# Patient Record
Sex: MALE | Race: WHITE | NOT HISPANIC OR LATINO
[De-identification: names, ages, dates, MRNs, and addresses within clinical notes are randomized per-mention and may not be internally consistent; named-entity substitution may affect disease eponyms.]

---

## 2018-08-26 ENCOUNTER — TRANSCRIPTION ENCOUNTER (OUTPATIENT)
Age: 63
End: 2018-08-26

## 2019-02-15 ENCOUNTER — NON-APPOINTMENT (OUTPATIENT)
Age: 64
End: 2019-02-15

## 2019-02-15 ENCOUNTER — APPOINTMENT (OUTPATIENT)
Dept: INTERNAL MEDICINE | Facility: CLINIC | Age: 64
End: 2019-02-15
Payer: MEDICAID

## 2019-02-15 VITALS
HEIGHT: 68 IN | OXYGEN SATURATION: 98 % | DIASTOLIC BLOOD PRESSURE: 110 MMHG | WEIGHT: 174 LBS | SYSTOLIC BLOOD PRESSURE: 170 MMHG | TEMPERATURE: 97.8 F | HEART RATE: 80 BPM | RESPIRATION RATE: 16 BRPM | BODY MASS INDEX: 26.37 KG/M2

## 2019-02-15 DIAGNOSIS — Z78.9 OTHER SPECIFIED HEALTH STATUS: ICD-10-CM

## 2019-02-15 DIAGNOSIS — Z83.438 FAMILY HISTORY OF OTHER DISORDER OF LIPOPROTEIN METABOLISM AND OTHER LIPIDEMIA: ICD-10-CM

## 2019-02-15 DIAGNOSIS — Z63.4 DISAPPEARANCE AND DEATH OF FAMILY MEMBER: ICD-10-CM

## 2019-02-15 DIAGNOSIS — Z82.49 FAMILY HISTORY OF ISCHEMIC HEART DISEASE AND OTHER DISEASES OF THE CIRCULATORY SYSTEM: ICD-10-CM

## 2019-02-15 DIAGNOSIS — Z85.828 PERSONAL HISTORY OF OTHER MALIGNANT NEOPLASM OF SKIN: ICD-10-CM

## 2019-02-15 DIAGNOSIS — Z86.19 PERSONAL HISTORY OF OTHER INFECTIOUS AND PARASITIC DISEASES: ICD-10-CM

## 2019-02-15 PROCEDURE — 93000 ELECTROCARDIOGRAM COMPLETE: CPT

## 2019-02-15 PROCEDURE — 99204 OFFICE O/P NEW MOD 45 MIN: CPT | Mod: 25

## 2019-02-15 RX ORDER — OMEPRAZOLE MAGNESIUM 20 MG/1
20 TABLET, DELAYED RELEASE ORAL
Refills: 0 | Status: ACTIVE | COMMUNITY

## 2019-02-15 RX ORDER — ALUMINUM HYDROXIDE AND MAGNESIUM CARBONATE 95; 358 MG/15ML; MG/15ML
LIQUID ORAL
Refills: 0 | Status: ACTIVE | COMMUNITY

## 2019-02-15 SDOH — SOCIAL STABILITY - SOCIAL INSECURITY: DISSAPEARANCE AND DEATH OF FAMILY MEMBER: Z63.4

## 2019-02-17 ENCOUNTER — NON-APPOINTMENT (OUTPATIENT)
Age: 64
End: 2019-02-17

## 2019-02-25 LAB
25(OH)D3 SERPL-MCNC: 23.3 NG/ML
ALBUMIN SERPL ELPH-MCNC: 4.8 G/DL
ALP BLD-CCNC: 77 U/L
ALT SERPL-CCNC: 14 U/L
ANION GAP SERPL CALC-SCNC: 11 MMOL/L
APPEARANCE: CLEAR
AST SERPL-CCNC: 18 U/L
BASOPHILS # BLD AUTO: 0.02 K/UL
BASOPHILS NFR BLD AUTO: 0.3 %
BILIRUB SERPL-MCNC: 0.3 MG/DL
BILIRUBIN URINE: NEGATIVE
BLOOD URINE: NEGATIVE
BUN SERPL-MCNC: 14 MG/DL
CALCIUM SERPL-MCNC: 9.8 MG/DL
CHLORIDE SERPL-SCNC: 105 MMOL/L
CHOLEST SERPL-MCNC: 227 MG/DL
CHOLEST/HDLC SERPL: 4.8 RATIO
CO2 SERPL-SCNC: 26 MMOL/L
COLOR: YELLOW
CREAT SERPL-MCNC: 0.98 MG/DL
CREAT SPEC-SCNC: 14 MG/DL
EOSINOPHIL # BLD AUTO: 0.08 K/UL
EOSINOPHIL NFR BLD AUTO: 1.4 %
GLUCOSE QUALITATIVE U: NEGATIVE MG/DL
GLUCOSE SERPL-MCNC: 99 MG/DL
HCT VFR BLD CALC: 46.3 %
HDLC SERPL-MCNC: 47 MG/DL
HGB BLD-MCNC: 14.5 G/DL
IMM GRANULOCYTES NFR BLD AUTO: 0.3 %
KETONES URINE: NEGATIVE
LDLC SERPL CALC-MCNC: 141 MG/DL
LEUKOCYTE ESTERASE URINE: NEGATIVE
LYMPHOCYTES # BLD AUTO: 1.49 K/UL
LYMPHOCYTES NFR BLD AUTO: 26 %
MAN DIFF?: NORMAL
MCHC RBC-ENTMCNC: 28.4 PG
MCHC RBC-ENTMCNC: 31.3 GM/DL
MCV RBC AUTO: 90.6 FL
MICROALBUMIN 24H UR DL<=1MG/L-MCNC: <1.2 MG/DL
MICROALBUMIN/CREAT 24H UR-RTO: NORMAL
MONOCYTES # BLD AUTO: 0.55 K/UL
MONOCYTES NFR BLD AUTO: 9.6 %
NEUTROPHILS # BLD AUTO: 3.56 K/UL
NEUTROPHILS NFR BLD AUTO: 62.4 %
NITRITE URINE: NEGATIVE
PH URINE: 6.5
PLATELET # BLD AUTO: 279 K/UL
POTASSIUM SERPL-SCNC: 4.3 MMOL/L
PROT SERPL-MCNC: 7.8 G/DL
PROTEIN URINE: NEGATIVE MG/DL
PSA SERPL-MCNC: 3.23 NG/ML
RBC # BLD: 5.11 M/UL
RBC # FLD: 14.4 %
SODIUM SERPL-SCNC: 142 MMOL/L
SPECIFIC GRAVITY URINE: 1.01
TRIGL SERPL-MCNC: 194 MG/DL
TSH SERPL-ACNC: 3.61 UIU/ML
UROBILINOGEN URINE: NEGATIVE MG/DL
WBC # FLD AUTO: 5.72 K/UL

## 2019-03-20 ENCOUNTER — APPOINTMENT (OUTPATIENT)
Dept: CARDIOLOGY | Facility: CLINIC | Age: 64
End: 2019-03-20
Payer: MEDICAID

## 2019-03-20 PROCEDURE — 93306 TTE W/DOPPLER COMPLETE: CPT

## 2019-03-21 ENCOUNTER — APPOINTMENT (OUTPATIENT)
Dept: CARDIOLOGY | Facility: CLINIC | Age: 64
End: 2019-03-21
Payer: MEDICAID

## 2019-03-21 ENCOUNTER — APPOINTMENT (OUTPATIENT)
Dept: INTERNAL MEDICINE | Facility: CLINIC | Age: 64
End: 2019-03-21
Payer: MEDICAID

## 2019-03-21 VITALS
TEMPERATURE: 98.1 F | SYSTOLIC BLOOD PRESSURE: 171 MMHG | RESPIRATION RATE: 17 BRPM | WEIGHT: 177 LBS | HEART RATE: 77 BPM | HEIGHT: 68 IN | BODY MASS INDEX: 26.83 KG/M2 | DIASTOLIC BLOOD PRESSURE: 103 MMHG | OXYGEN SATURATION: 99 %

## 2019-03-21 VITALS — SYSTOLIC BLOOD PRESSURE: 150 MMHG | DIASTOLIC BLOOD PRESSURE: 90 MMHG

## 2019-03-21 DIAGNOSIS — Z76.89 PERSONS ENCOUNTERING HEALTH SERVICES IN OTHER SPECIFIED CIRCUMSTANCES: ICD-10-CM

## 2019-03-21 DIAGNOSIS — Z28.21 IMMUNIZATION NOT CARRIED OUT BECAUSE OF PATIENT REFUSAL: ICD-10-CM

## 2019-03-21 DIAGNOSIS — Z12.5 ENCOUNTER FOR SCREENING FOR MALIGNANT NEOPLASM OF PROSTATE: ICD-10-CM

## 2019-03-21 LAB
DATE COLLECTED: NORMAL
HEMOCCULT SP1 STL QL: NEGATIVE

## 2019-03-21 PROCEDURE — 82270 OCCULT BLOOD FECES: CPT

## 2019-03-21 PROCEDURE — 99205 OFFICE O/P NEW HI 60 MIN: CPT

## 2019-03-21 PROCEDURE — 99396 PREV VISIT EST AGE 40-64: CPT

## 2019-03-21 PROCEDURE — 93000 ELECTROCARDIOGRAM COMPLETE: CPT

## 2019-03-21 RX ORDER — CHLORHEXIDINE GLUCONATE 4 %
600 LIQUID (ML) TOPICAL
Refills: 0 | Status: ACTIVE | COMMUNITY

## 2019-03-28 ENCOUNTER — FORM ENCOUNTER (OUTPATIENT)
Age: 64
End: 2019-03-28

## 2019-03-29 ENCOUNTER — APPOINTMENT (OUTPATIENT)
Dept: CT IMAGING | Facility: CLINIC | Age: 64
End: 2019-03-29
Payer: MEDICAID

## 2019-03-29 ENCOUNTER — OUTPATIENT (OUTPATIENT)
Dept: OUTPATIENT SERVICES | Facility: HOSPITAL | Age: 64
LOS: 1 days | End: 2019-03-29
Payer: MEDICAID

## 2019-03-29 DIAGNOSIS — I77.810 THORACIC AORTIC ECTASIA: ICD-10-CM

## 2019-03-29 PROCEDURE — 82565 ASSAY OF CREATININE: CPT

## 2019-03-29 PROCEDURE — 71275 CT ANGIOGRAPHY CHEST: CPT | Mod: 26

## 2019-03-29 PROCEDURE — 71275 CT ANGIOGRAPHY CHEST: CPT

## 2019-06-12 ENCOUNTER — TRANSCRIPTION ENCOUNTER (OUTPATIENT)
Age: 64
End: 2019-06-12

## 2019-08-13 ENCOUNTER — FORM ENCOUNTER (OUTPATIENT)
Age: 64
End: 2019-08-13

## 2019-08-14 ENCOUNTER — OUTPATIENT (OUTPATIENT)
Dept: OUTPATIENT SERVICES | Facility: HOSPITAL | Age: 64
LOS: 1 days | End: 2019-08-14
Payer: MEDICAID

## 2019-08-14 ENCOUNTER — APPOINTMENT (OUTPATIENT)
Dept: MRI IMAGING | Facility: CLINIC | Age: 64
End: 2019-08-14

## 2019-08-14 DIAGNOSIS — Z00.8 ENCOUNTER FOR OTHER GENERAL EXAMINATION: ICD-10-CM

## 2019-08-14 DIAGNOSIS — D86.9 SARCOIDOSIS, UNSPECIFIED: ICD-10-CM

## 2019-08-14 PROCEDURE — 74183 MRI ABD W/O CNTR FLWD CNTR: CPT

## 2019-08-14 PROCEDURE — 74183 MRI ABD W/O CNTR FLWD CNTR: CPT | Mod: 26

## 2019-08-14 PROCEDURE — A9585: CPT

## 2019-11-27 PROBLEM — Z28.21 REFUSED INFLUENZA VACCINE: Status: ACTIVE | Noted: 2019-02-15

## 2020-03-16 ENCOUNTER — NON-APPOINTMENT (OUTPATIENT)
Age: 65
End: 2020-03-16

## 2020-03-16 ENCOUNTER — APPOINTMENT (OUTPATIENT)
Dept: INTERNAL MEDICINE | Facility: CLINIC | Age: 65
End: 2020-03-16
Payer: MEDICARE

## 2020-03-16 VITALS
SYSTOLIC BLOOD PRESSURE: 178 MMHG | OXYGEN SATURATION: 97 % | HEIGHT: 67 IN | TEMPERATURE: 98.3 F | DIASTOLIC BLOOD PRESSURE: 104 MMHG | BODY MASS INDEX: 27.94 KG/M2 | WEIGHT: 178 LBS | RESPIRATION RATE: 14 BRPM | HEART RATE: 83 BPM

## 2020-03-16 VITALS — SYSTOLIC BLOOD PRESSURE: 150 MMHG | DIASTOLIC BLOOD PRESSURE: 90 MMHG

## 2020-03-16 DIAGNOSIS — I10 ESSENTIAL (PRIMARY) HYPERTENSION: ICD-10-CM

## 2020-03-16 DIAGNOSIS — G89.29 PAIN IN RIGHT ANKLE AND JOINTS OF RIGHT FOOT: ICD-10-CM

## 2020-03-16 DIAGNOSIS — M25.571 PAIN IN RIGHT ANKLE AND JOINTS OF RIGHT FOOT: ICD-10-CM

## 2020-03-16 DIAGNOSIS — F43.21 ADJUSTMENT DISORDER WITH DEPRESSED MOOD: ICD-10-CM

## 2020-03-16 PROCEDURE — G0402 INITIAL PREVENTIVE EXAM: CPT

## 2020-03-16 PROCEDURE — 36415 COLL VENOUS BLD VENIPUNCTURE: CPT

## 2020-03-16 PROCEDURE — 82270 OCCULT BLOOD FECES: CPT

## 2020-03-16 PROCEDURE — G0403: CPT

## 2020-03-16 RX ORDER — MELATONIN 3 MG
TABLET ORAL
Refills: 0 | Status: ACTIVE | COMMUNITY

## 2020-03-16 NOTE — PHYSICAL EXAM
[No Acute Distress] : no acute distress [Well Nourished] : well nourished [Well Developed] : well developed [Well-Appearing] : well-appearing [Normal Sclera/Conjunctiva] : normal sclera/conjunctiva [PERRL] : pupils equal round and reactive to light [EOMI] : extraocular movements intact [Normal Outer Ear/Nose] : the outer ears and nose were normal in appearance [Normal Oropharynx] : the oropharynx was normal [Normal TMs] : both tympanic membranes were normal [Normal Nasal Mucosa] : the nasal mucosa was normal [No JVD] : no jugular venous distention [Supple] : supple [No Lymphadenopathy] : no lymphadenopathy [No Respiratory Distress] : no respiratory distress  [Clear to Auscultation] : lungs were clear to auscultation bilaterally [No Accessory Muscle Use] : no accessory muscle use [Normal Rate] : normal rate  [Regular Rhythm] : with a regular rhythm [Normal S1, S2] : normal S1 and S2 [No Murmur] : no murmur heard [No Carotid Bruits] : no carotid bruits [Pedal Pulses Present] : the pedal pulses are present [No Edema] : there was no peripheral edema [Normal Appearance] : normal in appearance [No Masses] : no palpable masses [No Nipple Discharge] : no nipple discharge [No Axillary Lymphadenopathy] : no axillary lymphadenopathy [Soft] : abdomen soft [Non Tender] : non-tender [Non-distended] : non-distended [Normal Bowel Sounds] : normal bowel sounds [No Stool to Guaiac] : no stool to guaiac [Normal Sphincter Tone] : normal sphincter tone [No Mass] : no mass [Stool Occult Blood] : stool negative for occult blood [Normal Axillary Nodes] : no axillary lymphadenopathy [Normal Posterior Cervical Nodes] : no posterior cervical lymphadenopathy [Normal Anterior Cervical Nodes] : no anterior cervical lymphadenopathy [Normal Inguinal Nodes] : no inguinal lymphadenopathy [No CVA Tenderness] : no CVA  tenderness [No Spinal Tenderness] : no spinal tenderness [No Joint Swelling] : no joint swelling [Grossly Normal Strength/Tone] : grossly normal strength/tone [No Rash] : no rash [Normal Gait] : normal gait [Coordination Grossly Intact] : coordination grossly intact [No Focal Deficits] : no focal deficits [Deep Tendon Reflexes (DTR)] : deep tendon reflexes were 2+ and symmetric [Speech Grossly Normal] : speech grossly normal [Normal Affect] : the affect was normal [Normal Mood] : the mood was normal [Normal Insight/Judgement] : insight and judgment were intact [FreeTextEntry1] : hemorrhoid

## 2020-03-16 NOTE — HISTORY OF PRESENT ILLNESS
[de-identified] : This is a 64 year old male with a PMHx of white coat HTN (never been on meds), hyperlipidemia, aortic root dilation, abnormal EKG and GERD here for CPE. \par R ankle and R knee pain is chronic from hx of playing soccer. He stopped playing soccer 10 years ago and states pain has gotten worse since then. Sometimes he limps, but states pain come and goes. He is not interested in surgery, PT, or shots at this time.\par MRI of abd showed a benign pancreatic cyst. He denies any associated pain or symptoms. \par BP is elevated. He has white coat and has never been on BP meds. He checks BP and home and states it is WNL.  He has cut down with caffeine intake, but admits he needs to limit salty foods and drink more water. \par He has hx of Abnormal EKG and aortic root dilation. Due to FU with cardio, Dr. Patton, and get another echo. Not having any chest pain/palps/lightheadedness or SOB. He feels better when he is active and exercises.\par GERD controlled on meds 2x per week and apple cider vinegar daily. Due for colonoscopy and endoscopy with Dr Jordan/Jhoan.\par He does have hx of high cholesterol and was taking atorvastatin 20mg but stopped due to muscleaches. Taking red rice yeast without complaint. \par He is under a lot of stress. His wife  5 years ago and he is raising their 2 sons who are now 13 and 9. He retired and wants to be healthy for them.\par He is eating and sleeping well. The patient denies any current fevers, chills, cold symptoms, fatigue, headaches, dizziness, blurry vision, chest pain, palpitations, shortness of breath, dyspnea on exertion, cough, abdominal pain, urinary symptoms or any nausea/vomiting/diarrhea/constipation. Due to see GI for repeat colonoscopy/endoscopy. Due to see cardio and optho. Due for colonoscopy. Not sure he wants to get Pneumovax or prevnar 13. His HCP is his sister and he is full code. \par

## 2020-03-16 NOTE — COUNSELING
[Fall prevention counseling provided] : Fall prevention counseling provided [None] : None [Good understanding] : Patient has a good understanding of lifestyle changes and the steps needed to achieve self management goals

## 2020-03-16 NOTE — ASSESSMENT
[FreeTextEntry1] : This is a 64 year old male with a PMHx of white coat HTN (never been on meds), hyperlipidemia, aortic root dilation, abnormal EKG and GERD here for CPE. \par #1 R ankle and knee pain: Not interested in injections or surgery at this time. Suggested supplements like glucosamine and chondroitin, tumeric or fish oil. Also recommended rubbing CBD oil over areas of pain. \par #2 abnormal EKG/aortic root dilation: Told pt to FU with Dr. Patton for echo.\par #3 Hyperlipidemia: Off statin. Continue low cholesterol diet, exercise and maintaining a good weight. Checked lipids and LFTs. Taking red rice yeast. \par #4 Stress/grief: The patient has a high stress level. Discussed different techniques to alleviate stress without medication at length. Techniques that were recommended were deep breathing, yoga, aerobic exercise, prayer and meditation. The patient was advised to maintain a healthy, well balanced diet and sleep atleast 7-8 hours per night.\par #5 GERD: GERD diet and precautions. Takes apple cider vinegar daily and omeprazole 2x per week. Due for colonoscopy and endoscopy. Referral given for GI. \par #6 Vitamin D def: Continue vitamin D supplements. Rechecking today.\par #7 White coat HTN without HTN: Refused meds today. Risks of uncontrolled hypertension explained including but not limited to MI/TIA/CVA/PVD/PAD/CHF/retinopathy/nephropathy/vascular problems and ultimately death.Recommendations include: Low sodium diet (2 grams/24 hours), limit caffeine to 1 cup per day, Maintain a healthy weight, exercise atleast 30 minutes 3x per week, Medication compliance and Annual EKG/2D Echo/cardiac evaluation.\par #8 HCM/CPE: He had CPE today including EKG, BW, WENDIE and stool occult. Due for optho- referral given. Refused pneumonia vaccines. Age appropriate health care maintenance modalities were discussed at length including proper nutrition, routine exercise, maintain healthy weight, safe sexual practices, the use of seatbelts, the use of sunblock/proper clothing, the avoidance of binge drinking, the avoidance of drug use, fall precautions, medication compliance and side effects, the need for preventative screenings and the need for routine medical followup .All the patient's questions and concerns were answered at the time of the visit. The patient is agreeable to above treatment plan. Full code and sister is HCP (Bushra). \par \par \par

## 2020-03-16 NOTE — REVIEW OF SYSTEMS
[Heartburn] : heartburn [Joint Pain] : joint pain [Anxiety] : anxiety [Negative] : Heme/Lymph [FreeTextEntry3] : reading glasses [FreeTextEntry5] : abnormal EKG, aortic root dilation, elevated BP [FreeTextEntry9] : R ankle and knee pain [de-identified] : hx skin cancer [de-identified] : stress

## 2020-03-16 NOTE — ADDENDUM
[FreeTextEntry1] : Documented by Jalyn Caballero acting as a scribe for Dr. Kadi Noble. 3/16/2020\par \par All medical record entries made by the Scribe were at my, Dr. Kadi Noble, direction and personally dictated by me on 3/16/2020. I have reviewed the chart and agree that the record accurately reflects my personal performance of the history, physical exam, assessment and plan. I have also personally directed, reviewed, and agreed with the chart.

## 2020-03-16 NOTE — HEALTH RISK ASSESSMENT
[No falls in past year] : Patient reported no falls in the past year [0] : 2) Feeling down, depressed, or hopeless: Not at all (0) [[id number]] : 0 [Patient reported colonoscopy was normal] : Patient reported colonoscopy was normal [HIV Test offered] : HIV Test offered [Hepatitis C test offered] : Hepatitis C test offered [Fully functional (bathing, dressing, toileting, transferring, walking, feeding)] : Fully functional (bathing, dressing, toileting, transferring, walking, feeding) [Fully functional (using the telephone, shopping, preparing meals, housekeeping, doing laundry, using] : Fully functional and needs no help or supervision to perform IADLs (using the telephone, shopping, preparing meals, housekeeping, doing laundry, using transportation, managing medications and managing finances) [Designated Healthcare Proxy] : Designated healthcare proxy [Relationship: ___] : Relationship: [unfilled] [Aggressive treatment] : aggressive treatment [AdvancecareDate] : 3/16/2020

## 2020-03-20 LAB
25(OH)D3 SERPL-MCNC: 52.3 NG/ML
ALBUMIN SERPL ELPH-MCNC: 5 G/DL
ALP BLD-CCNC: 81 U/L
ALT SERPL-CCNC: 10 U/L
ANION GAP SERPL CALC-SCNC: 14 MMOL/L
APPEARANCE: CLEAR
AST SERPL-CCNC: 17 U/L
BASOPHILS # BLD AUTO: 0.03 K/UL
BASOPHILS NFR BLD AUTO: 0.6 %
BILIRUB SERPL-MCNC: 0.5 MG/DL
BILIRUBIN URINE: NEGATIVE
BLOOD URINE: NEGATIVE
BUN SERPL-MCNC: 15 MG/DL
CALCIUM SERPL-MCNC: 10 MG/DL
CHLORIDE SERPL-SCNC: 104 MMOL/L
CHOLEST SERPL-MCNC: 220 MG/DL
CHOLEST/HDLC SERPL: 4.1 RATIO
CO2 SERPL-SCNC: 25 MMOL/L
COLOR: COLORLESS
CREAT SERPL-MCNC: 1.11 MG/DL
CREAT SPEC-SCNC: 37 MG/DL
EOSINOPHIL # BLD AUTO: 0.04 K/UL
EOSINOPHIL NFR BLD AUTO: 0.8 %
GLUCOSE QUALITATIVE U: NEGATIVE
GLUCOSE SERPL-MCNC: 94 MG/DL
HCT VFR BLD CALC: 47.8 %
HDLC SERPL-MCNC: 53 MG/DL
HGB BLD-MCNC: 14.3 G/DL
IMM GRANULOCYTES NFR BLD AUTO: 0.2 %
KETONES URINE: NEGATIVE
LDLC SERPL CALC-MCNC: 144 MG/DL
LEUKOCYTE ESTERASE URINE: NEGATIVE
LYMPHOCYTES # BLD AUTO: 1.24 K/UL
LYMPHOCYTES NFR BLD AUTO: 25.1 %
MAN DIFF?: NORMAL
MCHC RBC-ENTMCNC: 28.3 PG
MCHC RBC-ENTMCNC: 29.9 GM/DL
MCV RBC AUTO: 94.7 FL
MICROALBUMIN 24H UR DL<=1MG/L-MCNC: <1.2 MG/DL
MICROALBUMIN/CREAT 24H UR-RTO: NORMAL MG/G
MONOCYTES # BLD AUTO: 0.36 K/UL
MONOCYTES NFR BLD AUTO: 7.3 %
NEUTROPHILS # BLD AUTO: 3.27 K/UL
NEUTROPHILS NFR BLD AUTO: 66 %
NITRITE URINE: NEGATIVE
PH URINE: 7
PLATELET # BLD AUTO: 227 K/UL
POTASSIUM SERPL-SCNC: 5.6 MMOL/L
PROT SERPL-MCNC: 7.6 G/DL
PROTEIN URINE: NEGATIVE
PSA SERPL-MCNC: 3.88 NG/ML
RBC # BLD: 5.05 M/UL
RBC # FLD: 13.9 %
SODIUM SERPL-SCNC: 143 MMOL/L
SPECIFIC GRAVITY URINE: 1.01
TRIGL SERPL-MCNC: 109 MG/DL
TSH SERPL-ACNC: 2.76 UIU/ML
UROBILINOGEN URINE: NORMAL
WBC # FLD AUTO: 4.95 K/UL

## 2021-01-01 ENCOUNTER — NON-APPOINTMENT (OUTPATIENT)
Age: 66
End: 2021-01-01

## 2021-01-01 ENCOUNTER — APPOINTMENT (OUTPATIENT)
Dept: INTERNAL MEDICINE | Facility: CLINIC | Age: 66
End: 2021-01-01
Payer: MEDICARE

## 2021-01-01 ENCOUNTER — APPOINTMENT (OUTPATIENT)
Dept: DISASTER EMERGENCY | Facility: HOSPITAL | Age: 66
End: 2021-01-01

## 2021-01-01 ENCOUNTER — TRANSCRIPTION ENCOUNTER (OUTPATIENT)
Age: 66
End: 2021-01-01

## 2021-01-01 ENCOUNTER — LABORATORY RESULT (OUTPATIENT)
Age: 66
End: 2021-01-01

## 2021-01-01 ENCOUNTER — INPATIENT (INPATIENT)
Facility: HOSPITAL | Age: 66
LOS: 16 days | DRG: 208 | End: 2021-05-03
Attending: INTERNAL MEDICINE | Admitting: HOSPITALIST
Payer: MEDICARE

## 2021-01-01 VITALS
HEART RATE: 83 BPM | BODY MASS INDEX: 27.78 KG/M2 | WEIGHT: 177 LBS | OXYGEN SATURATION: 98 % | TEMPERATURE: 98 F | SYSTOLIC BLOOD PRESSURE: 163 MMHG | DIASTOLIC BLOOD PRESSURE: 90 MMHG | HEIGHT: 67 IN

## 2021-01-01 VITALS
TEMPERATURE: 101 F | OXYGEN SATURATION: 93 % | HEART RATE: 133 BPM | SYSTOLIC BLOOD PRESSURE: 203 MMHG | DIASTOLIC BLOOD PRESSURE: 90 MMHG | RESPIRATION RATE: 36 BRPM

## 2021-01-01 VITALS — DIASTOLIC BLOOD PRESSURE: 80 MMHG | SYSTOLIC BLOOD PRESSURE: 140 MMHG

## 2021-01-01 DIAGNOSIS — E78.5 HYPERLIPIDEMIA, UNSPECIFIED: ICD-10-CM

## 2021-01-01 DIAGNOSIS — Z00.00 ENCOUNTER FOR GENERAL ADULT MEDICAL EXAMINATION W/OUT ABNORMAL FINDINGS: ICD-10-CM

## 2021-01-01 DIAGNOSIS — K86.9 DISEASE OF PANCREAS, UNSPECIFIED: ICD-10-CM

## 2021-01-01 DIAGNOSIS — Z12.11 ENCOUNTER FOR SCREENING FOR MALIGNANT NEOPLASM OF COLON: ICD-10-CM

## 2021-01-01 DIAGNOSIS — R51.9 HEADACHE, UNSPECIFIED: ICD-10-CM

## 2021-01-01 DIAGNOSIS — Z20.822 CONTACT WITH AND (SUSPECTED) EXPOSURE TO COVID-19: ICD-10-CM

## 2021-01-01 DIAGNOSIS — U07.1 COVID-19: ICD-10-CM

## 2021-01-01 DIAGNOSIS — J96.00 ACUTE RESPIRATORY FAILURE, UNSPECIFIED WHETHER WITH HYPOXIA OR HYPERCAPNIA: ICD-10-CM

## 2021-01-01 DIAGNOSIS — Z12.5 ENCOUNTER FOR SCREENING FOR MALIGNANT NEOPLASM OF PROSTATE: ICD-10-CM

## 2021-01-01 DIAGNOSIS — E55.9 VITAMIN D DEFICIENCY, UNSPECIFIED: ICD-10-CM

## 2021-01-01 DIAGNOSIS — K21.9 GASTRO-ESOPHAGEAL REFLUX DISEASE W/OUT ESOPHAGITIS: ICD-10-CM

## 2021-01-01 DIAGNOSIS — R94.31 ABNORMAL ELECTROCARDIOGRAM [ECG] [EKG]: ICD-10-CM

## 2021-01-01 DIAGNOSIS — Z98.890 OTHER SPECIFIED POSTPROCEDURAL STATES: Chronic | ICD-10-CM

## 2021-01-01 DIAGNOSIS — K86.2 CYST OF PANCREAS: ICD-10-CM

## 2021-01-01 DIAGNOSIS — I46.9 CARDIAC ARREST, CAUSE UNSPECIFIED: ICD-10-CM

## 2021-01-01 DIAGNOSIS — R50.9 FEVER, UNSPECIFIED: ICD-10-CM

## 2021-01-01 DIAGNOSIS — R73.01 IMPAIRED FASTING GLUCOSE: ICD-10-CM

## 2021-01-01 DIAGNOSIS — N17.0 ACUTE KIDNEY FAILURE WITH TUBULAR NECROSIS: ICD-10-CM

## 2021-01-01 DIAGNOSIS — R53.83 OTHER MALAISE: ICD-10-CM

## 2021-01-01 DIAGNOSIS — I77.810 THORACIC AORTIC ECTASIA: ICD-10-CM

## 2021-01-01 DIAGNOSIS — R05 COUGH: ICD-10-CM

## 2021-01-01 DIAGNOSIS — F43.9 REACTION TO SEVERE STRESS, UNSPECIFIED: ICD-10-CM

## 2021-01-01 DIAGNOSIS — Z28.21 IMMUNIZATION NOT CARRIED OUT BECAUSE OF PATIENT REFUSAL: ICD-10-CM

## 2021-01-01 DIAGNOSIS — J93.9 PNEUMOTHORAX, UNSPECIFIED: ICD-10-CM

## 2021-01-01 DIAGNOSIS — R53.81 OTHER MALAISE: ICD-10-CM

## 2021-01-01 DIAGNOSIS — R97.20 ELEVATED PROSTATE, SPECIFIC ANTIGEN [PSA]: ICD-10-CM

## 2021-01-01 DIAGNOSIS — R03.0 ELEVATED BLOOD-PRESSURE READING, W/OUT DIAGNOSIS OF HYPERTENSION: ICD-10-CM

## 2021-01-01 LAB
-  AMPICILLIN/SULBACTAM: SIGNIFICANT CHANGE UP
-  CEFAZOLIN: SIGNIFICANT CHANGE UP
-  CLINDAMYCIN: SIGNIFICANT CHANGE UP
-  ERYTHROMYCIN: SIGNIFICANT CHANGE UP
-  GENTAMICIN: SIGNIFICANT CHANGE UP
-  OXACILLIN: SIGNIFICANT CHANGE UP
-  PENICILLIN: SIGNIFICANT CHANGE UP
-  RIFAMPIN: SIGNIFICANT CHANGE UP
-  TETRACYCLINE: SIGNIFICANT CHANGE UP
-  TRIMETHOPRIM/SULFAMETHOXAZOLE: SIGNIFICANT CHANGE UP
-  VANCOMYCIN: SIGNIFICANT CHANGE UP
25(OH)D3 SERPL-MCNC: 46.1 NG/ML
A1C WITH ESTIMATED AVERAGE GLUCOSE RESULT: 6.1 % — HIGH (ref 4–5.6)
ALBUMIN SERPL ELPH-MCNC: 1.3 G/DL — LOW (ref 3.3–5.2)
ALBUMIN SERPL ELPH-MCNC: 1.9 G/DL — LOW (ref 3.3–5.2)
ALBUMIN SERPL ELPH-MCNC: 2.3 G/DL — LOW (ref 3.3–5.2)
ALBUMIN SERPL ELPH-MCNC: 2.5 G/DL — LOW (ref 3.3–5.2)
ALBUMIN SERPL ELPH-MCNC: 2.7 G/DL — LOW (ref 3.3–5.2)
ALBUMIN SERPL ELPH-MCNC: 2.9 G/DL — LOW (ref 3.3–5.2)
ALBUMIN SERPL ELPH-MCNC: 3 G/DL — LOW (ref 3.3–5.2)
ALBUMIN SERPL ELPH-MCNC: 3.1 G/DL — LOW (ref 3.3–5.2)
ALBUMIN SERPL ELPH-MCNC: 3.4 G/DL — SIGNIFICANT CHANGE UP (ref 3.3–5.2)
ALBUMIN SERPL ELPH-MCNC: 3.7 G/DL — SIGNIFICANT CHANGE UP (ref 3.3–5.2)
ALBUMIN SERPL ELPH-MCNC: 4.4 G/DL
ALP BLD-CCNC: 82 U/L
ALP SERPL-CCNC: 101 U/L — SIGNIFICANT CHANGE UP (ref 40–120)
ALP SERPL-CCNC: 103 U/L — SIGNIFICANT CHANGE UP (ref 40–120)
ALP SERPL-CCNC: 104 U/L — SIGNIFICANT CHANGE UP (ref 40–120)
ALP SERPL-CCNC: 106 U/L — SIGNIFICANT CHANGE UP (ref 40–120)
ALP SERPL-CCNC: 385 U/L — HIGH (ref 40–120)
ALP SERPL-CCNC: 64 U/L — SIGNIFICANT CHANGE UP (ref 40–120)
ALP SERPL-CCNC: 68 U/L — SIGNIFICANT CHANGE UP (ref 40–120)
ALP SERPL-CCNC: 69 U/L — SIGNIFICANT CHANGE UP (ref 40–120)
ALP SERPL-CCNC: 72 U/L — SIGNIFICANT CHANGE UP (ref 40–120)
ALP SERPL-CCNC: 78 U/L — SIGNIFICANT CHANGE UP (ref 40–120)
ALP SERPL-CCNC: 80 U/L — SIGNIFICANT CHANGE UP (ref 40–120)
ALP SERPL-CCNC: 82 U/L — SIGNIFICANT CHANGE UP (ref 40–120)
ALP SERPL-CCNC: 84 U/L — SIGNIFICANT CHANGE UP (ref 40–120)
ALP SERPL-CCNC: 85 U/L — SIGNIFICANT CHANGE UP (ref 40–120)
ALP SERPL-CCNC: 86 U/L — SIGNIFICANT CHANGE UP (ref 40–120)
ALP SERPL-CCNC: 91 U/L — SIGNIFICANT CHANGE UP (ref 40–120)
ALP SERPL-CCNC: 98 U/L — SIGNIFICANT CHANGE UP (ref 40–120)
ALT FLD-CCNC: 108 U/L — HIGH
ALT FLD-CCNC: 125 U/L — HIGH
ALT FLD-CCNC: 1254 U/L — HIGH
ALT FLD-CCNC: 1556 U/L — HIGH
ALT FLD-CCNC: 1624 U/L — HIGH
ALT FLD-CCNC: 31 U/L — SIGNIFICANT CHANGE UP
ALT FLD-CCNC: 41 U/L — HIGH
ALT FLD-CCNC: 43 U/L — HIGH
ALT FLD-CCNC: 47 U/L — HIGH
ALT FLD-CCNC: 53 U/L — HIGH
ALT FLD-CCNC: 55 U/L — HIGH
ALT FLD-CCNC: 55 U/L — HIGH
ALT FLD-CCNC: 63 U/L — HIGH
ALT FLD-CCNC: 69 U/L — HIGH
ALT FLD-CCNC: 90 U/L — HIGH
ALT FLD-CCNC: 92 U/L — HIGH
ALT FLD-CCNC: >6450 U/L — HIGH
ALT SERPL-CCNC: 13 U/L
ANION GAP SERPL CALC-SCNC: 11 MMOL/L — SIGNIFICANT CHANGE UP (ref 5–17)
ANION GAP SERPL CALC-SCNC: 12 MMOL/L — SIGNIFICANT CHANGE UP (ref 5–17)
ANION GAP SERPL CALC-SCNC: 13 MMOL/L — SIGNIFICANT CHANGE UP (ref 5–17)
ANION GAP SERPL CALC-SCNC: 14 MMOL/L — SIGNIFICANT CHANGE UP (ref 5–17)
ANION GAP SERPL CALC-SCNC: 14 MMOL/L — SIGNIFICANT CHANGE UP (ref 5–17)
ANION GAP SERPL CALC-SCNC: 15 MMOL/L — SIGNIFICANT CHANGE UP (ref 5–17)
ANION GAP SERPL CALC-SCNC: 15 MMOL/L — SIGNIFICANT CHANGE UP (ref 5–17)
ANION GAP SERPL CALC-SCNC: 16 MMOL/L — SIGNIFICANT CHANGE UP (ref 5–17)
ANION GAP SERPL CALC-SCNC: 17 MMOL/L — SIGNIFICANT CHANGE UP (ref 5–17)
ANION GAP SERPL CALC-SCNC: 17 MMOL/L — SIGNIFICANT CHANGE UP (ref 5–17)
ANION GAP SERPL CALC-SCNC: 19 MMOL/L — HIGH (ref 5–17)
ANION GAP SERPL CALC-SCNC: 20 MMOL/L — HIGH (ref 5–17)
ANION GAP SERPL CALC-SCNC: 21 MMOL/L — HIGH (ref 5–17)
ANION GAP SERPL CALC-SCNC: 22 MMOL/L — HIGH (ref 5–17)
ANION GAP SERPL CALC-SCNC: 9 MMOL/L
ANISOCYTOSIS BLD QL: SLIGHT — SIGNIFICANT CHANGE UP
APPEARANCE UR: CLEAR — SIGNIFICANT CHANGE UP
APPEARANCE UR: CLEAR — SIGNIFICANT CHANGE UP
APPEARANCE: CLEAR
APTT BLD: 26.9 SEC — LOW (ref 27.5–35.5)
APTT BLD: 31.5 SEC — SIGNIFICANT CHANGE UP (ref 27.5–35.5)
AST SERPL-CCNC: 102 U/L — HIGH
AST SERPL-CCNC: 1360 U/L — HIGH
AST SERPL-CCNC: 15 U/L
AST SERPL-CCNC: 1700 U/L — HIGH
AST SERPL-CCNC: 21 U/L — SIGNIFICANT CHANGE UP
AST SERPL-CCNC: 21 U/L — SIGNIFICANT CHANGE UP
AST SERPL-CCNC: 28 U/L — SIGNIFICANT CHANGE UP
AST SERPL-CCNC: 37 U/L — SIGNIFICANT CHANGE UP
AST SERPL-CCNC: 40 U/L — HIGH
AST SERPL-CCNC: 405 U/L — HIGH
AST SERPL-CCNC: 50 U/L — HIGH
AST SERPL-CCNC: 52 U/L — HIGH
AST SERPL-CCNC: 53 U/L — HIGH
AST SERPL-CCNC: 64 U/L — HIGH
AST SERPL-CCNC: 68 U/L — HIGH
AST SERPL-CCNC: 80 U/L — HIGH
AST SERPL-CCNC: 97 U/L — HIGH
AST SERPL-CCNC: >7000 U/L — HIGH
BACTERIA # UR AUTO: ABNORMAL
BACTERIA # UR AUTO: ABNORMAL
BASE EXCESS BLDA CALC-SCNC: -8.3 MMOL/L — LOW (ref -2–2)
BASE EXCESS BLDA CALC-SCNC: 0.4 MMOL/L — SIGNIFICANT CHANGE UP (ref -2–2)
BASOPHILS # BLD AUTO: 0 K/UL — SIGNIFICANT CHANGE UP (ref 0–0.2)
BASOPHILS # BLD AUTO: 0 K/UL — SIGNIFICANT CHANGE UP (ref 0–0.2)
BASOPHILS # BLD AUTO: 0.02 K/UL
BASOPHILS # BLD AUTO: 0.02 K/UL — SIGNIFICANT CHANGE UP (ref 0–0.2)
BASOPHILS # BLD AUTO: 0.04 K/UL
BASOPHILS # BLD AUTO: 0.09 K/UL — SIGNIFICANT CHANGE UP (ref 0–0.2)
BASOPHILS NFR BLD AUTO: 0 % — SIGNIFICANT CHANGE UP (ref 0–2)
BASOPHILS NFR BLD AUTO: 0 % — SIGNIFICANT CHANGE UP (ref 0–2)
BASOPHILS NFR BLD AUTO: 0.2 %
BASOPHILS NFR BLD AUTO: 0.2 % — SIGNIFICANT CHANGE UP (ref 0–2)
BASOPHILS NFR BLD AUTO: 0.3 % — SIGNIFICANT CHANGE UP (ref 0–2)
BASOPHILS NFR BLD AUTO: 0.8 %
BILIRUB SERPL-MCNC: 0.4 MG/DL
BILIRUB SERPL-MCNC: 0.5 MG/DL — SIGNIFICANT CHANGE UP (ref 0.4–2)
BILIRUB SERPL-MCNC: 0.6 MG/DL — SIGNIFICANT CHANGE UP (ref 0.4–2)
BILIRUB SERPL-MCNC: 0.7 MG/DL — SIGNIFICANT CHANGE UP (ref 0.4–2)
BILIRUB SERPL-MCNC: 0.8 MG/DL — SIGNIFICANT CHANGE UP (ref 0.4–2)
BILIRUB SERPL-MCNC: 1.3 MG/DL — SIGNIFICANT CHANGE UP (ref 0.4–2)
BILIRUB SERPL-MCNC: 3.2 MG/DL — HIGH (ref 0.4–2)
BILIRUB SERPL-MCNC: 3.6 MG/DL — HIGH (ref 0.4–2)
BILIRUB SERPL-MCNC: 3.7 MG/DL — HIGH (ref 0.4–2)
BILIRUB SERPL-MCNC: 6.2 MG/DL — HIGH (ref 0.4–2)
BILIRUB UR-MCNC: ABNORMAL
BILIRUB UR-MCNC: NEGATIVE — SIGNIFICANT CHANGE UP
BILIRUBIN URINE: NEGATIVE
BLOOD GAS COMMENTS ARTERIAL: SIGNIFICANT CHANGE UP
BLOOD URINE: NEGATIVE
BUN SERPL-MCNC: 16 MG/DL
BUN SERPL-MCNC: 20 MG/DL — SIGNIFICANT CHANGE UP (ref 8–20)
BUN SERPL-MCNC: 25 MG/DL — HIGH (ref 8–20)
BUN SERPL-MCNC: 25 MG/DL — HIGH (ref 8–20)
BUN SERPL-MCNC: 27 MG/DL — HIGH (ref 8–20)
BUN SERPL-MCNC: 29 MG/DL — HIGH (ref 8–20)
BUN SERPL-MCNC: 31 MG/DL — HIGH (ref 8–20)
BUN SERPL-MCNC: 31 MG/DL — HIGH (ref 8–20)
BUN SERPL-MCNC: 32 MG/DL — HIGH (ref 8–20)
BUN SERPL-MCNC: 34 MG/DL — HIGH (ref 8–20)
BUN SERPL-MCNC: 35 MG/DL — HIGH (ref 8–20)
BUN SERPL-MCNC: 37 MG/DL — HIGH (ref 8–20)
BUN SERPL-MCNC: 38 MG/DL — HIGH (ref 8–20)
BUN SERPL-MCNC: 46 MG/DL — HIGH (ref 8–20)
BUN SERPL-MCNC: 49 MG/DL — HIGH (ref 8–20)
BUN SERPL-MCNC: 50 MG/DL — HIGH (ref 8–20)
BUN SERPL-MCNC: 51 MG/DL — HIGH (ref 8–20)
BUN SERPL-MCNC: 54 MG/DL — HIGH (ref 8–20)
BUN SERPL-MCNC: 55 MG/DL — HIGH (ref 8–20)
BUN SERPL-MCNC: 56 MG/DL — HIGH (ref 8–20)
BURR CELLS BLD QL SMEAR: PRESENT — SIGNIFICANT CHANGE UP
CALCIUM SERPL-MCNC: 6.9 MG/DL — LOW (ref 8.6–10.2)
CALCIUM SERPL-MCNC: 7 MG/DL — LOW (ref 8.6–10.2)
CALCIUM SERPL-MCNC: 7.3 MG/DL — LOW (ref 8.6–10.2)
CALCIUM SERPL-MCNC: 7.4 MG/DL — LOW (ref 8.6–10.2)
CALCIUM SERPL-MCNC: 7.6 MG/DL — LOW (ref 8.6–10.2)
CALCIUM SERPL-MCNC: 7.6 MG/DL — LOW (ref 8.6–10.2)
CALCIUM SERPL-MCNC: 7.8 MG/DL — LOW (ref 8.6–10.2)
CALCIUM SERPL-MCNC: 8 MG/DL — LOW (ref 8.6–10.2)
CALCIUM SERPL-MCNC: 8.4 MG/DL — LOW (ref 8.6–10.2)
CALCIUM SERPL-MCNC: 8.5 MG/DL — LOW (ref 8.6–10.2)
CALCIUM SERPL-MCNC: 8.6 MG/DL — SIGNIFICANT CHANGE UP (ref 8.6–10.2)
CALCIUM SERPL-MCNC: 8.7 MG/DL — SIGNIFICANT CHANGE UP (ref 8.6–10.2)
CALCIUM SERPL-MCNC: 8.8 MG/DL — SIGNIFICANT CHANGE UP (ref 8.6–10.2)
CALCIUM SERPL-MCNC: 8.8 MG/DL — SIGNIFICANT CHANGE UP (ref 8.6–10.2)
CALCIUM SERPL-MCNC: 9 MG/DL — SIGNIFICANT CHANGE UP (ref 8.6–10.2)
CALCIUM SERPL-MCNC: 9 MG/DL — SIGNIFICANT CHANGE UP (ref 8.6–10.2)
CALCIUM SERPL-MCNC: 9.3 MG/DL — SIGNIFICANT CHANGE UP (ref 8.6–10.2)
CALCIUM SERPL-MCNC: 9.6 MG/DL
CHLORIDE SERPL-SCNC: 100 MMOL/L — SIGNIFICANT CHANGE UP (ref 98–107)
CHLORIDE SERPL-SCNC: 101 MMOL/L — SIGNIFICANT CHANGE UP (ref 98–107)
CHLORIDE SERPL-SCNC: 101 MMOL/L — SIGNIFICANT CHANGE UP (ref 98–107)
CHLORIDE SERPL-SCNC: 105 MMOL/L
CHLORIDE SERPL-SCNC: 93 MMOL/L — LOW (ref 98–107)
CHLORIDE SERPL-SCNC: 94 MMOL/L — LOW (ref 98–107)
CHLORIDE SERPL-SCNC: 95 MMOL/L — LOW (ref 98–107)
CHLORIDE SERPL-SCNC: 96 MMOL/L — LOW (ref 98–107)
CHLORIDE SERPL-SCNC: 96 MMOL/L — LOW (ref 98–107)
CHLORIDE SERPL-SCNC: 97 MMOL/L — LOW (ref 98–107)
CHLORIDE SERPL-SCNC: 97 MMOL/L — LOW (ref 98–107)
CHLORIDE SERPL-SCNC: 98 MMOL/L — SIGNIFICANT CHANGE UP (ref 98–107)
CHLORIDE SERPL-SCNC: 99 MMOL/L — SIGNIFICANT CHANGE UP (ref 98–107)
CHOLEST SERPL-MCNC: 195 MG/DL
CO2 SERPL-SCNC: 15 MMOL/L — LOW (ref 22–29)
CO2 SERPL-SCNC: 19 MMOL/L — LOW (ref 22–29)
CO2 SERPL-SCNC: 22 MMOL/L — SIGNIFICANT CHANGE UP (ref 22–29)
CO2 SERPL-SCNC: 22 MMOL/L — SIGNIFICANT CHANGE UP (ref 22–29)
CO2 SERPL-SCNC: 24 MMOL/L — SIGNIFICANT CHANGE UP (ref 22–29)
CO2 SERPL-SCNC: 25 MMOL/L — SIGNIFICANT CHANGE UP (ref 22–29)
CO2 SERPL-SCNC: 26 MMOL/L
CO2 SERPL-SCNC: 26 MMOL/L — SIGNIFICANT CHANGE UP (ref 22–29)
CO2 SERPL-SCNC: 27 MMOL/L — SIGNIFICANT CHANGE UP (ref 22–29)
COLOR SPEC: ABNORMAL
COLOR SPEC: YELLOW — SIGNIFICANT CHANGE UP
COLOR: NORMAL
COMMENT - URINE: SIGNIFICANT CHANGE UP
COVID-19 SPIKE DOMAIN AB INTERP: POSITIVE
COVID-19 SPIKE DOMAIN ANTIBODY RESULT: 28.1 U/ML — HIGH
CREAT ?TM UR-MCNC: 46 MG/DL — SIGNIFICANT CHANGE UP
CREAT SERPL-MCNC: 0.52 MG/DL — SIGNIFICANT CHANGE UP (ref 0.5–1.3)
CREAT SERPL-MCNC: 0.52 MG/DL — SIGNIFICANT CHANGE UP (ref 0.5–1.3)
CREAT SERPL-MCNC: 0.53 MG/DL — SIGNIFICANT CHANGE UP (ref 0.5–1.3)
CREAT SERPL-MCNC: 0.54 MG/DL — SIGNIFICANT CHANGE UP (ref 0.5–1.3)
CREAT SERPL-MCNC: 0.55 MG/DL — SIGNIFICANT CHANGE UP (ref 0.5–1.3)
CREAT SERPL-MCNC: 0.63 MG/DL — SIGNIFICANT CHANGE UP (ref 0.5–1.3)
CREAT SERPL-MCNC: 0.76 MG/DL — SIGNIFICANT CHANGE UP (ref 0.5–1.3)
CREAT SERPL-MCNC: 0.77 MG/DL — SIGNIFICANT CHANGE UP (ref 0.5–1.3)
CREAT SERPL-MCNC: 0.78 MG/DL — SIGNIFICANT CHANGE UP (ref 0.5–1.3)
CREAT SERPL-MCNC: 0.8 MG/DL — SIGNIFICANT CHANGE UP (ref 0.5–1.3)
CREAT SERPL-MCNC: 0.85 MG/DL — SIGNIFICANT CHANGE UP (ref 0.5–1.3)
CREAT SERPL-MCNC: 0.86 MG/DL — SIGNIFICANT CHANGE UP (ref 0.5–1.3)
CREAT SERPL-MCNC: 0.92 MG/DL — SIGNIFICANT CHANGE UP (ref 0.5–1.3)
CREAT SERPL-MCNC: 1.02 MG/DL — SIGNIFICANT CHANGE UP (ref 0.5–1.3)
CREAT SERPL-MCNC: 1.04 MG/DL
CREAT SERPL-MCNC: 1.18 MG/DL — SIGNIFICANT CHANGE UP (ref 0.5–1.3)
CREAT SERPL-MCNC: 1.55 MG/DL — HIGH (ref 0.5–1.3)
CREAT SERPL-MCNC: 1.95 MG/DL — HIGH (ref 0.5–1.3)
CREAT SERPL-MCNC: 2.3 MG/DL — HIGH (ref 0.5–1.3)
CREAT SERPL-MCNC: 2.47 MG/DL — HIGH (ref 0.5–1.3)
CREAT SERPL-MCNC: 2.7 MG/DL — HIGH (ref 0.5–1.3)
CREAT SERPL-MCNC: 2.81 MG/DL — HIGH (ref 0.5–1.3)
CREAT SPEC-SCNC: 65 MG/DL
CRP SERPL-MCNC: 123 MG/L — HIGH
CRP SERPL-MCNC: 128 MG/L — HIGH
CRP SERPL-MCNC: 134 MG/L — HIGH
CRP SERPL-MCNC: 15 MG/L — HIGH
CRP SERPL-MCNC: 154 MG/L — HIGH
CRP SERPL-MCNC: 16 MG/L — HIGH
CRP SERPL-MCNC: 174 MG/L — HIGH
CRP SERPL-MCNC: 211 MG/L — HIGH
CRP SERPL-MCNC: 27 MG/L — HIGH
CRP SERPL-MCNC: 294 MG/L — HIGH
CRP SERPL-MCNC: 368 MG/L — HIGH
CRP SERPL-MCNC: 40 MG/L — HIGH
CRP SERPL-MCNC: 66 MG/L — HIGH
CRP SERPL-MCNC: >422 MG/L — HIGH
CRP SERPL-MCNC: >422 MG/L — HIGH
CULTURE RESULTS: SIGNIFICANT CHANGE UP
D DIMER BLD IA.RAPID-MCNC: 1063 NG/ML DDU — HIGH
D DIMER BLD IA.RAPID-MCNC: 1103 NG/ML DDU — HIGH
D DIMER BLD IA.RAPID-MCNC: 2444 NG/ML DDU — HIGH
D DIMER BLD IA.RAPID-MCNC: 2612 NG/ML DDU — HIGH
D DIMER BLD IA.RAPID-MCNC: 3269 NG/ML DDU — HIGH
D DIMER BLD IA.RAPID-MCNC: 394 NG/ML DDU — HIGH
D DIMER BLD IA.RAPID-MCNC: 497 NG/ML DDU — HIGH
D DIMER BLD IA.RAPID-MCNC: HIGH NG/ML DDU
DATE COLLECTED: NORMAL
DEPRECATED D DIMER PPP IA-ACNC: 344 NG/ML DDU
DIFF PNL FLD: ABNORMAL
DIFF PNL FLD: ABNORMAL
ELLIPTOCYTES BLD QL SMEAR: SLIGHT — SIGNIFICANT CHANGE UP
EOSINOPHIL # BLD AUTO: 0 K/UL
EOSINOPHIL # BLD AUTO: 0 K/UL — SIGNIFICANT CHANGE UP (ref 0–0.5)
EOSINOPHIL # BLD AUTO: 0.07 K/UL
EOSINOPHIL NFR BLD AUTO: 0 %
EOSINOPHIL NFR BLD AUTO: 0 % — SIGNIFICANT CHANGE UP (ref 0–6)
EOSINOPHIL NFR BLD AUTO: 1.5 %
EPI CELLS # UR: SIGNIFICANT CHANGE UP
ESTIMATED AVERAGE GLUCOSE: 128 MG/DL — HIGH (ref 68–114)
FERRITIN SERPL-MCNC: 1075 NG/ML — HIGH (ref 30–400)
FERRITIN SERPL-MCNC: 1177 NG/ML — HIGH (ref 30–400)
FERRITIN SERPL-MCNC: 1203 NG/ML — HIGH (ref 30–400)
FERRITIN SERPL-MCNC: 1254 NG/ML — HIGH (ref 30–400)
FERRITIN SERPL-MCNC: 1254 NG/ML — HIGH (ref 30–400)
FERRITIN SERPL-MCNC: 1279 NG/ML — HIGH (ref 30–400)
FERRITIN SERPL-MCNC: 1326 NG/ML — HIGH (ref 30–400)
FERRITIN SERPL-MCNC: 1328 NG/ML — HIGH (ref 30–400)
FERRITIN SERPL-MCNC: 1506 NG/ML — HIGH (ref 30–400)
FERRITIN SERPL-MCNC: 2343 NG/ML — HIGH (ref 30–400)
FERRITIN SERPL-MCNC: 4325 NG/ML — HIGH (ref 30–400)
FERRITIN SERPL-MCNC: 816 NG/ML
FERRITIN SERPL-MCNC: 829 NG/ML — HIGH (ref 30–400)
FERRITIN SERPL-MCNC: HIGH NG/ML (ref 30–400)
FIBRINOGEN PPP-MCNC: 493 MG/DL — SIGNIFICANT CHANGE UP (ref 290–520)
FIBRINOGEN PPP-MCNC: 547 MG/DL — HIGH (ref 290–520)
FIBRINOGEN PPP-MCNC: 579 MG/DL — HIGH (ref 290–520)
FIBRINOGEN PPP-MCNC: 926 MG/DL — CRITICAL HIGH (ref 290–520)
FLU A RESULT: SIGNIFICANT CHANGE UP
FLU A RESULT: SIGNIFICANT CHANGE UP
FLUAV AG NPH QL: SIGNIFICANT CHANGE UP
FLUBV AG NPH QL: SIGNIFICANT CHANGE UP
GAS PNL BLDA: SIGNIFICANT CHANGE UP
GIANT PLATELETS BLD QL SMEAR: PRESENT — SIGNIFICANT CHANGE UP
GLUCOSE BLDC GLUCOMTR-MCNC: 118 MG/DL — HIGH (ref 70–99)
GLUCOSE BLDC GLUCOMTR-MCNC: 149 MG/DL — HIGH (ref 70–99)
GLUCOSE BLDC GLUCOMTR-MCNC: 150 MG/DL — HIGH (ref 70–99)
GLUCOSE BLDC GLUCOMTR-MCNC: 170 MG/DL — HIGH (ref 70–99)
GLUCOSE BLDC GLUCOMTR-MCNC: 177 MG/DL — HIGH (ref 70–99)
GLUCOSE BLDC GLUCOMTR-MCNC: 178 MG/DL — HIGH (ref 70–99)
GLUCOSE BLDC GLUCOMTR-MCNC: 187 MG/DL — HIGH (ref 70–99)
GLUCOSE BLDC GLUCOMTR-MCNC: 199 MG/DL — HIGH (ref 70–99)
GLUCOSE BLDC GLUCOMTR-MCNC: 203 MG/DL — HIGH (ref 70–99)
GLUCOSE BLDC GLUCOMTR-MCNC: 255 MG/DL — HIGH (ref 70–99)
GLUCOSE BLDC GLUCOMTR-MCNC: 31 MG/DL — CRITICAL LOW (ref 70–99)
GLUCOSE BLDC GLUCOMTR-MCNC: 41 MG/DL — CRITICAL LOW (ref 70–99)
GLUCOSE BLDC GLUCOMTR-MCNC: 43 MG/DL — CRITICAL LOW (ref 70–99)
GLUCOSE BLDC GLUCOMTR-MCNC: 47 MG/DL — CRITICAL LOW (ref 70–99)
GLUCOSE BLDC GLUCOMTR-MCNC: 50 MG/DL — CRITICAL LOW (ref 70–99)
GLUCOSE BLDC GLUCOMTR-MCNC: 53 MG/DL — CRITICAL LOW (ref 70–99)
GLUCOSE BLDC GLUCOMTR-MCNC: 64 MG/DL — LOW (ref 70–99)
GLUCOSE BLDC GLUCOMTR-MCNC: 87 MG/DL — SIGNIFICANT CHANGE UP (ref 70–99)
GLUCOSE BLDC GLUCOMTR-MCNC: 97 MG/DL — SIGNIFICANT CHANGE UP (ref 70–99)
GLUCOSE BLDC GLUCOMTR-MCNC: <30 MG/DL — CRITICAL LOW (ref 70–99)
GLUCOSE QUALITATIVE U: NEGATIVE
GLUCOSE SERPL-MCNC: 116 MG/DL — HIGH (ref 70–99)
GLUCOSE SERPL-MCNC: 116 MG/DL — HIGH (ref 70–99)
GLUCOSE SERPL-MCNC: 118 MG/DL
GLUCOSE SERPL-MCNC: 127 MG/DL — HIGH (ref 70–99)
GLUCOSE SERPL-MCNC: 130 MG/DL — HIGH (ref 70–99)
GLUCOSE SERPL-MCNC: 131 MG/DL — HIGH (ref 70–99)
GLUCOSE SERPL-MCNC: 134 MG/DL — HIGH (ref 70–99)
GLUCOSE SERPL-MCNC: 134 MG/DL — HIGH (ref 70–99)
GLUCOSE SERPL-MCNC: 136 MG/DL — HIGH (ref 70–99)
GLUCOSE SERPL-MCNC: 147 MG/DL — HIGH (ref 70–99)
GLUCOSE SERPL-MCNC: 149 MG/DL — HIGH (ref 70–99)
GLUCOSE SERPL-MCNC: 149 MG/DL — HIGH (ref 70–99)
GLUCOSE SERPL-MCNC: 155 MG/DL — HIGH (ref 70–99)
GLUCOSE SERPL-MCNC: 200 MG/DL — HIGH (ref 70–99)
GLUCOSE SERPL-MCNC: 207 MG/DL — HIGH (ref 70–99)
GLUCOSE SERPL-MCNC: 220 MG/DL — HIGH (ref 70–99)
GLUCOSE SERPL-MCNC: 225 MG/DL — HIGH (ref 70–99)
GLUCOSE SERPL-MCNC: 250 MG/DL — HIGH (ref 70–99)
GLUCOSE SERPL-MCNC: 261 MG/DL — HIGH (ref 70–99)
GLUCOSE SERPL-MCNC: 277 MG/DL — HIGH (ref 70–99)
GLUCOSE SERPL-MCNC: 92 MG/DL — SIGNIFICANT CHANGE UP (ref 70–99)
GLUCOSE SERPL-MCNC: 98 MG/DL — SIGNIFICANT CHANGE UP (ref 70–99)
GLUCOSE UR QL: 250
GLUCOSE UR QL: NEGATIVE MG/DL — SIGNIFICANT CHANGE UP
GRAM STN FLD: SIGNIFICANT CHANGE UP
GRAN CASTS # UR COMP ASSIST: ABNORMAL /LPF
GRAN CASTS # UR COMP ASSIST: ABNORMAL /LPF
HCO3 BLDA-SCNC: 17 MMOL/L — LOW (ref 21–29)
HCO3 BLDA-SCNC: 24 MMOL/L — SIGNIFICANT CHANGE UP (ref 21–29)
HCO3 BLDA-SCNC: 24 MMOL/L — SIGNIFICANT CHANGE UP (ref 21–29)
HCT VFR BLD CALC: 39.8 % — SIGNIFICANT CHANGE UP (ref 39–50)
HCT VFR BLD CALC: 40.6 % — SIGNIFICANT CHANGE UP (ref 39–50)
HCT VFR BLD CALC: 40.6 % — SIGNIFICANT CHANGE UP (ref 39–50)
HCT VFR BLD CALC: 40.8 % — SIGNIFICANT CHANGE UP (ref 39–50)
HCT VFR BLD CALC: 41.3 % — SIGNIFICANT CHANGE UP (ref 39–50)
HCT VFR BLD CALC: 42 % — SIGNIFICANT CHANGE UP (ref 39–50)
HCT VFR BLD CALC: 42.2 % — SIGNIFICANT CHANGE UP (ref 39–50)
HCT VFR BLD CALC: 42.4 % — SIGNIFICANT CHANGE UP (ref 39–50)
HCT VFR BLD CALC: 43.7 % — SIGNIFICANT CHANGE UP (ref 39–50)
HCT VFR BLD CALC: 43.9 % — SIGNIFICANT CHANGE UP (ref 39–50)
HCT VFR BLD CALC: 44 %
HCT VFR BLD CALC: 44.9 % — SIGNIFICANT CHANGE UP (ref 39–50)
HCT VFR BLD CALC: 45.3 %
HCT VFR BLD CALC: 45.5 % — SIGNIFICANT CHANGE UP (ref 39–50)
HCT VFR BLD CALC: 45.8 % — SIGNIFICANT CHANGE UP (ref 39–50)
HCT VFR BLD CALC: 45.8 % — SIGNIFICANT CHANGE UP (ref 39–50)
HCT VFR BLD CALC: 46.4 % — SIGNIFICANT CHANGE UP (ref 39–50)
HCT VFR BLD CALC: 47.7 % — SIGNIFICANT CHANGE UP (ref 39–50)
HCV AB S/CO SERPL IA: 0.11 S/CO — SIGNIFICANT CHANGE UP (ref 0–0.99)
HCV AB SERPL-IMP: SIGNIFICANT CHANGE UP
HDLC SERPL-MCNC: 46 MG/DL
HEMOCCULT SP1 STL QL: NEGATIVE
HGB BLD-MCNC: 12.8 G/DL — LOW (ref 13–17)
HGB BLD-MCNC: 13 G/DL — SIGNIFICANT CHANGE UP (ref 13–17)
HGB BLD-MCNC: 13.2 G/DL — SIGNIFICANT CHANGE UP (ref 13–17)
HGB BLD-MCNC: 13.3 G/DL — SIGNIFICANT CHANGE UP (ref 13–17)
HGB BLD-MCNC: 13.3 G/DL — SIGNIFICANT CHANGE UP (ref 13–17)
HGB BLD-MCNC: 13.5 G/DL — SIGNIFICANT CHANGE UP (ref 13–17)
HGB BLD-MCNC: 13.5 G/DL — SIGNIFICANT CHANGE UP (ref 13–17)
HGB BLD-MCNC: 13.9 G/DL — SIGNIFICANT CHANGE UP (ref 13–17)
HGB BLD-MCNC: 13.9 G/DL — SIGNIFICANT CHANGE UP (ref 13–17)
HGB BLD-MCNC: 14 G/DL — SIGNIFICANT CHANGE UP (ref 13–17)
HGB BLD-MCNC: 14.2 G/DL — SIGNIFICANT CHANGE UP (ref 13–17)
HGB BLD-MCNC: 14.4 G/DL — SIGNIFICANT CHANGE UP (ref 13–17)
HGB BLD-MCNC: 14.6 G/DL
HGB BLD-MCNC: 14.6 G/DL
HGB BLD-MCNC: 14.9 G/DL — SIGNIFICANT CHANGE UP (ref 13–17)
HGB BLD-MCNC: 14.9 G/DL — SIGNIFICANT CHANGE UP (ref 13–17)
HOROWITZ INDEX BLDA+IHG-RTO: SIGNIFICANT CHANGE UP
HYALINE CASTS # UR AUTO: ABNORMAL /LPF
IMM GRANULOCYTES NFR BLD AUTO: 0.2 %
IMM GRANULOCYTES NFR BLD AUTO: 0.5 %
IMM GRANULOCYTES NFR BLD AUTO: 0.5 % — SIGNIFICANT CHANGE UP (ref 0–1.5)
IMM GRANULOCYTES NFR BLD AUTO: 1.4 % — SIGNIFICANT CHANGE UP (ref 0–1.5)
INR BLD: 1.38 RATIO — HIGH (ref 0.88–1.16)
INR BLD: 1.42 RATIO — HIGH (ref 0.88–1.16)
KETONES UR-MCNC: ABNORMAL
KETONES UR-MCNC: NEGATIVE — SIGNIFICANT CHANGE UP
KETONES URINE: NEGATIVE
LACTATE SERPL-SCNC: 7.4 MMOL/L — CRITICAL HIGH (ref 0.5–2)
LACTATE SERPL-SCNC: 7.8 MMOL/L — CRITICAL HIGH (ref 0.5–2)
LDH SERPL L TO P-CCNC: 1096 U/L — HIGH (ref 98–192)
LDH SERPL L TO P-CCNC: 2161 U/L — HIGH (ref 98–192)
LDH SERPL L TO P-CCNC: 494 U/L — HIGH (ref 98–192)
LDH SERPL L TO P-CCNC: 502 U/L — HIGH (ref 98–192)
LDH SERPL L TO P-CCNC: 510 U/L — HIGH (ref 98–192)
LDH SERPL L TO P-CCNC: 555 U/L — HIGH (ref 98–192)
LDH SERPL L TO P-CCNC: 584 U/L — HIGH (ref 98–192)
LDH SERPL L TO P-CCNC: 667 U/L — HIGH (ref 98–192)
LDH SERPL L TO P-CCNC: 717 U/L — HIGH (ref 98–192)
LDH SERPL L TO P-CCNC: 760 U/L — HIGH (ref 98–192)
LDH SERPL L TO P-CCNC: 777 U/L — HIGH (ref 98–192)
LDH SERPL L TO P-CCNC: 819 U/L — HIGH (ref 98–192)
LDH SERPL L TO P-CCNC: 870 U/L — HIGH (ref 98–192)
LDH SERPL L TO P-CCNC: >2332 U/L — HIGH (ref 98–192)
LDLC SERPL CALC-MCNC: 130 MG/DL
LEUKOCYTE ESTERASE UR-ACNC: ABNORMAL
LEUKOCYTE ESTERASE UR-ACNC: NEGATIVE — SIGNIFICANT CHANGE UP
LEUKOCYTE ESTERASE URINE: NEGATIVE
LYMPHOCYTES # BLD AUTO: 0.34 K/UL — LOW (ref 1–3.3)
LYMPHOCYTES # BLD AUTO: 0.49 K/UL
LYMPHOCYTES # BLD AUTO: 0.54 K/UL — LOW (ref 1–3.3)
LYMPHOCYTES # BLD AUTO: 0.64 K/UL — LOW (ref 1–3.3)
LYMPHOCYTES # BLD AUTO: 1.32 K/UL
LYMPHOCYTES # BLD AUTO: 1.59 K/UL — SIGNIFICANT CHANGE UP (ref 1–3.3)
LYMPHOCYTES # BLD AUTO: 1.7 % — LOW (ref 13–44)
LYMPHOCYTES # BLD AUTO: 4.4 % — LOW (ref 13–44)
LYMPHOCYTES # BLD AUTO: 4.8 % — LOW (ref 13–44)
LYMPHOCYTES # BLD AUTO: 5.2 % — LOW (ref 13–44)
LYMPHOCYTES NFR BLD AUTO: 28 %
LYMPHOCYTES NFR BLD AUTO: 4.3 %
MACROCYTES BLD QL: SLIGHT — SIGNIFICANT CHANGE UP
MAGNESIUM SERPL-MCNC: 1.9 MG/DL — SIGNIFICANT CHANGE UP (ref 1.6–2.6)
MAGNESIUM SERPL-MCNC: 1.9 MG/DL — SIGNIFICANT CHANGE UP (ref 1.6–2.6)
MAGNESIUM SERPL-MCNC: 2.1 MG/DL — SIGNIFICANT CHANGE UP (ref 1.6–2.6)
MAGNESIUM SERPL-MCNC: 2.1 MG/DL — SIGNIFICANT CHANGE UP (ref 1.8–2.6)
MAGNESIUM SERPL-MCNC: 2.2 MG/DL — SIGNIFICANT CHANGE UP (ref 1.6–2.6)
MAGNESIUM SERPL-MCNC: 2.3 MG/DL — SIGNIFICANT CHANGE UP (ref 1.6–2.6)
MAGNESIUM SERPL-MCNC: 2.3 MG/DL — SIGNIFICANT CHANGE UP (ref 1.6–2.6)
MAGNESIUM SERPL-MCNC: 2.4 MG/DL — SIGNIFICANT CHANGE UP (ref 1.6–2.6)
MAGNESIUM SERPL-MCNC: 2.5 MG/DL — SIGNIFICANT CHANGE UP (ref 1.6–2.6)
MAGNESIUM SERPL-MCNC: 2.7 MG/DL — HIGH (ref 1.8–2.6)
MAGNESIUM SERPL-MCNC: 2.7 MG/DL — HIGH (ref 1.8–2.6)
MAN DIFF?: NORMAL
MAN DIFF?: NORMAL
MANUAL SMEAR VERIFICATION: SIGNIFICANT CHANGE UP
MCHC RBC-ENTMCNC: 28.2 PG — SIGNIFICANT CHANGE UP (ref 27–34)
MCHC RBC-ENTMCNC: 28.4 PG — SIGNIFICANT CHANGE UP (ref 27–34)
MCHC RBC-ENTMCNC: 28.5 PG — SIGNIFICANT CHANGE UP (ref 27–34)
MCHC RBC-ENTMCNC: 28.5 PG — SIGNIFICANT CHANGE UP (ref 27–34)
MCHC RBC-ENTMCNC: 28.6 PG
MCHC RBC-ENTMCNC: 28.6 PG — SIGNIFICANT CHANGE UP (ref 27–34)
MCHC RBC-ENTMCNC: 28.7 GM/DL — LOW (ref 32–36)
MCHC RBC-ENTMCNC: 28.7 PG — SIGNIFICANT CHANGE UP (ref 27–34)
MCHC RBC-ENTMCNC: 29 PG — SIGNIFICANT CHANGE UP (ref 27–34)
MCHC RBC-ENTMCNC: 29.1 PG — SIGNIFICANT CHANGE UP (ref 27–34)
MCHC RBC-ENTMCNC: 29.2 PG
MCHC RBC-ENTMCNC: 30.3 GM/DL — LOW (ref 32–36)
MCHC RBC-ENTMCNC: 30.8 GM/DL — LOW (ref 32–36)
MCHC RBC-ENTMCNC: 31.2 GM/DL — LOW (ref 32–36)
MCHC RBC-ENTMCNC: 32 GM/DL — SIGNIFICANT CHANGE UP (ref 32–36)
MCHC RBC-ENTMCNC: 32.1 GM/DL — SIGNIFICANT CHANGE UP (ref 32–36)
MCHC RBC-ENTMCNC: 32.2 GM/DL
MCHC RBC-ENTMCNC: 32.2 GM/DL — SIGNIFICANT CHANGE UP (ref 32–36)
MCHC RBC-ENTMCNC: 32.2 GM/DL — SIGNIFICANT CHANGE UP (ref 32–36)
MCHC RBC-ENTMCNC: 32.3 GM/DL — SIGNIFICANT CHANGE UP (ref 32–36)
MCHC RBC-ENTMCNC: 32.4 GM/DL — SIGNIFICANT CHANGE UP (ref 32–36)
MCHC RBC-ENTMCNC: 32.5 GM/DL — SIGNIFICANT CHANGE UP (ref 32–36)
MCHC RBC-ENTMCNC: 32.8 GM/DL — SIGNIFICANT CHANGE UP (ref 32–36)
MCHC RBC-ENTMCNC: 33.2 GM/DL
MCHC RBC-ENTMCNC: 33.3 GM/DL — SIGNIFICANT CHANGE UP (ref 32–36)
MCHC RBC-ENTMCNC: 33.3 GM/DL — SIGNIFICANT CHANGE UP (ref 32–36)
MCV RBC AUTO: 100.2 FL — HIGH (ref 80–100)
MCV RBC AUTO: 86.1 FL
MCV RBC AUTO: 86.2 FL — SIGNIFICANT CHANGE UP (ref 80–100)
MCV RBC AUTO: 86.4 FL — SIGNIFICANT CHANGE UP (ref 80–100)
MCV RBC AUTO: 87.1 FL — SIGNIFICANT CHANGE UP (ref 80–100)
MCV RBC AUTO: 87.7 FL — SIGNIFICANT CHANGE UP (ref 80–100)
MCV RBC AUTO: 87.8 FL — SIGNIFICANT CHANGE UP (ref 80–100)
MCV RBC AUTO: 88.1 FL — SIGNIFICANT CHANGE UP (ref 80–100)
MCV RBC AUTO: 88.5 FL — SIGNIFICANT CHANGE UP (ref 80–100)
MCV RBC AUTO: 88.7 FL — SIGNIFICANT CHANGE UP (ref 80–100)
MCV RBC AUTO: 88.8 FL — SIGNIFICANT CHANGE UP (ref 80–100)
MCV RBC AUTO: 89.1 FL — SIGNIFICANT CHANGE UP (ref 80–100)
MCV RBC AUTO: 89.2 FL — SIGNIFICANT CHANGE UP (ref 80–100)
MCV RBC AUTO: 89.3 FL — SIGNIFICANT CHANGE UP (ref 80–100)
MCV RBC AUTO: 90.6 FL
MCV RBC AUTO: 93.2 FL — SIGNIFICANT CHANGE UP (ref 80–100)
MCV RBC AUTO: 93.9 FL — SIGNIFICANT CHANGE UP (ref 80–100)
MCV RBC AUTO: 94.4 FL — SIGNIFICANT CHANGE UP (ref 80–100)
METAMYELOCYTES # FLD: 3.5 % — HIGH (ref 0–0)
METHOD TYPE: SIGNIFICANT CHANGE UP
METHOD TYPE: SIGNIFICANT CHANGE UP
MICROALBUMIN 24H UR DL<=1MG/L-MCNC: <1.2 MG/DL
MICROALBUMIN/CREAT 24H UR-RTO: NORMAL MG/G
MONOCYTES # BLD AUTO: 0.25 K/UL
MONOCYTES # BLD AUTO: 0.36 K/UL — SIGNIFICANT CHANGE UP (ref 0–0.9)
MONOCYTES # BLD AUTO: 0.44 K/UL
MONOCYTES # BLD AUTO: 0.83 K/UL — SIGNIFICANT CHANGE UP (ref 0–0.9)
MONOCYTES # BLD AUTO: 0.89 K/UL — SIGNIFICANT CHANGE UP (ref 0–0.9)
MONOCYTES # BLD AUTO: 1.21 K/UL — HIGH (ref 0–0.9)
MONOCYTES NFR BLD AUTO: 2.2 %
MONOCYTES NFR BLD AUTO: 2.7 % — SIGNIFICANT CHANGE UP (ref 2–14)
MONOCYTES NFR BLD AUTO: 3.2 % — SIGNIFICANT CHANGE UP (ref 2–14)
MONOCYTES NFR BLD AUTO: 6.1 % — SIGNIFICANT CHANGE UP (ref 2–14)
MONOCYTES NFR BLD AUTO: 6.1 % — SIGNIFICANT CHANGE UP (ref 2–14)
MONOCYTES NFR BLD AUTO: 9.3 %
MSSA DNA SPEC QL NAA+PROBE: SIGNIFICANT CHANGE UP
NEUTROPHILS # BLD AUTO: 10.35 K/UL — HIGH (ref 1.8–7.4)
NEUTROPHILS # BLD AUTO: 10.66 K/UL
NEUTROPHILS # BLD AUTO: 13.01 K/UL — HIGH (ref 1.8–7.4)
NEUTROPHILS # BLD AUTO: 17.62 K/UL — HIGH (ref 1.8–7.4)
NEUTROPHILS # BLD AUTO: 2.83 K/UL
NEUTROPHILS # BLD AUTO: 27.69 K/UL — HIGH (ref 1.8–7.4)
NEUTROPHILS NFR BLD AUTO: 60.2 %
NEUTROPHILS NFR BLD AUTO: 68.7 % — SIGNIFICANT CHANGE UP (ref 43–77)
NEUTROPHILS NFR BLD AUTO: 87.7 % — HIGH (ref 43–77)
NEUTROPHILS NFR BLD AUTO: 90.4 % — HIGH (ref 43–77)
NEUTROPHILS NFR BLD AUTO: 91.3 % — HIGH (ref 43–77)
NEUTROPHILS NFR BLD AUTO: 92.8 %
NEUTS BAND # BLD: 20 % — HIGH (ref 0–8)
NITRITE UR-MCNC: NEGATIVE — SIGNIFICANT CHANGE UP
NITRITE UR-MCNC: NEGATIVE — SIGNIFICANT CHANGE UP
NITRITE URINE: NEGATIVE
NONHDLC SERPL-MCNC: 148 MG/DL
NRBC # BLD: 1 /100 WBCS — HIGH (ref 0–0)
NT-PROBNP SERPL-SCNC: 2137 PG/ML — HIGH (ref 0–300)
ORGANISM # SPEC MICROSCOPIC CNT: SIGNIFICANT CHANGE UP
OSMOLALITY UR: 323 MOSM/KG — SIGNIFICANT CHANGE UP (ref 300–1000)
OVALOCYTES BLD QL SMEAR: SLIGHT — SIGNIFICANT CHANGE UP
PCO2 BLDA: 52 MMHG — HIGH (ref 35–45)
PCO2 BLDA: 55 MMHG — HIGH (ref 35–45)
PCO2 BLDA: 65 MMHG — HIGH (ref 35–45)
PH BLDA: 7.18 — CRITICAL LOW (ref 7.35–7.45)
PH BLDA: 7.26 — LOW (ref 7.35–7.45)
PH BLDA: 7.33 — LOW (ref 7.35–7.45)
PH UR: 5 — SIGNIFICANT CHANGE UP (ref 5–8)
PH UR: 6 — SIGNIFICANT CHANGE UP (ref 5–8)
PH URINE: 7
PHOSPHATE SERPL-MCNC: 2.7 MG/DL — SIGNIFICANT CHANGE UP (ref 2.4–4.7)
PHOSPHATE SERPL-MCNC: 2.9 MG/DL — SIGNIFICANT CHANGE UP (ref 2.4–4.7)
PHOSPHATE SERPL-MCNC: 3.2 MG/DL — SIGNIFICANT CHANGE UP (ref 2.4–4.7)
PHOSPHATE SERPL-MCNC: 3.3 MG/DL — SIGNIFICANT CHANGE UP (ref 2.4–4.7)
PHOSPHATE SERPL-MCNC: 3.5 MG/DL — SIGNIFICANT CHANGE UP (ref 2.4–4.7)
PHOSPHATE SERPL-MCNC: 3.6 MG/DL — SIGNIFICANT CHANGE UP (ref 2.4–4.7)
PHOSPHATE SERPL-MCNC: 3.7 MG/DL — SIGNIFICANT CHANGE UP (ref 2.4–4.7)
PHOSPHATE SERPL-MCNC: 3.7 MG/DL — SIGNIFICANT CHANGE UP (ref 2.4–4.7)
PHOSPHATE SERPL-MCNC: 3.9 MG/DL — SIGNIFICANT CHANGE UP (ref 2.4–4.7)
PHOSPHATE SERPL-MCNC: 3.9 MG/DL — SIGNIFICANT CHANGE UP (ref 2.4–4.7)
PHOSPHATE SERPL-MCNC: 4.2 MG/DL — SIGNIFICANT CHANGE UP (ref 2.4–4.7)
PHOSPHATE SERPL-MCNC: 5.3 MG/DL — HIGH (ref 2.4–4.7)
PHOSPHATE SERPL-MCNC: 5.6 MG/DL — HIGH (ref 2.4–4.7)
PHOSPHATE SERPL-MCNC: 6.3 MG/DL — HIGH (ref 2.4–4.7)
PHOSPHATE SERPL-MCNC: 6.8 MG/DL — HIGH (ref 2.4–4.7)
PHOSPHATE SERPL-MCNC: 8.4 MG/DL — HIGH (ref 2.4–4.7)
PLAT MORPH BLD: NORMAL — SIGNIFICANT CHANGE UP
PLATELET # BLD AUTO: 195 K/UL
PLATELET # BLD AUTO: 205 K/UL — SIGNIFICANT CHANGE UP (ref 150–400)
PLATELET # BLD AUTO: 228 K/UL — SIGNIFICANT CHANGE UP (ref 150–400)
PLATELET # BLD AUTO: 243 K/UL — SIGNIFICANT CHANGE UP (ref 150–400)
PLATELET # BLD AUTO: 244 K/UL — SIGNIFICANT CHANGE UP (ref 150–400)
PLATELET # BLD AUTO: 269 K/UL — SIGNIFICANT CHANGE UP (ref 150–400)
PLATELET # BLD AUTO: 277 K/UL
PLATELET # BLD AUTO: 282 K/UL — SIGNIFICANT CHANGE UP (ref 150–400)
PLATELET # BLD AUTO: 284 K/UL — SIGNIFICANT CHANGE UP (ref 150–400)
PLATELET # BLD AUTO: 291 K/UL — SIGNIFICANT CHANGE UP (ref 150–400)
PLATELET # BLD AUTO: 303 K/UL — SIGNIFICANT CHANGE UP (ref 150–400)
PLATELET # BLD AUTO: 308 K/UL — SIGNIFICANT CHANGE UP (ref 150–400)
PLATELET # BLD AUTO: 313 K/UL — SIGNIFICANT CHANGE UP (ref 150–400)
PLATELET # BLD AUTO: 349 K/UL — SIGNIFICANT CHANGE UP (ref 150–400)
PLATELET # BLD AUTO: 369 K/UL — SIGNIFICANT CHANGE UP (ref 150–400)
PLATELET # BLD AUTO: 402 K/UL — HIGH (ref 150–400)
PLATELET # BLD AUTO: 89 K/UL — LOW (ref 150–400)
PLATELET # BLD AUTO: 93 K/UL — LOW (ref 150–400)
PO2 BLDA: 43 MMHG — CRITICAL LOW (ref 83–108)
PO2 BLDA: 46 MMHG — CRITICAL LOW (ref 83–108)
PO2 BLDA: 49 MMHG — CRITICAL LOW (ref 83–108)
POIKILOCYTOSIS BLD QL AUTO: SIGNIFICANT CHANGE UP
POLYCHROMASIA BLD QL SMEAR: SIGNIFICANT CHANGE UP
POTASSIUM SERPL-MCNC: 4.3 MMOL/L — SIGNIFICANT CHANGE UP (ref 3.5–5.3)
POTASSIUM SERPL-MCNC: 4.3 MMOL/L — SIGNIFICANT CHANGE UP (ref 3.5–5.3)
POTASSIUM SERPL-MCNC: 4.4 MMOL/L — SIGNIFICANT CHANGE UP (ref 3.5–5.3)
POTASSIUM SERPL-MCNC: 4.4 MMOL/L — SIGNIFICANT CHANGE UP (ref 3.5–5.3)
POTASSIUM SERPL-MCNC: 4.5 MMOL/L — SIGNIFICANT CHANGE UP (ref 3.5–5.3)
POTASSIUM SERPL-MCNC: 4.6 MMOL/L — SIGNIFICANT CHANGE UP (ref 3.5–5.3)
POTASSIUM SERPL-MCNC: 4.7 MMOL/L — SIGNIFICANT CHANGE UP (ref 3.5–5.3)
POTASSIUM SERPL-MCNC: 4.8 MMOL/L — SIGNIFICANT CHANGE UP (ref 3.5–5.3)
POTASSIUM SERPL-MCNC: 4.9 MMOL/L — SIGNIFICANT CHANGE UP (ref 3.5–5.3)
POTASSIUM SERPL-MCNC: 5 MMOL/L — SIGNIFICANT CHANGE UP (ref 3.5–5.3)
POTASSIUM SERPL-MCNC: 5.1 MMOL/L — SIGNIFICANT CHANGE UP (ref 3.5–5.3)
POTASSIUM SERPL-MCNC: 5.3 MMOL/L — SIGNIFICANT CHANGE UP (ref 3.5–5.3)
POTASSIUM SERPL-MCNC: 6.6 MMOL/L — CRITICAL HIGH (ref 3.5–5.3)
POTASSIUM SERPL-SCNC: 4.3 MMOL/L — SIGNIFICANT CHANGE UP (ref 3.5–5.3)
POTASSIUM SERPL-SCNC: 4.3 MMOL/L — SIGNIFICANT CHANGE UP (ref 3.5–5.3)
POTASSIUM SERPL-SCNC: 4.4 MMOL/L — SIGNIFICANT CHANGE UP (ref 3.5–5.3)
POTASSIUM SERPL-SCNC: 4.4 MMOL/L — SIGNIFICANT CHANGE UP (ref 3.5–5.3)
POTASSIUM SERPL-SCNC: 4.5 MMOL/L — SIGNIFICANT CHANGE UP (ref 3.5–5.3)
POTASSIUM SERPL-SCNC: 4.6 MMOL/L — SIGNIFICANT CHANGE UP (ref 3.5–5.3)
POTASSIUM SERPL-SCNC: 4.7 MMOL/L — SIGNIFICANT CHANGE UP (ref 3.5–5.3)
POTASSIUM SERPL-SCNC: 4.8 MMOL/L
POTASSIUM SERPL-SCNC: 4.8 MMOL/L — SIGNIFICANT CHANGE UP (ref 3.5–5.3)
POTASSIUM SERPL-SCNC: 4.9 MMOL/L — SIGNIFICANT CHANGE UP (ref 3.5–5.3)
POTASSIUM SERPL-SCNC: 5 MMOL/L — SIGNIFICANT CHANGE UP (ref 3.5–5.3)
POTASSIUM SERPL-SCNC: 5.1 MMOL/L — SIGNIFICANT CHANGE UP (ref 3.5–5.3)
POTASSIUM SERPL-SCNC: 5.3 MMOL/L — SIGNIFICANT CHANGE UP (ref 3.5–5.3)
POTASSIUM SERPL-SCNC: 6.6 MMOL/L — CRITICAL HIGH (ref 3.5–5.3)
PROCALCITONIN SERPL-MCNC: 0.12 NG/ML — HIGH (ref 0.02–0.1)
PROCALCITONIN SERPL-MCNC: 0.67 NG/ML — HIGH (ref 0.02–0.1)
PROCALCITONIN SERPL-MCNC: 2.35 NG/ML
PROCALCITONIN SERPL-MCNC: 5.93 NG/ML — HIGH (ref 0.02–0.1)
PROT ?TM UR-MCNC: 177 MG/DL — HIGH (ref 0–12)
PROT SERPL-MCNC: 3.9 G/DL — LOW (ref 6.6–8.7)
PROT SERPL-MCNC: 4.2 G/DL — LOW (ref 6.6–8.7)
PROT SERPL-MCNC: 4.5 G/DL — LOW (ref 6.6–8.7)
PROT SERPL-MCNC: 4.6 G/DL — LOW (ref 6.6–8.7)
PROT SERPL-MCNC: 4.7 G/DL — LOW (ref 6.6–8.7)
PROT SERPL-MCNC: 5.6 G/DL — LOW (ref 6.6–8.7)
PROT SERPL-MCNC: 5.8 G/DL — LOW (ref 6.6–8.7)
PROT SERPL-MCNC: 5.9 G/DL — LOW (ref 6.6–8.7)
PROT SERPL-MCNC: 6 G/DL — LOW (ref 6.6–8.7)
PROT SERPL-MCNC: 6.2 G/DL — LOW (ref 6.6–8.7)
PROT SERPL-MCNC: 6.5 G/DL — LOW (ref 6.6–8.7)
PROT SERPL-MCNC: 6.5 G/DL — LOW (ref 6.6–8.7)
PROT SERPL-MCNC: 6.6 G/DL — SIGNIFICANT CHANGE UP (ref 6.6–8.7)
PROT SERPL-MCNC: 6.6 G/DL — SIGNIFICANT CHANGE UP (ref 6.6–8.7)
PROT SERPL-MCNC: 6.8 G/DL — SIGNIFICANT CHANGE UP (ref 6.6–8.7)
PROT SERPL-MCNC: 7 G/DL
PROT SERPL-MCNC: 7.4 G/DL — SIGNIFICANT CHANGE UP (ref 6.6–8.7)
PROT SERPL-MCNC: 8 G/DL — SIGNIFICANT CHANGE UP (ref 6.6–8.7)
PROT UR-MCNC: 100
PROT UR-MCNC: 100 MG/DL
PROT/CREAT UR-RTO: 3.8 RATIO — HIGH
PROTEIN URINE: NEGATIVE
PROTHROM AB SERPL-ACNC: 15.8 SEC — HIGH (ref 10.6–13.6)
PROTHROM AB SERPL-ACNC: 16.2 SEC — HIGH (ref 10.6–13.6)
PSA SERPL-MCNC: 5.36 NG/ML
RAPID RVP RESULT: SIGNIFICANT CHANGE UP
RBC # BLD: 4.48 M/UL — SIGNIFICANT CHANGE UP (ref 4.2–5.8)
RBC # BLD: 4.57 M/UL — SIGNIFICANT CHANGE UP (ref 4.2–5.8)
RBC # BLD: 4.61 M/UL — SIGNIFICANT CHANGE UP (ref 4.2–5.8)
RBC # BLD: 4.63 M/UL — SIGNIFICANT CHANGE UP (ref 4.2–5.8)
RBC # BLD: 4.7 M/UL — SIGNIFICANT CHANGE UP (ref 4.2–5.8)
RBC # BLD: 4.71 M/UL — SIGNIFICANT CHANGE UP (ref 4.2–5.8)
RBC # BLD: 4.76 M/UL — SIGNIFICANT CHANGE UP (ref 4.2–5.8)
RBC # BLD: 4.82 M/UL — SIGNIFICANT CHANGE UP (ref 4.2–5.8)
RBC # BLD: 4.87 M/UL — SIGNIFICANT CHANGE UP (ref 4.2–5.8)
RBC # BLD: 4.87 M/UL — SIGNIFICANT CHANGE UP (ref 4.2–5.8)
RBC # BLD: 4.88 M/UL — SIGNIFICANT CHANGE UP (ref 4.2–5.8)
RBC # BLD: 4.9 M/UL — SIGNIFICANT CHANGE UP (ref 4.2–5.8)
RBC # BLD: 5 M/UL
RBC # BLD: 5 M/UL — SIGNIFICANT CHANGE UP (ref 4.2–5.8)
RBC # BLD: 5.03 M/UL — SIGNIFICANT CHANGE UP (ref 4.2–5.8)
RBC # BLD: 5.11 M/UL
RBC # BLD: 5.12 M/UL — SIGNIFICANT CHANGE UP (ref 4.2–5.8)
RBC # BLD: 5.2 M/UL — SIGNIFICANT CHANGE UP (ref 4.2–5.8)
RBC # FLD: 13.2 %
RBC # FLD: 13.2 %
RBC # FLD: 13.4 % — SIGNIFICANT CHANGE UP (ref 10.3–14.5)
RBC # FLD: 13.4 % — SIGNIFICANT CHANGE UP (ref 10.3–14.5)
RBC # FLD: 13.5 % — SIGNIFICANT CHANGE UP (ref 10.3–14.5)
RBC # FLD: 13.6 % — SIGNIFICANT CHANGE UP (ref 10.3–14.5)
RBC # FLD: 13.7 % — SIGNIFICANT CHANGE UP (ref 10.3–14.5)
RBC # FLD: 13.9 % — SIGNIFICANT CHANGE UP (ref 10.3–14.5)
RBC # FLD: 14.4 % — SIGNIFICANT CHANGE UP (ref 10.3–14.5)
RBC # FLD: 14.6 % — HIGH (ref 10.3–14.5)
RBC # FLD: 14.6 % — HIGH (ref 10.3–14.5)
RBC # FLD: 15.5 % — HIGH (ref 10.3–14.5)
RBC BLD AUTO: ABNORMAL
RBC CASTS # UR COMP ASSIST: ABNORMAL /HPF (ref 0–4)
RBC CASTS # UR COMP ASSIST: SIGNIFICANT CHANGE UP /HPF (ref 0–4)
RSV RESULT: SIGNIFICANT CHANGE UP
RSV RNA RESP QL NAA+PROBE: SIGNIFICANT CHANGE UP
SAO2 % BLDA: 71 % — LOW (ref 92–96)
SAO2 % BLDA: 71 % — LOW (ref 92–96)
SAO2 % BLDA: 78 % — LOW (ref 92–96)
SARS-COV-2 IGG+IGM SERPL QL IA: 28.1 U/ML — HIGH
SARS-COV-2 IGG+IGM SERPL QL IA: POSITIVE
SARS-COV-2 N GENE NPH QL NAA+PROBE: DETECTED
SARS-COV-2 RNA SPEC QL NAA+PROBE: DETECTED
SARS-COV-2 RNA SPEC QL NAA+PROBE: DETECTED
SODIUM SERPL-SCNC: 133 MMOL/L — LOW (ref 135–145)
SODIUM SERPL-SCNC: 133 MMOL/L — LOW (ref 135–145)
SODIUM SERPL-SCNC: 135 MMOL/L — SIGNIFICANT CHANGE UP (ref 135–145)
SODIUM SERPL-SCNC: 136 MMOL/L — SIGNIFICANT CHANGE UP (ref 135–145)
SODIUM SERPL-SCNC: 137 MMOL/L — SIGNIFICANT CHANGE UP (ref 135–145)
SODIUM SERPL-SCNC: 138 MMOL/L — SIGNIFICANT CHANGE UP (ref 135–145)
SODIUM SERPL-SCNC: 139 MMOL/L — SIGNIFICANT CHANGE UP (ref 135–145)
SODIUM SERPL-SCNC: 139 MMOL/L — SIGNIFICANT CHANGE UP (ref 135–145)
SODIUM SERPL-SCNC: 140 MMOL/L — SIGNIFICANT CHANGE UP (ref 135–145)
SODIUM SERPL-SCNC: 141 MMOL/L
SODIUM SERPL-SCNC: 141 MMOL/L — SIGNIFICANT CHANGE UP (ref 135–145)
SODIUM SERPL-SCNC: 141 MMOL/L — SIGNIFICANT CHANGE UP (ref 135–145)
SODIUM UR-SCNC: 82 MMOL/L — SIGNIFICANT CHANGE UP
SP GR SPEC: 1.01 — SIGNIFICANT CHANGE UP (ref 1.01–1.02)
SP GR SPEC: 1.02 — SIGNIFICANT CHANGE UP (ref 1.01–1.02)
SPECIFIC GRAVITY URINE: 1.01
SPECIMEN SOURCE: SIGNIFICANT CHANGE UP
TRIGL SERPL-MCNC: 325 MG/DL — HIGH
TRIGL SERPL-MCNC: 94 MG/DL
TROPONIN T SERPL-MCNC: 0.02 NG/ML — SIGNIFICANT CHANGE UP (ref 0–0.06)
TROPONIN T SERPL-MCNC: 0.07 NG/ML — HIGH (ref 0–0.06)
TROPONIN T SERPL-MCNC: <0.01 NG/ML — SIGNIFICANT CHANGE UP (ref 0–0.06)
TSH SERPL-ACNC: 1.3 UIU/ML
UROBILINOGEN FLD QL: 4
UROBILINOGEN FLD QL: NEGATIVE MG/DL — SIGNIFICANT CHANGE UP
UROBILINOGEN URINE: NORMAL
VANCOMYCIN TROUGH SERPL-MCNC: 12.7 UG/ML — SIGNIFICANT CHANGE UP (ref 10–20)
VANCOMYCIN TROUGH SERPL-MCNC: 7 UG/ML — LOW (ref 10–20)
WBC # BLD: 11.33 K/UL — HIGH (ref 3.8–10.5)
WBC # BLD: 14.54 K/UL — HIGH (ref 3.8–10.5)
WBC # BLD: 17.05 K/UL — HIGH (ref 3.8–10.5)
WBC # BLD: 19.87 K/UL — HIGH (ref 3.8–10.5)
WBC # BLD: 22.11 K/UL — HIGH (ref 3.8–10.5)
WBC # BLD: 30.63 K/UL — HIGH (ref 3.8–10.5)
WBC # BLD: 4.02 K/UL — SIGNIFICANT CHANGE UP (ref 3.8–10.5)
WBC # BLD: 5.52 K/UL — SIGNIFICANT CHANGE UP (ref 3.8–10.5)
WBC # BLD: 6.25 K/UL — SIGNIFICANT CHANGE UP (ref 3.8–10.5)
WBC # BLD: 6.41 K/UL — SIGNIFICANT CHANGE UP (ref 3.8–10.5)
WBC # BLD: 6.54 K/UL — SIGNIFICANT CHANGE UP (ref 3.8–10.5)
WBC # BLD: 6.57 K/UL — SIGNIFICANT CHANGE UP (ref 3.8–10.5)
WBC # BLD: 6.58 K/UL — SIGNIFICANT CHANGE UP (ref 3.8–10.5)
WBC # BLD: 7.49 K/UL — SIGNIFICANT CHANGE UP (ref 3.8–10.5)
WBC # BLD: 7.73 K/UL — SIGNIFICANT CHANGE UP (ref 3.8–10.5)
WBC # BLD: 8.99 K/UL — SIGNIFICANT CHANGE UP (ref 3.8–10.5)
WBC # FLD AUTO: 11.33 K/UL — HIGH (ref 3.8–10.5)
WBC # FLD AUTO: 11.48 K/UL
WBC # FLD AUTO: 14.54 K/UL — HIGH (ref 3.8–10.5)
WBC # FLD AUTO: 17.05 K/UL — HIGH (ref 3.8–10.5)
WBC # FLD AUTO: 19.87 K/UL — HIGH (ref 3.8–10.5)
WBC # FLD AUTO: 22.11 K/UL — HIGH (ref 3.8–10.5)
WBC # FLD AUTO: 30.63 K/UL — HIGH (ref 3.8–10.5)
WBC # FLD AUTO: 4.02 K/UL — SIGNIFICANT CHANGE UP (ref 3.8–10.5)
WBC # FLD AUTO: 4.71 K/UL
WBC # FLD AUTO: 5.52 K/UL — SIGNIFICANT CHANGE UP (ref 3.8–10.5)
WBC # FLD AUTO: 6.25 K/UL — SIGNIFICANT CHANGE UP (ref 3.8–10.5)
WBC # FLD AUTO: 6.41 K/UL — SIGNIFICANT CHANGE UP (ref 3.8–10.5)
WBC # FLD AUTO: 6.54 K/UL — SIGNIFICANT CHANGE UP (ref 3.8–10.5)
WBC # FLD AUTO: 6.57 K/UL — SIGNIFICANT CHANGE UP (ref 3.8–10.5)
WBC # FLD AUTO: 6.58 K/UL — SIGNIFICANT CHANGE UP (ref 3.8–10.5)
WBC # FLD AUTO: 7.49 K/UL — SIGNIFICANT CHANGE UP (ref 3.8–10.5)
WBC # FLD AUTO: 7.73 K/UL — SIGNIFICANT CHANGE UP (ref 3.8–10.5)
WBC # FLD AUTO: 8.99 K/UL — SIGNIFICANT CHANGE UP (ref 3.8–10.5)
WBC UR QL: ABNORMAL
WBC UR QL: SIGNIFICANT CHANGE UP

## 2021-01-01 PROCEDURE — 71045 X-RAY EXAM CHEST 1 VIEW: CPT | Mod: 26

## 2021-01-01 PROCEDURE — 99233 SBSQ HOSP IP/OBS HIGH 50: CPT | Mod: CS

## 2021-01-01 PROCEDURE — 99232 SBSQ HOSP IP/OBS MODERATE 35: CPT | Mod: CS

## 2021-01-01 PROCEDURE — 32551 INSERTION OF CHEST TUBE: CPT

## 2021-01-01 PROCEDURE — 99497 ADVNCD CARE PLAN 30 MIN: CPT | Mod: 33

## 2021-01-01 PROCEDURE — 71045 X-RAY EXAM CHEST 1 VIEW: CPT | Mod: 26,77

## 2021-01-01 PROCEDURE — 99291 CRITICAL CARE FIRST HOUR: CPT | Mod: CS

## 2021-01-01 PROCEDURE — 99232 SBSQ HOSP IP/OBS MODERATE 35: CPT

## 2021-01-01 PROCEDURE — 31500 INSERT EMERGENCY AIRWAY: CPT

## 2021-01-01 PROCEDURE — 93000 ELECTROCARDIOGRAM COMPLETE: CPT | Mod: 59

## 2021-01-01 PROCEDURE — 90937 HEMODIALYSIS REPEATED EVAL: CPT

## 2021-01-01 PROCEDURE — 82270 OCCULT BLOOD FECES: CPT

## 2021-01-01 PROCEDURE — 93010 ELECTROCARDIOGRAM REPORT: CPT

## 2021-01-01 PROCEDURE — 99223 1ST HOSP IP/OBS HIGH 75: CPT

## 2021-01-01 PROCEDURE — 99223 1ST HOSP IP/OBS HIGH 75: CPT | Mod: CS,25

## 2021-01-01 PROCEDURE — 99223 1ST HOSP IP/OBS HIGH 75: CPT | Mod: CS

## 2021-01-01 PROCEDURE — 71275 CT ANGIOGRAPHY CHEST: CPT | Mod: 26

## 2021-01-01 PROCEDURE — 76937 US GUIDE VASCULAR ACCESS: CPT | Mod: 26

## 2021-01-01 PROCEDURE — 99497 ADVNCD CARE PLAN 30 MIN: CPT | Mod: 25

## 2021-01-01 PROCEDURE — G0438: CPT

## 2021-01-01 PROCEDURE — 71045 X-RAY EXAM CHEST 1 VIEW: CPT | Mod: 26,76,77

## 2021-01-01 PROCEDURE — 99441: CPT | Mod: CS,95

## 2021-01-01 PROCEDURE — 36800 INSERTION OF CANNULA: CPT

## 2021-01-01 PROCEDURE — 36415 COLL VENOUS BLD VENIPUNCTURE: CPT

## 2021-01-01 PROCEDURE — 93970 EXTREMITY STUDY: CPT | Mod: 26

## 2021-01-01 PROCEDURE — 93306 TTE W/DOPPLER COMPLETE: CPT | Mod: 26

## 2021-01-01 PROCEDURE — 71045 X-RAY EXAM CHEST 1 VIEW: CPT | Mod: 26,76

## 2021-01-01 RX ORDER — DEXTROSE 50 % IN WATER 50 %
25 SYRINGE (ML) INTRAVENOUS ONCE
Refills: 0 | Status: DISCONTINUED | OUTPATIENT
Start: 2021-01-01 | End: 2021-01-01

## 2021-01-01 RX ORDER — CISATRACURIUM BESYLATE 2 MG/ML
3 INJECTION INTRAVENOUS
Qty: 200 | Refills: 0 | Status: DISCONTINUED | OUTPATIENT
Start: 2021-01-01 | End: 2021-01-01

## 2021-01-01 RX ORDER — CHLORHEXIDINE GLUCONATE 213 G/1000ML
15 SOLUTION TOPICAL EVERY 12 HOURS
Refills: 0 | Status: DISCONTINUED | OUTPATIENT
Start: 2021-01-01 | End: 2021-01-01

## 2021-01-01 RX ORDER — OMEPRAZOLE 10 MG/1
1 CAPSULE, DELAYED RELEASE ORAL
Qty: 0 | Refills: 0 | DISCHARGE

## 2021-01-01 RX ORDER — CHLORHEXIDINE GLUCONATE 213 G/1000ML
1 SOLUTION TOPICAL
Refills: 0 | Status: DISCONTINUED | OUTPATIENT
Start: 2021-01-01 | End: 2021-01-01

## 2021-01-01 RX ORDER — SODIUM CHLORIDE 9 MG/ML
1000 INJECTION, SOLUTION INTRAVENOUS
Refills: 0 | Status: DISCONTINUED | OUTPATIENT
Start: 2021-01-01 | End: 2021-01-01

## 2021-01-01 RX ORDER — ETOMIDATE 2 MG/ML
20 INJECTION INTRAVENOUS ONCE
Refills: 0 | Status: COMPLETED | OUTPATIENT
Start: 2021-01-01 | End: 2021-01-01

## 2021-01-01 RX ORDER — DEXTROSE 50 % IN WATER 50 %
50 SYRINGE (ML) INTRAVENOUS ONCE
Refills: 0 | Status: COMPLETED | OUTPATIENT
Start: 2021-01-01 | End: 2021-01-01

## 2021-01-01 RX ORDER — SODIUM BICARBONATE 1 MEQ/ML
100 SYRINGE (ML) INTRAVENOUS ONCE
Refills: 0 | Status: COMPLETED | OUTPATIENT
Start: 2021-01-01 | End: 2021-01-01

## 2021-01-01 RX ORDER — HEPARIN SODIUM 5000 [USP'U]/ML
300 INJECTION INTRAVENOUS; SUBCUTANEOUS
Qty: 10000 | Refills: 0 | Status: DISCONTINUED | OUTPATIENT
Start: 2021-01-01 | End: 2021-01-01

## 2021-01-01 RX ORDER — PANTOPRAZOLE SODIUM 20 MG/1
40 TABLET, DELAYED RELEASE ORAL DAILY
Refills: 0 | Status: DISCONTINUED | OUTPATIENT
Start: 2021-01-01 | End: 2021-01-01

## 2021-01-01 RX ORDER — VANCOMYCIN HCL 1 G
1000 VIAL (EA) INTRAVENOUS
Refills: 0 | Status: DISCONTINUED | OUTPATIENT
Start: 2021-01-01 | End: 2021-01-01

## 2021-01-01 RX ORDER — ASPIRIN/CALCIUM CARB/MAGNESIUM 324 MG
81 TABLET ORAL DAILY
Refills: 0 | Status: DISCONTINUED | OUTPATIENT
Start: 2021-01-01 | End: 2021-01-01

## 2021-01-01 RX ORDER — ACETAMINOPHEN 500 MG
1000 TABLET ORAL ONCE
Refills: 0 | Status: COMPLETED | OUTPATIENT
Start: 2021-01-01 | End: 2021-01-01

## 2021-01-01 RX ORDER — DEXTROSE 50 % IN WATER 50 %
25 SYRINGE (ML) INTRAVENOUS ONCE
Refills: 0 | Status: COMPLETED | OUTPATIENT
Start: 2021-01-01 | End: 2021-01-01

## 2021-01-01 RX ORDER — GLUCAGON INJECTION, SOLUTION 0.5 MG/.1ML
1 INJECTION, SOLUTION SUBCUTANEOUS ONCE
Refills: 0 | Status: DISCONTINUED | OUTPATIENT
Start: 2021-01-01 | End: 2021-01-01

## 2021-01-01 RX ORDER — MORPHINE SULFATE 50 MG/1
2 CAPSULE, EXTENDED RELEASE ORAL EVERY 6 HOURS
Refills: 0 | Status: DISCONTINUED | OUTPATIENT
Start: 2021-01-01 | End: 2021-01-01

## 2021-01-01 RX ORDER — PANTOPRAZOLE SODIUM 20 MG/1
40 TABLET, DELAYED RELEASE ORAL
Refills: 0 | Status: DISCONTINUED | OUTPATIENT
Start: 2021-01-01 | End: 2021-01-01

## 2021-01-01 RX ORDER — MEROPENEM 1 G/30ML
500 INJECTION INTRAVENOUS ONCE
Refills: 0 | Status: COMPLETED | OUTPATIENT
Start: 2021-01-01 | End: 2021-01-01

## 2021-01-01 RX ORDER — LANOLIN ALCOHOL/MO/W.PET/CERES
5 CREAM (GRAM) TOPICAL AT BEDTIME
Refills: 0 | Status: DISCONTINUED | OUTPATIENT
Start: 2021-01-01 | End: 2021-01-01

## 2021-01-01 RX ORDER — ENOXAPARIN SODIUM 100 MG/ML
80 INJECTION SUBCUTANEOUS
Refills: 0 | Status: DISCONTINUED | OUTPATIENT
Start: 2021-01-01 | End: 2021-01-01

## 2021-01-01 RX ORDER — LACTULOSE 10 G/15ML
20 SOLUTION ORAL ONCE
Refills: 0 | Status: COMPLETED | OUTPATIENT
Start: 2021-01-01 | End: 2021-01-01

## 2021-01-01 RX ORDER — ROCURONIUM BROMIDE 10 MG/ML
50 VIAL (ML) INTRAVENOUS ONCE
Refills: 0 | Status: COMPLETED | OUTPATIENT
Start: 2021-01-01 | End: 2021-01-01

## 2021-01-01 RX ORDER — VANCOMYCIN HCL 1 G
1000 VIAL (EA) INTRAVENOUS ONCE
Refills: 0 | Status: COMPLETED | OUTPATIENT
Start: 2021-01-01 | End: 2021-01-01

## 2021-01-01 RX ORDER — CLONAZEPAM 1 MG
0.5 TABLET ORAL EVERY 8 HOURS
Refills: 0 | Status: DISCONTINUED | OUTPATIENT
Start: 2021-01-01 | End: 2021-01-01

## 2021-01-01 RX ORDER — DEXAMETHASONE 0.5 MG/5ML
10 ELIXIR ORAL ONCE
Refills: 0 | Status: COMPLETED | OUTPATIENT
Start: 2021-01-01 | End: 2021-01-01

## 2021-01-01 RX ORDER — MULTIVIT-MIN/FERROUS GLUCONATE 9 MG/15 ML
15 LIQUID (ML) ORAL DAILY
Refills: 0 | Status: DISCONTINUED | OUTPATIENT
Start: 2021-01-01 | End: 2021-01-01

## 2021-01-01 RX ORDER — MORPHINE SULFATE 50 MG/1
2 CAPSULE, EXTENDED RELEASE ORAL ONCE
Refills: 0 | Status: DISCONTINUED | OUTPATIENT
Start: 2021-01-01 | End: 2021-01-01

## 2021-01-01 RX ORDER — MORPHINE SULFATE 50 MG/1
1 CAPSULE, EXTENDED RELEASE ORAL ONCE
Refills: 0 | Status: DISCONTINUED | OUTPATIENT
Start: 2021-01-01 | End: 2021-01-01

## 2021-01-01 RX ORDER — REMDESIVIR 5 MG/ML
100 INJECTION INTRAVENOUS EVERY 24 HOURS
Refills: 0 | Status: COMPLETED | OUTPATIENT
Start: 2021-01-01 | End: 2021-01-01

## 2021-01-01 RX ORDER — SODIUM POLYSTYRENE SULFONATE 4.1 MEQ/G
30 POWDER, FOR SUSPENSION ORAL ONCE
Refills: 0 | Status: COMPLETED | OUTPATIENT
Start: 2021-01-01 | End: 2021-01-01

## 2021-01-01 RX ORDER — ACETAMINOPHEN 500 MG
650 TABLET ORAL ONCE
Refills: 0 | Status: COMPLETED | OUTPATIENT
Start: 2021-01-01 | End: 2021-01-01

## 2021-01-01 RX ORDER — CALCIUM GLUCONATE 100 MG/ML
1 VIAL (ML) INTRAVENOUS EVERY 6 HOURS
Refills: 0 | Status: DISCONTINUED | OUTPATIENT
Start: 2021-01-01 | End: 2021-01-01

## 2021-01-01 RX ORDER — REMDESIVIR 5 MG/ML
INJECTION INTRAVENOUS
Refills: 0 | Status: COMPLETED | OUTPATIENT
Start: 2021-01-01 | End: 2021-01-01

## 2021-01-01 RX ORDER — SODIUM BICARBONATE 1 MEQ/ML
0.28 SYRINGE (ML) INTRAVENOUS
Qty: 150 | Refills: 0 | Status: DISCONTINUED | OUTPATIENT
Start: 2021-01-01 | End: 2021-01-01

## 2021-01-01 RX ORDER — PHENYLEPHRINE HYDROCHLORIDE 10 MG/ML
1 INJECTION INTRAVENOUS
Qty: 160 | Refills: 0 | Status: DISCONTINUED | OUTPATIENT
Start: 2021-01-01 | End: 2021-01-01

## 2021-01-01 RX ORDER — DEXTROSE 50 % IN WATER 50 %
15 SYRINGE (ML) INTRAVENOUS ONCE
Refills: 0 | Status: DISCONTINUED | OUTPATIENT
Start: 2021-01-01 | End: 2021-01-01

## 2021-01-01 RX ORDER — INSULIN HUMAN 100 [IU]/ML
10 INJECTION, SOLUTION SUBCUTANEOUS ONCE
Refills: 0 | Status: COMPLETED | OUTPATIENT
Start: 2021-01-01 | End: 2021-01-01

## 2021-01-01 RX ORDER — SODIUM CHLORIDE 9 MG/ML
1000 INJECTION INTRAMUSCULAR; INTRAVENOUS; SUBCUTANEOUS ONCE
Refills: 0 | Status: COMPLETED | OUTPATIENT
Start: 2021-01-01 | End: 2021-01-01

## 2021-01-01 RX ORDER — DEXTROSE 50 % IN WATER 50 %
12.5 SYRINGE (ML) INTRAVENOUS ONCE
Refills: 0 | Status: DISCONTINUED | OUTPATIENT
Start: 2021-01-01 | End: 2021-01-01

## 2021-01-01 RX ORDER — ASPIRIN/CALCIUM CARB/MAGNESIUM 324 MG
1 TABLET ORAL
Qty: 0 | Refills: 0 | DISCHARGE

## 2021-01-01 RX ORDER — MORPHINE SULFATE 50 MG/1
2 CAPSULE, EXTENDED RELEASE ORAL EVERY 4 HOURS
Refills: 0 | Status: DISCONTINUED | OUTPATIENT
Start: 2021-01-01 | End: 2021-01-01

## 2021-01-01 RX ORDER — DEXMEDETOMIDINE HYDROCHLORIDE IN 0.9% SODIUM CHLORIDE 4 UG/ML
0.3 INJECTION INTRAVENOUS
Qty: 200 | Refills: 0 | Status: DISCONTINUED | OUTPATIENT
Start: 2021-01-01 | End: 2021-01-01

## 2021-01-01 RX ORDER — SUCCINYLCHOLINE CHLORIDE 100 MG/5ML
100 SYRINGE (ML) INTRAVENOUS ONCE
Refills: 0 | Status: COMPLETED | OUTPATIENT
Start: 2021-01-01 | End: 2021-01-01

## 2021-01-01 RX ORDER — CLONAZEPAM 1 MG
1 TABLET ORAL EVERY 8 HOURS
Refills: 0 | Status: DISCONTINUED | OUTPATIENT
Start: 2021-01-01 | End: 2021-01-01

## 2021-01-01 RX ORDER — CHOLECALCIFEROL (VITAMIN D3) 125 MCG
1 CAPSULE ORAL
Qty: 0 | Refills: 0 | DISCHARGE

## 2021-01-01 RX ORDER — TOCILIZUMAB 20 MG/ML
600 INJECTION, SOLUTION, CONCENTRATE INTRAVENOUS ONCE
Refills: 0 | Status: COMPLETED | OUTPATIENT
Start: 2021-01-01 | End: 2021-01-01

## 2021-01-01 RX ORDER — MEROPENEM 1 G/30ML
500 INJECTION INTRAVENOUS EVERY 12 HOURS
Refills: 0 | Status: DISCONTINUED | OUTPATIENT
Start: 2021-01-01 | End: 2021-01-01

## 2021-01-01 RX ORDER — FENTANYL CITRATE 50 UG/ML
50 INJECTION INTRAVENOUS ONCE
Refills: 0 | Status: DISCONTINUED | OUTPATIENT
Start: 2021-01-01 | End: 2021-01-01

## 2021-01-01 RX ORDER — SODIUM CHLORIDE 9 MG/ML
10 INJECTION INTRAMUSCULAR; INTRAVENOUS; SUBCUTANEOUS
Refills: 0 | Status: DISCONTINUED | OUTPATIENT
Start: 2021-01-01 | End: 2021-01-01

## 2021-01-01 RX ORDER — VASOPRESSIN 20 [USP'U]/ML
0.04 INJECTION INTRAVENOUS
Qty: 50 | Refills: 0 | Status: DISCONTINUED | OUTPATIENT
Start: 2021-01-01 | End: 2021-01-01

## 2021-01-01 RX ORDER — CHOLECALCIFEROL (VITAMIN D3) 125 MCG
2000 CAPSULE ORAL DAILY
Refills: 0 | Status: DISCONTINUED | OUTPATIENT
Start: 2021-01-01 | End: 2021-01-01

## 2021-01-01 RX ORDER — CALCIUM GLUCONATE 100 MG/ML
1 VIAL (ML) INTRAVENOUS ONCE
Refills: 0 | Status: COMPLETED | OUTPATIENT
Start: 2021-01-01 | End: 2021-01-01

## 2021-01-01 RX ORDER — CALCIUM GLUCONATE 100 MG/ML
2 VIAL (ML) INTRAVENOUS ONCE
Refills: 0 | Status: COMPLETED | OUTPATIENT
Start: 2021-01-01 | End: 2021-01-01

## 2021-01-01 RX ORDER — ALBUTEROL 90 UG/1
2 AEROSOL, METERED ORAL EVERY 6 HOURS
Refills: 0 | Status: DISCONTINUED | OUTPATIENT
Start: 2021-01-01 | End: 2021-01-01

## 2021-01-01 RX ORDER — MEROPENEM 1 G/30ML
1000 INJECTION INTRAVENOUS EVERY 8 HOURS
Refills: 0 | Status: DISCONTINUED | OUTPATIENT
Start: 2021-01-01 | End: 2021-01-01

## 2021-01-01 RX ORDER — DEXTROSE 10 % IN WATER 10 %
1000 INTRAVENOUS SOLUTION INTRAVENOUS
Refills: 0 | Status: DISCONTINUED | OUTPATIENT
Start: 2021-01-01 | End: 2021-01-01

## 2021-01-01 RX ORDER — ACETAMINOPHEN 500 MG
650 TABLET ORAL EVERY 6 HOURS
Refills: 0 | Status: DISCONTINUED | OUTPATIENT
Start: 2021-01-01 | End: 2021-01-01

## 2021-01-01 RX ORDER — PROPOFOL 10 MG/ML
10 INJECTION, EMULSION INTRAVENOUS
Qty: 1000 | Refills: 0 | Status: DISCONTINUED | OUTPATIENT
Start: 2021-01-01 | End: 2021-01-01

## 2021-01-01 RX ORDER — REMDESIVIR 5 MG/ML
200 INJECTION INTRAVENOUS EVERY 24 HOURS
Refills: 0 | Status: COMPLETED | OUTPATIENT
Start: 2021-01-01 | End: 2021-01-01

## 2021-01-01 RX ORDER — FAMOTIDINE 10 MG/ML
20 INJECTION INTRAVENOUS ONCE
Refills: 0 | Status: COMPLETED | OUTPATIENT
Start: 2021-01-01 | End: 2021-01-01

## 2021-01-01 RX ORDER — NOREPINEPHRINE BITARTRATE/D5W 8 MG/250ML
0.05 PLASTIC BAG, INJECTION (ML) INTRAVENOUS
Qty: 8 | Refills: 0 | Status: DISCONTINUED | OUTPATIENT
Start: 2021-01-01 | End: 2021-01-01

## 2021-01-01 RX ORDER — PROPOFOL 10 MG/ML
50 INJECTION, EMULSION INTRAVENOUS ONCE
Refills: 0 | Status: COMPLETED | OUTPATIENT
Start: 2021-01-01 | End: 2021-01-01

## 2021-01-01 RX ORDER — FENTANYL CITRATE 50 UG/ML
0.5 INJECTION INTRAVENOUS
Qty: 2500 | Refills: 0 | Status: DISCONTINUED | OUTPATIENT
Start: 2021-01-01 | End: 2021-01-01

## 2021-01-01 RX ORDER — DEXAMETHASONE 0.5 MG/5ML
6 ELIXIR ORAL DAILY
Refills: 0 | Status: DISCONTINUED | OUTPATIENT
Start: 2021-01-01 | End: 2021-01-01

## 2021-01-01 RX ORDER — SODIUM BICARBONATE 1 MEQ/ML
0.18 SYRINGE (ML) INTRAVENOUS
Qty: 150 | Refills: 0 | Status: DISCONTINUED | OUTPATIENT
Start: 2021-01-01 | End: 2021-01-01

## 2021-01-01 RX ORDER — INSULIN LISPRO 100/ML
VIAL (ML) SUBCUTANEOUS EVERY 6 HOURS
Refills: 0 | Status: DISCONTINUED | OUTPATIENT
Start: 2021-01-01 | End: 2021-01-01

## 2021-01-01 RX ORDER — ENOXAPARIN SODIUM 100 MG/ML
40 INJECTION SUBCUTANEOUS DAILY
Refills: 0 | Status: DISCONTINUED | OUTPATIENT
Start: 2021-01-01 | End: 2021-01-01

## 2021-01-01 RX ADMIN — Medication 2000 UNIT(S): at 11:28

## 2021-01-01 RX ADMIN — Medication 40 MILLIGRAM(S): at 21:05

## 2021-01-01 RX ADMIN — MEROPENEM 100 MILLIGRAM(S): 1 INJECTION INTRAVENOUS at 23:42

## 2021-01-01 RX ADMIN — CHLORHEXIDINE GLUCONATE 15 MILLILITER(S): 213 SOLUTION TOPICAL at 05:13

## 2021-01-01 RX ADMIN — DEXMEDETOMIDINE HYDROCHLORIDE IN 0.9% SODIUM CHLORIDE 6.16 MICROGRAM(S)/KG/HR: 4 INJECTION INTRAVENOUS at 09:10

## 2021-01-01 RX ADMIN — Medication 250 MILLIGRAM(S): at 12:34

## 2021-01-01 RX ADMIN — DEXMEDETOMIDINE HYDROCHLORIDE IN 0.9% SODIUM CHLORIDE 6.16 MICROGRAM(S)/KG/HR: 4 INJECTION INTRAVENOUS at 17:09

## 2021-01-01 RX ADMIN — CHLORHEXIDINE GLUCONATE 15 MILLILITER(S): 213 SOLUTION TOPICAL at 17:37

## 2021-01-01 RX ADMIN — PROPOFOL 4.93 MICROGRAM(S)/KG/MIN: 10 INJECTION, EMULSION INTRAVENOUS at 06:04

## 2021-01-01 RX ADMIN — MORPHINE SULFATE 1 MILLIGRAM(S): 50 CAPSULE, EXTENDED RELEASE ORAL at 23:12

## 2021-01-01 RX ADMIN — Medication 40 MILLIGRAM(S): at 22:36

## 2021-01-01 RX ADMIN — Medication 81 MILLIGRAM(S): at 13:19

## 2021-01-01 RX ADMIN — VASOPRESSIN 2.4 UNIT(S)/MIN: 20 INJECTION INTRAVENOUS at 18:01

## 2021-01-01 RX ADMIN — Medication 40 MILLIGRAM(S): at 21:48

## 2021-01-01 RX ADMIN — Medication 650 MILLIGRAM(S): at 18:10

## 2021-01-01 RX ADMIN — Medication 100 MILLIGRAM(S): at 01:50

## 2021-01-01 RX ADMIN — Medication 0.5 MILLIGRAM(S): at 22:00

## 2021-01-01 RX ADMIN — Medication 0.5 MILLIGRAM(S): at 05:41

## 2021-01-01 RX ADMIN — ENOXAPARIN SODIUM 80 MILLIGRAM(S): 100 INJECTION SUBCUTANEOUS at 17:50

## 2021-01-01 RX ADMIN — Medication 7.7 MICROGRAM(S)/KG/MIN: at 14:23

## 2021-01-01 RX ADMIN — Medication 1 MILLIGRAM(S): at 13:55

## 2021-01-01 RX ADMIN — Medication 0.5 MILLIGRAM(S): at 05:02

## 2021-01-01 RX ADMIN — Medication 0.5 MILLIGRAM(S): at 21:48

## 2021-01-01 RX ADMIN — PROPOFOL 4.93 MICROGRAM(S)/KG/MIN: 10 INJECTION, EMULSION INTRAVENOUS at 07:42

## 2021-01-01 RX ADMIN — PHENYLEPHRINE HYDROCHLORIDE 15.4 MICROGRAM(S)/KG/MIN: 10 INJECTION INTRAVENOUS at 10:10

## 2021-01-01 RX ADMIN — Medication 250 MILLIGRAM(S): at 09:50

## 2021-01-01 RX ADMIN — ENOXAPARIN SODIUM 80 MILLIGRAM(S): 100 INJECTION SUBCUTANEOUS at 05:40

## 2021-01-01 RX ADMIN — Medication 40 MILLIGRAM(S): at 05:16

## 2021-01-01 RX ADMIN — Medication 1 MILLIGRAM(S): at 23:35

## 2021-01-01 RX ADMIN — CISATRACURIUM BESYLATE 14.8 MICROGRAM(S)/KG/MIN: 2 INJECTION INTRAVENOUS at 23:45

## 2021-01-01 RX ADMIN — Medication 50 MILLIGRAM(S): at 17:25

## 2021-01-01 RX ADMIN — Medication 200 GRAM(S): at 09:59

## 2021-01-01 RX ADMIN — Medication 25 GRAM(S): at 11:38

## 2021-01-01 RX ADMIN — Medication 40 MILLIGRAM(S): at 14:14

## 2021-01-01 RX ADMIN — Medication 100 MEQ/KG/HR: at 13:32

## 2021-01-01 RX ADMIN — MORPHINE SULFATE 2 MILLIGRAM(S): 50 CAPSULE, EXTENDED RELEASE ORAL at 00:29

## 2021-01-01 RX ADMIN — Medication 650 MILLIGRAM(S): at 08:35

## 2021-01-01 RX ADMIN — Medication 40 MILLIGRAM(S): at 06:04

## 2021-01-01 RX ADMIN — REMDESIVIR 500 MILLIGRAM(S): 5 INJECTION INTRAVENOUS at 00:31

## 2021-01-01 RX ADMIN — Medication 40 MILLIGRAM(S): at 06:35

## 2021-01-01 RX ADMIN — DEXMEDETOMIDINE HYDROCHLORIDE IN 0.9% SODIUM CHLORIDE 6.16 MICROGRAM(S)/KG/HR: 4 INJECTION INTRAVENOUS at 10:10

## 2021-01-01 RX ADMIN — Medication 2000 UNIT(S): at 12:04

## 2021-01-01 RX ADMIN — DEXMEDETOMIDINE HYDROCHLORIDE IN 0.9% SODIUM CHLORIDE 6.16 MICROGRAM(S)/KG/HR: 4 INJECTION INTRAVENOUS at 18:46

## 2021-01-01 RX ADMIN — Medication 40 MILLIGRAM(S): at 13:19

## 2021-01-01 RX ADMIN — Medication 100 MILLIEQUIVALENT(S): at 18:01

## 2021-01-01 RX ADMIN — Medication 40 MILLIGRAM(S): at 20:45

## 2021-01-01 RX ADMIN — ENOXAPARIN SODIUM 80 MILLIGRAM(S): 100 INJECTION SUBCUTANEOUS at 17:37

## 2021-01-01 RX ADMIN — Medication 100 GRAM(S): at 00:00

## 2021-01-01 RX ADMIN — DEXMEDETOMIDINE HYDROCHLORIDE IN 0.9% SODIUM CHLORIDE 6.16 MICROGRAM(S)/KG/HR: 4 INJECTION INTRAVENOUS at 18:34

## 2021-01-01 RX ADMIN — PANTOPRAZOLE SODIUM 40 MILLIGRAM(S): 20 TABLET, DELAYED RELEASE ORAL at 14:17

## 2021-01-01 RX ADMIN — MORPHINE SULFATE 2 MILLIGRAM(S): 50 CAPSULE, EXTENDED RELEASE ORAL at 13:55

## 2021-01-01 RX ADMIN — Medication 2000 UNIT(S): at 13:19

## 2021-01-01 RX ADMIN — PROPOFOL 4.93 MICROGRAM(S)/KG/MIN: 10 INJECTION, EMULSION INTRAVENOUS at 11:23

## 2021-01-01 RX ADMIN — FENTANYL CITRATE 4.11 MICROGRAM(S)/KG/HR: 50 INJECTION INTRAVENOUS at 09:16

## 2021-01-01 RX ADMIN — Medication 40 MILLIGRAM(S): at 22:54

## 2021-01-01 RX ADMIN — ENOXAPARIN SODIUM 80 MILLIGRAM(S): 100 INJECTION SUBCUTANEOUS at 05:16

## 2021-01-01 RX ADMIN — VASOPRESSIN 2.4 UNIT(S)/MIN: 20 INJECTION INTRAVENOUS at 23:45

## 2021-01-01 RX ADMIN — Medication 40 MILLIGRAM(S): at 05:49

## 2021-01-01 RX ADMIN — Medication 1 MILLIGRAM(S): at 10:09

## 2021-01-01 RX ADMIN — ENOXAPARIN SODIUM 40 MILLIGRAM(S): 100 INJECTION SUBCUTANEOUS at 08:46

## 2021-01-01 RX ADMIN — FENTANYL CITRATE 4.11 MICROGRAM(S)/KG/HR: 50 INJECTION INTRAVENOUS at 23:44

## 2021-01-01 RX ADMIN — Medication 7.7 MICROGRAM(S)/KG/MIN: at 04:24

## 2021-01-01 RX ADMIN — PROPOFOL 4.93 MICROGRAM(S)/KG/MIN: 10 INJECTION, EMULSION INTRAVENOUS at 17:17

## 2021-01-01 RX ADMIN — Medication 7.7 MICROGRAM(S)/KG/MIN: at 11:23

## 2021-01-01 RX ADMIN — Medication 0.5 MILLIGRAM(S): at 21:31

## 2021-01-01 RX ADMIN — Medication 6 MILLIGRAM(S): at 05:21

## 2021-01-01 RX ADMIN — REMDESIVIR 500 MILLIGRAM(S): 5 INJECTION INTRAVENOUS at 00:14

## 2021-01-01 RX ADMIN — Medication 1 TABLET(S): at 12:13

## 2021-01-01 RX ADMIN — Medication 100 MEQ/KG/HR: at 18:02

## 2021-01-01 RX ADMIN — FENTANYL CITRATE 4.11 MICROGRAM(S)/KG/HR: 50 INJECTION INTRAVENOUS at 18:06

## 2021-01-01 RX ADMIN — DEXMEDETOMIDINE HYDROCHLORIDE IN 0.9% SODIUM CHLORIDE 6.16 MICROGRAM(S)/KG/HR: 4 INJECTION INTRAVENOUS at 20:45

## 2021-01-01 RX ADMIN — DEXMEDETOMIDINE HYDROCHLORIDE IN 0.9% SODIUM CHLORIDE 6.16 MICROGRAM(S)/KG/HR: 4 INJECTION INTRAVENOUS at 05:31

## 2021-01-01 RX ADMIN — PROPOFOL 4.93 MICROGRAM(S)/KG/MIN: 10 INJECTION, EMULSION INTRAVENOUS at 17:38

## 2021-01-01 RX ADMIN — MEROPENEM 100 MILLIGRAM(S): 1 INJECTION INTRAVENOUS at 11:59

## 2021-01-01 RX ADMIN — REMDESIVIR 500 MILLIGRAM(S): 5 INJECTION INTRAVENOUS at 00:46

## 2021-01-01 RX ADMIN — ENOXAPARIN SODIUM 40 MILLIGRAM(S): 100 INJECTION SUBCUTANEOUS at 00:03

## 2021-01-01 RX ADMIN — Medication 0.5 MILLIGRAM(S): at 05:13

## 2021-01-01 RX ADMIN — PANTOPRAZOLE SODIUM 40 MILLIGRAM(S): 20 TABLET, DELAYED RELEASE ORAL at 11:02

## 2021-01-01 RX ADMIN — CISATRACURIUM BESYLATE 14.8 MICROGRAM(S)/KG/MIN: 2 INJECTION INTRAVENOUS at 12:52

## 2021-01-01 RX ADMIN — ENOXAPARIN SODIUM 40 MILLIGRAM(S): 100 INJECTION SUBCUTANEOUS at 11:20

## 2021-01-01 RX ADMIN — Medication 2000 UNIT(S): at 12:13

## 2021-01-01 RX ADMIN — DEXMEDETOMIDINE HYDROCHLORIDE IN 0.9% SODIUM CHLORIDE 6.16 MICROGRAM(S)/KG/HR: 4 INJECTION INTRAVENOUS at 05:29

## 2021-01-01 RX ADMIN — Medication 250 MILLIGRAM(S): at 10:32

## 2021-01-01 RX ADMIN — PHENYLEPHRINE HYDROCHLORIDE 15.4 MICROGRAM(S)/KG/MIN: 10 INJECTION INTRAVENOUS at 06:50

## 2021-01-01 RX ADMIN — Medication 400 MILLIGRAM(S): at 17:54

## 2021-01-01 RX ADMIN — FAMOTIDINE 20 MILLIGRAM(S): 10 INJECTION INTRAVENOUS at 18:09

## 2021-01-01 RX ADMIN — PHENYLEPHRINE HYDROCHLORIDE 15.4 MICROGRAM(S)/KG/MIN: 10 INJECTION INTRAVENOUS at 18:22

## 2021-01-01 RX ADMIN — DEXMEDETOMIDINE HYDROCHLORIDE IN 0.9% SODIUM CHLORIDE 6.16 MICROGRAM(S)/KG/HR: 4 INJECTION INTRAVENOUS at 00:49

## 2021-01-01 RX ADMIN — Medication 7.7 MICROGRAM(S)/KG/MIN: at 23:29

## 2021-01-01 RX ADMIN — Medication 81 MILLIGRAM(S): at 11:34

## 2021-01-01 RX ADMIN — Medication 81 MILLIGRAM(S): at 12:13

## 2021-01-01 RX ADMIN — Medication 40 MILLIGRAM(S): at 13:04

## 2021-01-01 RX ADMIN — Medication 81 MILLIGRAM(S): at 11:20

## 2021-01-01 RX ADMIN — ENOXAPARIN SODIUM 80 MILLIGRAM(S): 100 INJECTION SUBCUTANEOUS at 05:49

## 2021-01-01 RX ADMIN — DEXMEDETOMIDINE HYDROCHLORIDE IN 0.9% SODIUM CHLORIDE 6.16 MICROGRAM(S)/KG/HR: 4 INJECTION INTRAVENOUS at 11:31

## 2021-01-01 RX ADMIN — Medication 7.7 MICROGRAM(S)/KG/MIN: at 21:00

## 2021-01-01 RX ADMIN — PANTOPRAZOLE SODIUM 40 MILLIGRAM(S): 20 TABLET, DELAYED RELEASE ORAL at 13:29

## 2021-01-01 RX ADMIN — Medication 7.7 MICROGRAM(S)/KG/MIN: at 23:44

## 2021-01-01 RX ADMIN — REMDESIVIR 500 MILLIGRAM(S): 5 INJECTION INTRAVENOUS at 00:24

## 2021-01-01 RX ADMIN — Medication 1 TABLET(S): at 11:28

## 2021-01-01 RX ADMIN — MORPHINE SULFATE 2 MILLIGRAM(S): 50 CAPSULE, EXTENDED RELEASE ORAL at 06:17

## 2021-01-01 RX ADMIN — DEXMEDETOMIDINE HYDROCHLORIDE IN 0.9% SODIUM CHLORIDE 6.16 MICROGRAM(S)/KG/HR: 4 INJECTION INTRAVENOUS at 12:28

## 2021-01-01 RX ADMIN — Medication 1000 MILLIGRAM(S): at 23:42

## 2021-01-01 RX ADMIN — Medication 0.5 MILLIGRAM(S): at 20:29

## 2021-01-01 RX ADMIN — Medication 40 MILLIGRAM(S): at 05:57

## 2021-01-01 RX ADMIN — Medication 0.5 MILLIGRAM(S): at 14:53

## 2021-01-01 RX ADMIN — Medication 81 MILLIGRAM(S): at 12:26

## 2021-01-01 RX ADMIN — Medication 100 GRAM(S): at 05:13

## 2021-01-01 RX ADMIN — Medication 40 MILLIGRAM(S): at 05:41

## 2021-01-01 RX ADMIN — Medication 7.7 MICROGRAM(S)/KG/MIN: at 09:18

## 2021-01-01 RX ADMIN — Medication 2000 UNIT(S): at 11:53

## 2021-01-01 RX ADMIN — Medication 0.5 MILLIGRAM(S): at 05:49

## 2021-01-01 RX ADMIN — Medication 6 MILLIGRAM(S): at 05:52

## 2021-01-01 RX ADMIN — Medication 650 MILLIGRAM(S): at 08:47

## 2021-01-01 RX ADMIN — Medication 2000 UNIT(S): at 11:33

## 2021-01-01 RX ADMIN — PANTOPRAZOLE SODIUM 40 MILLIGRAM(S): 20 TABLET, DELAYED RELEASE ORAL at 11:34

## 2021-01-01 RX ADMIN — Medication 2000 UNIT(S): at 11:34

## 2021-01-01 RX ADMIN — Medication 2000 UNIT(S): at 12:14

## 2021-01-01 RX ADMIN — MORPHINE SULFATE 2 MILLIGRAM(S): 50 CAPSULE, EXTENDED RELEASE ORAL at 07:55

## 2021-01-01 RX ADMIN — ENOXAPARIN SODIUM 80 MILLIGRAM(S): 100 INJECTION SUBCUTANEOUS at 17:13

## 2021-01-01 RX ADMIN — Medication 2: at 17:53

## 2021-01-01 RX ADMIN — PROPOFOL 4.93 MICROGRAM(S)/KG/MIN: 10 INJECTION, EMULSION INTRAVENOUS at 04:24

## 2021-01-01 RX ADMIN — MEROPENEM 100 MILLIGRAM(S): 1 INJECTION INTRAVENOUS at 05:10

## 2021-01-01 RX ADMIN — CHLORHEXIDINE GLUCONATE 15 MILLILITER(S): 213 SOLUTION TOPICAL at 17:33

## 2021-01-01 RX ADMIN — Medication 40 MILLIGRAM(S): at 05:13

## 2021-01-01 RX ADMIN — REMDESIVIR 500 MILLIGRAM(S): 5 INJECTION INTRAVENOUS at 00:58

## 2021-01-01 RX ADMIN — ENOXAPARIN SODIUM 80 MILLIGRAM(S): 100 INJECTION SUBCUTANEOUS at 06:40

## 2021-01-01 RX ADMIN — PANTOPRAZOLE SODIUM 40 MILLIGRAM(S): 20 TABLET, DELAYED RELEASE ORAL at 12:36

## 2021-01-01 RX ADMIN — Medication 6 MILLIGRAM(S): at 06:48

## 2021-01-01 RX ADMIN — MEROPENEM 100 MILLIGRAM(S): 1 INJECTION INTRAVENOUS at 06:03

## 2021-01-01 RX ADMIN — Medication 0.5 MILLIGRAM(S): at 06:35

## 2021-01-01 RX ADMIN — REMDESIVIR 500 MILLIGRAM(S): 5 INJECTION INTRAVENOUS at 00:00

## 2021-01-01 RX ADMIN — ETOMIDATE 20 MILLIGRAM(S): 2 INJECTION INTRAVENOUS at 01:50

## 2021-01-01 RX ADMIN — SODIUM CHLORIDE 1000 MILLILITER(S): 9 INJECTION INTRAMUSCULAR; INTRAVENOUS; SUBCUTANEOUS at 18:09

## 2021-01-01 RX ADMIN — Medication 100 GRAM(S): at 06:03

## 2021-01-01 RX ADMIN — Medication 0.5 MILLIGRAM(S): at 12:04

## 2021-01-01 RX ADMIN — PROPOFOL 4.93 MICROGRAM(S)/KG/MIN: 10 INJECTION, EMULSION INTRAVENOUS at 08:57

## 2021-01-01 RX ADMIN — ENOXAPARIN SODIUM 80 MILLIGRAM(S): 100 INJECTION SUBCUTANEOUS at 17:39

## 2021-01-01 RX ADMIN — ENOXAPARIN SODIUM 80 MILLIGRAM(S): 100 INJECTION SUBCUTANEOUS at 17:40

## 2021-01-01 RX ADMIN — PANTOPRAZOLE SODIUM 40 MILLIGRAM(S): 20 TABLET, DELAYED RELEASE ORAL at 17:06

## 2021-01-01 RX ADMIN — Medication 81 MILLIGRAM(S): at 11:33

## 2021-01-01 RX ADMIN — DEXMEDETOMIDINE HYDROCHLORIDE IN 0.9% SODIUM CHLORIDE 6.16 MICROGRAM(S)/KG/HR: 4 INJECTION INTRAVENOUS at 18:37

## 2021-01-01 RX ADMIN — ENOXAPARIN SODIUM 80 MILLIGRAM(S): 100 INJECTION SUBCUTANEOUS at 10:10

## 2021-01-01 RX ADMIN — Medication 100 GRAM(S): at 17:38

## 2021-01-01 RX ADMIN — Medication 7.7 MICROGRAM(S)/KG/MIN: at 05:12

## 2021-01-01 RX ADMIN — MEROPENEM 100 MILLIGRAM(S): 1 INJECTION INTRAVENOUS at 07:03

## 2021-01-01 RX ADMIN — DEXMEDETOMIDINE HYDROCHLORIDE IN 0.9% SODIUM CHLORIDE 6.16 MICROGRAM(S)/KG/HR: 4 INJECTION INTRAVENOUS at 18:16

## 2021-01-01 RX ADMIN — Medication 100 GRAM(S): at 12:04

## 2021-01-01 RX ADMIN — DEXMEDETOMIDINE HYDROCHLORIDE IN 0.9% SODIUM CHLORIDE 6.16 MICROGRAM(S)/KG/HR: 4 INJECTION INTRAVENOUS at 04:34

## 2021-01-01 RX ADMIN — DEXMEDETOMIDINE HYDROCHLORIDE IN 0.9% SODIUM CHLORIDE 6.16 MICROGRAM(S)/KG/HR: 4 INJECTION INTRAVENOUS at 21:06

## 2021-01-01 RX ADMIN — DEXMEDETOMIDINE HYDROCHLORIDE IN 0.9% SODIUM CHLORIDE 6.16 MICROGRAM(S)/KG/HR: 4 INJECTION INTRAVENOUS at 00:47

## 2021-01-01 RX ADMIN — CHLORHEXIDINE GLUCONATE 15 MILLILITER(S): 213 SOLUTION TOPICAL at 06:04

## 2021-01-01 RX ADMIN — DEXMEDETOMIDINE HYDROCHLORIDE IN 0.9% SODIUM CHLORIDE 6.16 MICROGRAM(S)/KG/HR: 4 INJECTION INTRAVENOUS at 17:27

## 2021-01-01 RX ADMIN — PANTOPRAZOLE SODIUM 40 MILLIGRAM(S): 20 TABLET, DELAYED RELEASE ORAL at 11:53

## 2021-01-01 RX ADMIN — Medication 40 MILLIGRAM(S): at 14:48

## 2021-01-01 RX ADMIN — Medication 100 GRAM(S): at 13:57

## 2021-01-01 RX ADMIN — Medication 0.5 MILLIGRAM(S): at 05:16

## 2021-01-01 RX ADMIN — Medication 0.5 MILLIGRAM(S): at 05:50

## 2021-01-01 RX ADMIN — PHENYLEPHRINE HYDROCHLORIDE 15.4 MICROGRAM(S)/KG/MIN: 10 INJECTION INTRAVENOUS at 15:03

## 2021-01-01 RX ADMIN — Medication 6: at 13:17

## 2021-01-01 RX ADMIN — DEXMEDETOMIDINE HYDROCHLORIDE IN 0.9% SODIUM CHLORIDE 6.16 MICROGRAM(S)/KG/HR: 4 INJECTION INTRAVENOUS at 17:06

## 2021-01-01 RX ADMIN — Medication 40 MILLIGRAM(S): at 22:00

## 2021-01-01 RX ADMIN — PANTOPRAZOLE SODIUM 40 MILLIGRAM(S): 20 TABLET, DELAYED RELEASE ORAL at 12:13

## 2021-01-01 RX ADMIN — Medication 40 MILLIGRAM(S): at 23:00

## 2021-01-01 RX ADMIN — MORPHINE SULFATE 2 MILLIGRAM(S): 50 CAPSULE, EXTENDED RELEASE ORAL at 09:51

## 2021-01-01 RX ADMIN — Medication 110 MILLIGRAM(S): at 20:51

## 2021-01-01 RX ADMIN — CHLORHEXIDINE GLUCONATE 1 APPLICATION(S): 213 SOLUTION TOPICAL at 05:10

## 2021-01-01 RX ADMIN — Medication 100 MEQ/KG/HR: at 06:51

## 2021-01-01 RX ADMIN — Medication 81 MILLIGRAM(S): at 12:14

## 2021-01-01 RX ADMIN — DEXMEDETOMIDINE HYDROCHLORIDE IN 0.9% SODIUM CHLORIDE 6.16 MICROGRAM(S)/KG/HR: 4 INJECTION INTRAVENOUS at 20:04

## 2021-01-01 RX ADMIN — Medication 40 MILLIGRAM(S): at 14:24

## 2021-01-01 RX ADMIN — Medication 6 MILLIGRAM(S): at 05:08

## 2021-01-01 RX ADMIN — PANTOPRAZOLE SODIUM 40 MILLIGRAM(S): 20 TABLET, DELAYED RELEASE ORAL at 11:45

## 2021-01-01 RX ADMIN — PANTOPRAZOLE SODIUM 40 MILLIGRAM(S): 20 TABLET, DELAYED RELEASE ORAL at 11:28

## 2021-01-01 RX ADMIN — DEXMEDETOMIDINE HYDROCHLORIDE IN 0.9% SODIUM CHLORIDE 6.16 MICROGRAM(S)/KG/HR: 4 INJECTION INTRAVENOUS at 17:50

## 2021-01-01 RX ADMIN — Medication 650 MILLIGRAM(S): at 06:15

## 2021-01-01 RX ADMIN — Medication 40 MILLIGRAM(S): at 07:03

## 2021-01-01 RX ADMIN — Medication 6 MILLIGRAM(S): at 05:04

## 2021-01-01 RX ADMIN — Medication 40 MILLIGRAM(S): at 13:23

## 2021-01-01 RX ADMIN — MORPHINE SULFATE 1 MILLIGRAM(S): 50 CAPSULE, EXTENDED RELEASE ORAL at 23:30

## 2021-01-01 RX ADMIN — Medication 6 MILLIGRAM(S): at 06:39

## 2021-01-01 RX ADMIN — VASOPRESSIN 2.4 UNIT(S)/MIN: 20 INJECTION INTRAVENOUS at 10:10

## 2021-01-01 RX ADMIN — Medication 1 MILLIGRAM(S): at 00:11

## 2021-01-01 RX ADMIN — PROPOFOL 4.93 MICROGRAM(S)/KG/MIN: 10 INJECTION, EMULSION INTRAVENOUS at 18:10

## 2021-01-01 RX ADMIN — Medication 81 MILLIGRAM(S): at 11:53

## 2021-01-01 RX ADMIN — DEXMEDETOMIDINE HYDROCHLORIDE IN 0.9% SODIUM CHLORIDE 6.16 MICROGRAM(S)/KG/HR: 4 INJECTION INTRAVENOUS at 11:33

## 2021-01-01 RX ADMIN — Medication 150 MEQ/KG/HR: at 09:17

## 2021-01-01 RX ADMIN — Medication 40 MILLIGRAM(S): at 21:31

## 2021-01-01 RX ADMIN — PROPOFOL 50 MILLIGRAM(S): 10 INJECTION, EMULSION INTRAVENOUS at 01:50

## 2021-01-01 RX ADMIN — PANTOPRAZOLE SODIUM 40 MILLIGRAM(S): 20 TABLET, DELAYED RELEASE ORAL at 11:23

## 2021-01-01 RX ADMIN — LACTULOSE 20 GRAM(S): 10 SOLUTION ORAL at 12:13

## 2021-01-01 RX ADMIN — Medication 7.7 MICROGRAM(S)/KG/MIN: at 18:02

## 2021-01-01 RX ADMIN — Medication 40 MILLIGRAM(S): at 15:33

## 2021-01-01 RX ADMIN — Medication 2000 UNIT(S): at 00:57

## 2021-01-01 RX ADMIN — Medication 2: at 00:00

## 2021-01-01 RX ADMIN — ENOXAPARIN SODIUM 80 MILLIGRAM(S): 100 INJECTION SUBCUTANEOUS at 06:04

## 2021-01-01 RX ADMIN — Medication 2000 UNIT(S): at 11:21

## 2021-01-01 RX ADMIN — Medication 40 MILLIGRAM(S): at 13:14

## 2021-01-01 RX ADMIN — Medication 1 TABLET(S): at 11:33

## 2021-01-01 RX ADMIN — Medication 6 MILLIGRAM(S): at 05:06

## 2021-01-01 RX ADMIN — Medication 100 MEQ/KG/HR: at 10:58

## 2021-01-01 RX ADMIN — DEXMEDETOMIDINE HYDROCHLORIDE IN 0.9% SODIUM CHLORIDE 6.16 MICROGRAM(S)/KG/HR: 4 INJECTION INTRAVENOUS at 11:03

## 2021-01-01 RX ADMIN — DEXMEDETOMIDINE HYDROCHLORIDE IN 0.9% SODIUM CHLORIDE 6.16 MICROGRAM(S)/KG/HR: 4 INJECTION INTRAVENOUS at 00:31

## 2021-01-01 RX ADMIN — Medication 7.7 MICROGRAM(S)/KG/MIN: at 02:40

## 2021-01-01 RX ADMIN — ENOXAPARIN SODIUM 80 MILLIGRAM(S): 100 INJECTION SUBCUTANEOUS at 19:03

## 2021-01-01 RX ADMIN — ENOXAPARIN SODIUM 80 MILLIGRAM(S): 100 INJECTION SUBCUTANEOUS at 05:13

## 2021-01-01 RX ADMIN — Medication 50 MILLILITER(S): at 17:15

## 2021-01-01 RX ADMIN — Medication 4: at 07:06

## 2021-01-01 RX ADMIN — Medication 40 MILLIGRAM(S): at 05:02

## 2021-01-01 RX ADMIN — PANTOPRAZOLE SODIUM 40 MILLIGRAM(S): 20 TABLET, DELAYED RELEASE ORAL at 11:32

## 2021-01-01 RX ADMIN — MORPHINE SULFATE 2 MILLIGRAM(S): 50 CAPSULE, EXTENDED RELEASE ORAL at 07:25

## 2021-01-01 RX ADMIN — PANTOPRAZOLE SODIUM 40 MILLIGRAM(S): 20 TABLET, DELAYED RELEASE ORAL at 11:33

## 2021-01-01 RX ADMIN — Medication 1 TABLET(S): at 11:53

## 2021-01-01 RX ADMIN — Medication 81 MILLIGRAM(S): at 11:23

## 2021-01-01 RX ADMIN — FENTANYL CITRATE 4.11 MICROGRAM(S)/KG/HR: 50 INJECTION INTRAVENOUS at 04:24

## 2021-01-01 RX ADMIN — DEXMEDETOMIDINE HYDROCHLORIDE IN 0.9% SODIUM CHLORIDE 6.16 MICROGRAM(S)/KG/HR: 4 INJECTION INTRAVENOUS at 08:57

## 2021-01-01 RX ADMIN — Medication 1 MILLIGRAM(S): at 20:44

## 2021-01-01 RX ADMIN — MORPHINE SULFATE 2 MILLIGRAM(S): 50 CAPSULE, EXTENDED RELEASE ORAL at 20:47

## 2021-01-01 RX ADMIN — Medication 40 MILLIGRAM(S): at 06:51

## 2021-01-01 RX ADMIN — Medication 250 MILLIGRAM(S): at 10:25

## 2021-01-01 RX ADMIN — Medication 81 MILLIGRAM(S): at 11:28

## 2021-01-01 RX ADMIN — Medication 100 GRAM(S): at 17:08

## 2021-01-01 RX ADMIN — ENOXAPARIN SODIUM 80 MILLIGRAM(S): 100 INJECTION SUBCUTANEOUS at 17:18

## 2021-01-01 RX ADMIN — ENOXAPARIN SODIUM 80 MILLIGRAM(S): 100 INJECTION SUBCUTANEOUS at 18:16

## 2021-01-01 RX ADMIN — DEXMEDETOMIDINE HYDROCHLORIDE IN 0.9% SODIUM CHLORIDE 6.16 MICROGRAM(S)/KG/HR: 4 INJECTION INTRAVENOUS at 13:27

## 2021-01-01 RX ADMIN — CHLORHEXIDINE GLUCONATE 15 MILLILITER(S): 213 SOLUTION TOPICAL at 17:39

## 2021-01-01 RX ADMIN — Medication 2000 UNIT(S): at 11:23

## 2021-01-01 RX ADMIN — DEXMEDETOMIDINE HYDROCHLORIDE IN 0.9% SODIUM CHLORIDE 6.16 MICROGRAM(S)/KG/HR: 4 INJECTION INTRAVENOUS at 09:53

## 2021-01-01 RX ADMIN — FENTANYL CITRATE 4.11 MICROGRAM(S)/KG/HR: 50 INJECTION INTRAVENOUS at 00:11

## 2021-01-01 RX ADMIN — Medication 1 TABLET(S): at 12:14

## 2021-01-01 RX ADMIN — DEXMEDETOMIDINE HYDROCHLORIDE IN 0.9% SODIUM CHLORIDE 6.16 MICROGRAM(S)/KG/HR: 4 INJECTION INTRAVENOUS at 09:30

## 2021-01-01 RX ADMIN — Medication 40 MILLIGRAM(S): at 14:53

## 2021-01-01 RX ADMIN — Medication 1 MILLIGRAM(S): at 13:04

## 2021-01-01 RX ADMIN — Medication 40 MILLIGRAM(S): at 13:57

## 2021-01-01 RX ADMIN — DEXMEDETOMIDINE HYDROCHLORIDE IN 0.9% SODIUM CHLORIDE 6.16 MICROGRAM(S)/KG/HR: 4 INJECTION INTRAVENOUS at 04:43

## 2021-01-01 RX ADMIN — DEXMEDETOMIDINE HYDROCHLORIDE IN 0.9% SODIUM CHLORIDE 6.16 MICROGRAM(S)/KG/HR: 4 INJECTION INTRAVENOUS at 17:48

## 2021-01-01 RX ADMIN — Medication 40 MILLIGRAM(S): at 23:43

## 2021-01-01 RX ADMIN — MEROPENEM 100 MILLIGRAM(S): 1 INJECTION INTRAVENOUS at 23:00

## 2021-01-01 RX ADMIN — ENOXAPARIN SODIUM 80 MILLIGRAM(S): 100 INJECTION SUBCUTANEOUS at 17:48

## 2021-01-01 RX ADMIN — MORPHINE SULFATE 2 MILLIGRAM(S): 50 CAPSULE, EXTENDED RELEASE ORAL at 10:00

## 2021-01-01 RX ADMIN — CHLORHEXIDINE GLUCONATE 15 MILLILITER(S): 213 SOLUTION TOPICAL at 07:03

## 2021-01-01 RX ADMIN — ENOXAPARIN SODIUM 80 MILLIGRAM(S): 100 INJECTION SUBCUTANEOUS at 17:12

## 2021-01-01 RX ADMIN — ENOXAPARIN SODIUM 80 MILLIGRAM(S): 100 INJECTION SUBCUTANEOUS at 05:02

## 2021-01-01 RX ADMIN — Medication 5 MILLIGRAM(S): at 00:47

## 2021-01-01 RX ADMIN — Medication 400 MILLIGRAM(S): at 23:08

## 2021-01-01 RX ADMIN — DEXMEDETOMIDINE HYDROCHLORIDE IN 0.9% SODIUM CHLORIDE 6.16 MICROGRAM(S)/KG/HR: 4 INJECTION INTRAVENOUS at 01:58

## 2021-01-01 RX ADMIN — Medication 102 MILLIGRAM(S): at 18:09

## 2021-01-01 RX ADMIN — ENOXAPARIN SODIUM 40 MILLIGRAM(S): 100 INJECTION SUBCUTANEOUS at 13:19

## 2021-01-01 RX ADMIN — ENOXAPARIN SODIUM 80 MILLIGRAM(S): 100 INJECTION SUBCUTANEOUS at 06:48

## 2021-01-01 RX ADMIN — Medication 50 MILLILITER(S): at 11:52

## 2021-01-01 RX ADMIN — DEXMEDETOMIDINE HYDROCHLORIDE IN 0.9% SODIUM CHLORIDE 6.16 MICROGRAM(S)/KG/HR: 4 INJECTION INTRAVENOUS at 06:52

## 2021-01-01 RX ADMIN — Medication 5 MILLIGRAM(S): at 20:44

## 2021-01-01 RX ADMIN — ENOXAPARIN SODIUM 80 MILLIGRAM(S): 100 INJECTION SUBCUTANEOUS at 17:26

## 2021-01-01 RX ADMIN — MEROPENEM 100 MILLIGRAM(S): 1 INJECTION INTRAVENOUS at 18:23

## 2021-01-01 RX ADMIN — DEXMEDETOMIDINE HYDROCHLORIDE IN 0.9% SODIUM CHLORIDE 6.16 MICROGRAM(S)/KG/HR: 4 INJECTION INTRAVENOUS at 14:14

## 2021-01-01 RX ADMIN — REMDESIVIR 500 MILLIGRAM(S): 5 INJECTION INTRAVENOUS at 00:38

## 2021-01-01 RX ADMIN — Medication 650 MILLIGRAM(S): at 18:21

## 2021-01-01 RX ADMIN — DEXMEDETOMIDINE HYDROCHLORIDE IN 0.9% SODIUM CHLORIDE 6.16 MICROGRAM(S)/KG/HR: 4 INJECTION INTRAVENOUS at 05:02

## 2021-01-01 RX ADMIN — ENOXAPARIN SODIUM 80 MILLIGRAM(S): 100 INJECTION SUBCUTANEOUS at 18:37

## 2021-01-01 RX ADMIN — MEROPENEM 100 MILLIGRAM(S): 1 INJECTION INTRAVENOUS at 13:57

## 2021-01-01 RX ADMIN — TOCILIZUMAB 100 MILLIGRAM(S): 20 INJECTION, SOLUTION, CONCENTRATE INTRAVENOUS at 00:32

## 2021-01-01 RX ADMIN — Medication 81 MILLIGRAM(S): at 00:57

## 2021-01-01 RX ADMIN — REMDESIVIR 500 MILLIGRAM(S): 5 INJECTION INTRAVENOUS at 23:53

## 2021-01-01 RX ADMIN — Medication 1 MILLIGRAM(S): at 21:05

## 2021-01-01 RX ADMIN — DEXMEDETOMIDINE HYDROCHLORIDE IN 0.9% SODIUM CHLORIDE 6.16 MICROGRAM(S)/KG/HR: 4 INJECTION INTRAVENOUS at 06:48

## 2021-01-01 RX ADMIN — ENOXAPARIN SODIUM 80 MILLIGRAM(S): 100 INJECTION SUBCUTANEOUS at 07:02

## 2021-01-01 RX ADMIN — ENOXAPARIN SODIUM 80 MILLIGRAM(S): 100 INJECTION SUBCUTANEOUS at 05:50

## 2021-01-01 RX ADMIN — DEXMEDETOMIDINE HYDROCHLORIDE IN 0.9% SODIUM CHLORIDE 6.16 MICROGRAM(S)/KG/HR: 4 INJECTION INTRAVENOUS at 02:47

## 2021-01-01 RX ADMIN — Medication 81 MILLIGRAM(S): at 12:04

## 2021-01-01 RX ADMIN — Medication 7.7 MICROGRAM(S)/KG/MIN: at 12:59

## 2021-01-01 RX ADMIN — Medication 7.7 MICROGRAM(S)/KG/MIN: at 06:04

## 2021-01-01 RX ADMIN — MEROPENEM 100 MILLIGRAM(S): 1 INJECTION INTRAVENOUS at 13:23

## 2021-01-01 RX ADMIN — PANTOPRAZOLE SODIUM 40 MILLIGRAM(S): 20 TABLET, DELAYED RELEASE ORAL at 12:14

## 2021-01-01 RX ADMIN — DEXMEDETOMIDINE HYDROCHLORIDE IN 0.9% SODIUM CHLORIDE 6.16 MICROGRAM(S)/KG/HR: 4 INJECTION INTRAVENOUS at 11:45

## 2021-01-01 RX ADMIN — Medication 650 MILLIGRAM(S): at 11:28

## 2021-01-01 RX ADMIN — PHENYLEPHRINE HYDROCHLORIDE 15.4 MICROGRAM(S)/KG/MIN: 10 INJECTION INTRAVENOUS at 22:36

## 2021-01-01 RX ADMIN — CISATRACURIUM BESYLATE 14.8 MICROGRAM(S)/KG/MIN: 2 INJECTION INTRAVENOUS at 18:04

## 2021-01-01 RX ADMIN — Medication 650 MILLIGRAM(S): at 07:58

## 2021-01-01 RX ADMIN — Medication 100 MILLIEQUIVALENT(S): at 04:00

## 2021-01-01 RX ADMIN — Medication 2000 UNIT(S): at 12:26

## 2021-01-01 RX ADMIN — Medication 0.5 MILLIGRAM(S): at 06:34

## 2021-01-01 RX ADMIN — PANTOPRAZOLE SODIUM 40 MILLIGRAM(S): 20 TABLET, DELAYED RELEASE ORAL at 12:26

## 2021-01-01 RX ADMIN — REMDESIVIR 500 MILLIGRAM(S): 5 INJECTION INTRAVENOUS at 00:53

## 2021-01-01 RX ADMIN — ENOXAPARIN SODIUM 80 MILLIGRAM(S): 100 INJECTION SUBCUTANEOUS at 05:57

## 2021-01-01 RX ADMIN — DEXMEDETOMIDINE HYDROCHLORIDE IN 0.9% SODIUM CHLORIDE 6.16 MICROGRAM(S)/KG/HR: 4 INJECTION INTRAVENOUS at 20:33

## 2021-01-01 RX ADMIN — DEXMEDETOMIDINE HYDROCHLORIDE IN 0.9% SODIUM CHLORIDE 6.16 MICROGRAM(S)/KG/HR: 4 INJECTION INTRAVENOUS at 00:46

## 2021-01-01 RX ADMIN — ENOXAPARIN SODIUM 80 MILLIGRAM(S): 100 INJECTION SUBCUTANEOUS at 17:28

## 2021-01-01 RX ADMIN — ENOXAPARIN SODIUM 80 MILLIGRAM(S): 100 INJECTION SUBCUTANEOUS at 06:35

## 2021-01-01 RX ADMIN — ENOXAPARIN SODIUM 80 MILLIGRAM(S): 100 INJECTION SUBCUTANEOUS at 05:03

## 2021-01-01 RX ADMIN — Medication 650 MILLIGRAM(S): at 05:08

## 2021-01-01 RX ADMIN — SODIUM CHLORIDE 999 MILLILITER(S): 9 INJECTION, SOLUTION INTRAVENOUS at 10:00

## 2021-01-01 RX ADMIN — PANTOPRAZOLE SODIUM 40 MILLIGRAM(S): 20 TABLET, DELAYED RELEASE ORAL at 17:22

## 2021-04-02 PROBLEM — K86.2 PANCREATIC CYST: Status: ACTIVE | Noted: 2021-01-01

## 2021-04-02 PROBLEM — K86.9 LESION OF PANCREAS: Noted: 2019-04-05

## 2021-04-02 PROBLEM — E55.9 VITAMIN D DEFICIENCY: Status: ACTIVE | Noted: 2019-02-25

## 2021-04-02 PROBLEM — F43.9 STRESS: Status: RESOLVED | Noted: 2019-02-15 | Resolved: 2021-01-01

## 2021-04-02 PROBLEM — Z00.00 ENCOUNTER FOR PREVENTIVE HEALTH EXAMINATION: Status: ACTIVE | Noted: 2019-01-16

## 2021-04-04 PROBLEM — R73.01 IMPAIRED FASTING GLUCOSE: Status: ACTIVE | Noted: 2021-01-01

## 2021-04-07 PROBLEM — R97.20 ELEVATED PSA: Status: ACTIVE | Noted: 2021-01-01

## 2021-04-15 PROBLEM — R94.31 ABNORMAL EKG: Status: ACTIVE | Noted: 2019-02-15

## 2021-04-15 PROBLEM — R53.81 MALAISE AND FATIGUE: Status: ACTIVE | Noted: 2021-01-01

## 2021-04-15 PROBLEM — R50.9 FEVER AND CHILLS: Status: ACTIVE | Noted: 2021-01-01

## 2021-04-15 PROBLEM — Z12.11 COLON CANCER SCREENING: Status: RESOLVED | Noted: 2019-02-15 | Resolved: 2021-01-01

## 2021-04-15 PROBLEM — I77.810 AORTIC ROOT DILATATION: Status: ACTIVE | Noted: 2019-02-15

## 2021-04-15 PROBLEM — R03.0 WHITE COAT SYNDROME WITHOUT HYPERTENSION: Status: ACTIVE | Noted: 2019-02-15

## 2021-04-15 PROBLEM — Z20.822 CLOSE EXPOSURE TO COVID-19 VIRUS: Status: ACTIVE | Noted: 2021-01-01

## 2021-04-15 PROBLEM — E78.5 HYPERLIPIDEMIA: Status: ACTIVE | Noted: 2019-02-15

## 2021-04-15 PROBLEM — Z20.822 SUSPECTED COVID-19 VIRUS INFECTION: Status: ACTIVE | Noted: 2021-01-01

## 2021-04-15 PROBLEM — K21.9 GERD (GASTROESOPHAGEAL REFLUX DISEASE): Status: ACTIVE | Noted: 2019-02-15

## 2021-04-15 PROBLEM — R51.9 FREQUENT HEADACHES: Status: ACTIVE | Noted: 2021-01-01

## 2021-04-15 PROBLEM — R05 DRY COUGH: Status: ACTIVE | Noted: 2021-01-01

## 2021-04-15 PROBLEM — Z12.5 PROSTATE CANCER SCREENING: Status: RESOLVED | Noted: 2020-03-16 | Resolved: 2021-01-01

## 2021-04-16 PROBLEM — U07.1 COVID-19: Status: RESOLVED | Noted: 2021-01-01 | Resolved: 2021-01-01

## 2021-04-16 PROBLEM — U07.1 COVID-19: Status: ACTIVE | Noted: 2021-01-01

## 2021-04-16 NOTE — ED ADULT NURSE NOTE - NSIMPLEMENTINTERV_GEN_ALL_ED
Implemented All Universal Safety Interventions:  [place] to call system. Call bell, personal items and telephone within reach. Instruct patient to call for assistance. Room bathroom lighting operational. Non-slip footwear when patient is off stretcher. Physically safe environment: no spills, clutter or unnecessary equipment. Stretcher in lowest position, wheels locked, appropriate side rails in place.

## 2021-04-16 NOTE — H&P ADULT - HISTORY OF PRESENT ILLNESS
65M no pmh presents for worsening shortness of breath. Patient states that has been having fevers, chills and headache for the last 10 days. Both of his sons who are in school recently were quarantining from school due to covid. He states the last few days he's been feeling especially weak and more short of breath. His PCP tried to get him to come in for antibody infusion, but when arrived found to be hypoxic to 60's and ill appearing and was brought to ER.     In ER, pt initially on 100%NRB, but now on 100%HFNC. He received dexamethasone 10 mg iv, doxycycline and 1L ns. Pt states he feels markedly better.

## 2021-04-16 NOTE — H&P ADULT - NSHPLABSRESULTS_GEN_ALL_CORE
14.2   11.33 )-----------( 269      ( 16 Apr 2021 18:00 )             43.9       04-16    133<L>  |  93<L>  |  25.0<H>  ----------------------------<  136<H>  4.4   |  27.0  |  1.18    Ca    9.3      16 Apr 2021 18:00    TPro  8.0  /  Alb  3.7  /  TBili  0.8  /  DBili  x   /  AST  64<H>  /  ALT  43<H>  /  AlkPhos  68  04-16        PT/INR - ( 16 Apr 2021 18:00 )   PT: 16.2 sec;   INR: 1.42 ratio       PTT - ( 16 Apr 2021 18:00 )  PTT:26.9 sec  Lactate Trend    CARDIAC MARKERS ( 16 Apr 2021 18:00 )  x     / 0.02 ng/mL / x     / x     / x        CAPILLARY BLOOD GLUCOSE    RADIOLOGY, EKG & ADDITIONAL TESTS: Reviewed.    cxr- b/l opacities, L hemidiaphragm elevation

## 2021-04-16 NOTE — H&P ADULT - ASSESSMENT
65M no pmh presents for worsening shortness of breath found to have acute hypoxic respiratory failure 2/2 covid19 pna.       #Acute hypoxic respiratory failure 2/2 covid19 pneumonia  -oxygen improved on HFNC at 100%  -pt advised to prone at least hourly  -will continue with dexamethasone 6 mg daily  -start remdesivir taper  -D-dimer normal when corrected for age, unlikely PE, continue to trend    #dvt ppx- lovenox 40 sq qd

## 2021-04-16 NOTE — ED PROVIDER NOTE - OBJECTIVE STATEMENT
65y M w/ no significant PMH, presenting for shortness of breath.  Pt developed fever and mild cough 8 days ago, and tested positive for COVID 3 days ago.  He was scheduled to go for monoclonal antibody infusion today, but developed worsening shortness of breath.  Pt satting 62% on RA on arrival, improved to 92% on 15 L NRB.  Denies chest pain, vomiting, diarrhea.  Has not been vaccinated for COVID.  Reports +sick contacts.

## 2021-04-16 NOTE — ED PROVIDER NOTE - NS ED ROS FT
Constitutional: +fever  Eyes: no vision changes  ENT: no nasal congestion, no sore throat  CV: no chest pain  Resp: +cough, +shortness of breath  GI: no abdominal pain, no vomiting, no diarrhea  : no dysuria  MSK: no joint pain  Skin: no rash  Neuro: no headache, no weakness, no paresthesias

## 2021-04-16 NOTE — H&P ADULT - NSHPPHYSICALEXAM_GEN_ALL_CORE
Vital Signs Last 24 Hrs  T(C): 37.3 (16 Apr 2021 19:00), Max: 38.4 (16 Apr 2021 16:16)  T(F): 99.1 (16 Apr 2021 19:00), Max: 101.1 (16 Apr 2021 16:16)  HR: 84 (16 Apr 2021 20:58) (84 - 133)  BP: 118/76 (16 Apr 2021 19:00) (118/76 - 203/90)  BP(mean): --  RR: 22 (16 Apr 2021 19:00) (22 - 36)  SpO2: 97% (16 Apr 2021 20:58) (93% - 98%)    GENERAL: NAD,   HEENT:  Atraumatic, Normocephalic, MMM, no oropharyngeal lesions  EYES: EOMI, PERRLA, conjunctiva and sclera clear  NECK: Supple, No JVD, no throat tenderness.  CHEST/LUNG: Clear to auscultation bilaterally; absent bS at left base  HEART: Regular rate and rhythm; No murmurs, rubs, or gallops  ABDOMEN: Soft, Nontender, Nondistended; Bowel sounds present  EXTREMITIES:  2+ Peripheral Pulses, No clubbing, cyanosis, or edema  PSYCH: AAOx3, normal affect  NEUROLOGY: moves all extremities spontaneously. no sensory deficits  SKIN: No rashes or lesions

## 2021-04-16 NOTE — ED ADULT NURSE NOTE - OBJECTIVE STATEMENT
Pt. presents alert and oriented x 4 to ED complaining of SOB and hypoxia. Pt. states he is COVID-19+ and presented to outpatient infusion tent for monoclonal antibody treatment. At site, pt appeared in distress and cyanotic and was sent to ED for evaluation. Pt. was found to be 62% on room air, tachypneic, febrile, tachycardic, and hypertensive. Pt. remained awake, alert, and oriented. Improvement in spO2 to 92% with 15Lmin NRB, pt to be placed on high flow NC. Pt. is calm and cooperative with staff.

## 2021-04-16 NOTE — ED PROVIDER NOTE - CLINICAL SUMMARY MEDICAL DECISION MAKING FREE TEXT BOX
65y M w/ no significant PMH, presenting for hypoxia in setting of recent COVID diagnosis.  Pt initially satting 62% on RA; improved to 92% on NRB.  Placed on HFNC with improvement.  Will give decadron, fluids.  CXR, EKG, labs, admit.

## 2021-04-16 NOTE — ED PROVIDER NOTE - CRITICAL CARE ATTENDING CONTRIBUTION TO CARE
The patient seen immediately secondary to critical conditions and multiple re-visits required to stabilize the patient

## 2021-04-16 NOTE — ED PROVIDER NOTE - ATTENDING CONTRIBUTION TO CARE
The patient seen immediately secondary to critical conditions and required multiple re-visits to stabilize the patient

## 2021-04-16 NOTE — ED PROVIDER NOTE - PHYSICAL EXAMINATION
Constitutional: Awake, alert, in mild distress  Eyes: no scleral icterus  HENT: normocephalic, atraumatic, moist oral mucosa  Neck: supple  CV: +tachycardic, regular rhythm, no murmur  Pulm: +tachypneic, +bibasilar crackles  Abdomen: soft, non-tender, non-distended  Extremities: no edema, no deformity  Skin: no rash, no jaundice  Neuro: AAOx3, moving all extremities equally

## 2021-04-16 NOTE — ED PROVIDER NOTE - NS ED ATTENDING STATEMENT MOD
I have personally provided the amount of critical care time documented below excluding time spent on separate procedures. I have personally provided the amount of critical care time documented below concurrently with the resident/fellow.  This time excludes time spent on separate procedures and time spent teaching. I have reviewed the resident’s / fellow’s documentation and I agree with the history, exam, and assessment and plan of care.

## 2021-04-16 NOTE — ED ADULT NURSE NOTE - COVID-19 RESULT
[FreeTextEntry1] : This note was written by Jackie Ruvalcaba on 11/30/2020 acting as scribe for Sherry Cardenas M.D.\par \par I, Sherry Cardenas have read and attest that all the information, medical decision making and discharge instructions within are true and accurate.  POSITIVE

## 2021-04-16 NOTE — ED ADULT NURSE NOTE - BREATH SOUNDS, MLM
Detail Level: Simple Additional Notes: If lesion is not healed at next visit, may perform biopsy. Additional Notes: Part of mole came off patient given reassurance Clear

## 2021-04-16 NOTE — ED ADULT TRIAGE NOTE - CHIEF COMPLAINT QUOTE
COVID + arrives to ED complaining of SOB x 3 days.  Pt. arrives dusky and tachypneic.  O2 noted to be 62% on room air; pt. placed on non rebreather in triage and O2 improved to 92%.  Pt. brought to isolation area

## 2021-04-17 NOTE — PROGRESS NOTE ADULT - SUBJECTIVE AND OBJECTIVE BOX
BAILEE MIN  ----------------------------------------  The patient was seen at bedside. Patient with COVD-19 pneumonia Reports some improvement in the dyspnea.    HPI:  65M who was previously diagnosed with COVID-19 with generalized weakness and dyspnea who was referred to [facility] when he presented for the monoclonal antibody infusion and was noted to have hypoxia. Suppelemtal oxygen was initiated for respiratory failure and remdesivir and dexamethasone were initiated.    Vital Signs Last 24 Hrs  T(C): 36.4 (2021 08:56), Max: 38.4 (2021 16:16)  T(F): 97.6 (2021 08:56), Max: 101.1 (2021 16:16)  HR: 75 (2021 08:56) (71 - 133)  BP: 133/85 (2021 08:56) (118/76 - 203/90)  BP(mean): --  RR: 19 (2021 08:56) (19 - 36)  SpO2: 100% (2021 08:56) (88% - 100%)    PHYSICAL EXAMINATION:  ----------------------------------------  General appearance: No acute distress, Awake, Alert  HEENT: Normocephalic, Atraumatic, Conjunctiva clear, EOMI  Neck: Supple, No JVD, No tenderness  Lungs: Breath sound equal bilaterally, No wheezes, No rales  Cardiovascular: S1S2, Regular rhythm  Abdomen: Soft, Nontender, Nondistended, No guarding/rebound, Positive bowel sounds  Extremities: No clubbing, No cyanosis, No edema, No calf tenderness  Neuro: Strength equal bilaterally, No tremors  Psychiatric: Appropriate mood, Normal affect    LABORATORY STUDIES:  ----------------------------------------             13.0   5.52  )-----------( 243      ( 2021 10:33 )             40.6     04-17    135  |  96<L>  |  25.0<H>  ----------------------------<  134<H>  4.3   |  25.0  |  0.80    Ca    9.0      2021 10:33  Phos  3.9     04-  Mg     2.7         TPro  8.0  /  Alb  3.7  /  TBili  0.8  /  DBili  x   /  AST  64<H>  /  ALT  43<H>  /  AlkPhos  68  04-16    LIVER FUNCTIONS - ( 2021 18:00 )  Alb: 3.7 g/dL / Pro: 8.0 g/dL / ALK PHOS: 68 U/L / ALT: 43 U/L / AST: 64 U/L / GGT: x           PT/INR - ( 2021 18:00 )   PT: 16.2 sec;   INR: 1.42 ratio    PTT - ( 2021 18:00 )  PTT:26.9 sec    CARDIAC MARKERS ( 2021 10:33 )  x     / <0.01 ng/mL / x     / x     / x      CARDIAC MARKERS ( 2021 18:00 )  x     / 0.02 ng/mL / x     / x     / x        Urinalysis Basic - ( 2021 03:59 )  Color: Yellow / Appearance: Clear / S.015 / pH: x  Gluc: x / Ketone: Negative  / Bili: Negative / Urobili: Negative mg/dL   Blood: x / Protein: 100 mg/dL / Nitrite: Negative   Leuk Esterase: Negative / RBC: 0-2 /HPF / WBC 0-2   Sq Epi: x / Non Sq Epi: Occasional / Bacteria: Few    MEDICATIONS  (STANDING):  aspirin enteric coated 81 milliGRAM(s) Oral daily  cholecalciferol 2000 Unit(s) Oral daily  dexAMETHasone  Injectable 6 milliGRAM(s) IV Push daily  enoxaparin Injectable 40 milliGRAM(s) SubCutaneous daily  remdesivir  IVPB   IV Intermittent     MEDICATIONS  (PRN):  acetaminophen   Tablet .. 650 milliGRAM(s) Oral every 6 hours PRN Temp greater or equal to 38C (100.4F), Mild Pain (1 - 3), Moderate Pain (4 - 6)      ASSESSMENT / PLAN:  ----------------------------------------  65M with COVID-19 pneumonia.    Acute hypoxic respiratory failure / COVID-19 pneumonia - On supplemental oxygen with high flow nasal oxygen. On remdesivir and dexamethasone. Prone positioning as tolerates.    Hyponatremia - Mild, resolved.    Transaminitis - Suspect secondary to underlying infection.

## 2021-04-18 NOTE — PROGRESS NOTE ADULT - SUBJECTIVE AND OBJECTIVE BOX
HEALTH ISSUES - PROBLEM Dx:    COVID PNA, Hypoxia    INTERVAL HPI/ OVERNIGHT EVENTS:    lying in bed with HOB elevated  no dyspnea  saturating 89-90% on HFNC 100%  surly countenance  saying I am doing everything I can  when asked specifically - states he hasnt used the incentive spirometry, has not proned at all    REVIEW OF SYSTEMS:    as above    Vital Signs Last 24 Hrs  T(C): 36.9 (2021 08:12), Max: 36.9 (2021 08:12)  T(F): 98.5 (2021 08:12), Max: 98.5 (2021 08:12)  HR: 79 (2021 10:12) (79 - 91)  BP: 133/82 (2021 08:12) (133/82 - 147/91)  BP(mean): --  RR: 18 (2021 08:12) (18 - 20)  SpO2: 95% (2021 10:12) (76% - 100%)    PHYSICAL EXAM-  General appearance: anxious, appears frustrated, on HFNC FiO2 100% and saturating 89-90%  HEENT: Normocephalic, Atraumatic, Conjunctiva clear, EOMI  Neck: Supple, No JVD, No tenderness  Lungs: Breath sound equal bilaterally, No wheezes, No rales  Cardiovascular: S1S2, Regular rhythm  Abdomen: Soft, Nontender, Nondistended, No guarding/rebound, Positive bowel sounds  Extremities: No clubbing, No cyanosis, No edema, No calf tenderness  Neuro: Strength equal bilaterally, No tremors    MEDICATIONS  (STANDING):  aspirin enteric coated 81 milliGRAM(s) Oral daily  cholecalciferol 2000 Unit(s) Oral daily  dexAMETHasone  Injectable 6 milliGRAM(s) IV Push daily  enoxaparin Injectable 40 milliGRAM(s) SubCutaneous daily  remdesivir  IVPB 100 milliGRAM(s) IV Intermittent every 24 hours  remdesivir  IVPB   IV Intermittent     MEDICATIONS  (PRN):  acetaminophen   Tablet .. 650 milliGRAM(s) Oral every 6 hours PRN Temp greater or equal to 38C (100.4F), Mild Pain (1 - 3), Moderate Pain (4 - 6)  melatonin 5 milliGRAM(s) Oral at bedtime PRN Insomnia      LABS:                        13.0   5.52  )-----------( 243      ( 2021 10:33 )             40.6     04-17    135  |  96<L>  |  25.0<H>  ----------------------------<  134<H>  4.3   |  25.0  |  0.80    Ca    9.0      2021 10:33  Phos  3.9     04-17  Mg     2.7     04-17    TPro  8.0  /  Alb  3.7  /  TBili  0.8  /  DBili  x   /  AST  64<H>  /  ALT  43<H>  /  AlkPhos  68  04-16    PT/INR - ( 2021 18:00 )   PT: 16.2 sec;   INR: 1.42 ratio         PTT - ( 2021 18:00 )  PTT:26.9 sec  Urinalysis Basic - ( 2021 03:59 )    Color: Yellow / Appearance: Clear / S.015 / pH: x  Gluc: x / Ketone: Negative  / Bili: Negative / Urobili: Negative mg/dL   Blood: x / Protein: 100 mg/dL / Nitrite: Negative   Leuk Esterase: Negative / RBC: 0-2 /HPF / WBC 0-2   Sq Epi: x / Non Sq Epi: Occasional / Bacteria: Few

## 2021-04-18 NOTE — PROGRESS NOTE ADULT - ASSESSMENT
65M with COVID-19 pneumonia, hypoxia    Acute hypoxic respiratory failure  On supplemental oxygen with high flow nasal oxygen 100% saturating 89- 90%  On Remdesivir and dexamethasone.   Proned patient and chest PT provided with improvement in saturation to 100%   Encouraged Incentive Spirometry  Nebs, Tylenol  recommended anxiolytics, pt refused    COVID-19 pneumonia  as above    Hyponatremia -   Mild, resolved.    Transaminitis -   Suspect secondary to underlying infection.  Monitor on Remdesivir    Lovenox

## 2021-04-18 NOTE — CHART NOTE - NSCHARTNOTEFT_GEN_A_CORE
RN called to report patient having difficulty sleeping and requesting a sleep aide.  RX 5mg of Melatonin.

## 2021-04-19 NOTE — CONSULT NOTE ADULT - ASSESSMENT
65y  Male with no PMH comes to the ER with worsening shortness of breath. Patient states that has been having fevers, chills and headache for the last 8-10 days. Both of his sons who are in school recently were quarantining from school due to covid. He states the last few days he's been feeling especially weak and more short of breath. His PCP tried to get him to come in for antibody infusion, but when arrived found to be hypoxic to 60's and ill appearing and was brought to ER. In the ER patient was initially on 100% NRB and followed by 100% HFNC. Started on Remdesivir and Dexamethasone. Now on BIPAP.      Acute Hypoxic respiratory failure  COVID-19 infection  B/L Pneumonia   Suspected PE  cough  fevers  shortness of breath      - COVID 19 PCR positive   - Blood cultures ordered   - CXR reporting clear lungs   - Continue supportive care measures  - continue to trend inflammatory markers.   - trend CBC with diff, CMP,  CRP, Ferritin,  procalcitonin q 48hours  - Avoid antibiotics unless there is a concern for a bacterial infection  - May consider vitamin C, thiamine and zinc (note lack of evidence to support benefit with COVID 19)  - Discussed Remdesivir with the patient.   - Continue Remdesivir 200mg IV x1 followed by 100mg IV daily   - D/W MICU need to consider tPA given rising D-Dimer   - STAT TTE ordered to check for Rt Heart strain   - Will consider Actemra w/in the next 24 hours pending repeat Procalcitonin   - Dexamethasone 6mg Daily   - Patient needs to quarantine  for 14 days after discharge   - follow up all outstanding cultures  - Trend Fever  - Trend Leukocytosis      Will follow      D/W Dr Nixon and MICU team

## 2021-04-19 NOTE — PROGRESS NOTE ADULT - ASSESSMENT
65M with COVID-19 pneumonia, hypoxia    Acute hypoxic respiratory failure  On supplemental oxygen with BiPAP FiO2 100%  On Remdesivir and dexamethasone.   ID consulted for other agents   Encouraged Incentive Spirometry  Nebs, Tylenol  recommended anxiolytics, pt refused  MICU consulted    Elevated D dimer, although not very high at admission  given increasing O2 needs, will treat with full dose Lovenox  risks and benefits explained to patient  Check D dimer Q 3-4 days  Cannot go down for CTA currently  [facility] ordered    COVID-19 pneumonia  as above    s/p Hyponatremia -   Mild, resolved.    Transaminitis -   Suspect secondary to underlying infection.  Monitor on Remdesivir    Lovenox    start gentle hydration in AM if still on continuous BiPAP tomorrow  sister updated.

## 2021-04-19 NOTE — CONSULT NOTE ADULT - SUBJECTIVE AND OBJECTIVE BOX
Pt is a 65 YOM with no PMH with COVID--19 PNA, was symptomatic for 10 days prior to coming to hospital.  He has had escalating O2 requirements over the last few days.   Overnight he became more hypoxic on high flow +NC and this am was placed on NIPPV.  Upon my arrival he is awake and alert.  He appears and states that he is very anxious.  He does feel better on bipap though.  He denies CP, N/V.  At this time his 02 sat is 96% on bipap of 10/5 at 100%, he is tachypneic at times.   He is hemodynamically stable.  At this time pt does not require ICU evaluation but does have the potential to get worse and if he does please recall and we can reconsider.   Pt is a 65 YOM with no PMH with COVID--19 PNA, was symptomatic for 10 days prior to coming to hospital.  He has had escalating O2 requirements over the last few days.   Overnight he became more hypoxic on high flow +NC and this am was placed on NIPPV.  Upon my arrival he is awake and alert.  He appears and states that he is very anxious.  He does feel better on bipap though.  He denies CP, N/V.  At this time his 02 sat is 96% on bipap of 10/5 at 100%, he is tachypneic at times.   He is hemodynamically stable.  At this time pt does not require ICU evaluation but does have the potential to get worse and if he does please recall and we can reconsider.    Patient is a 65y old  Male who presents with a chief complaint of covid19, hypoxia (19 Apr 2021 09:00)    PAST MEDICAL & SURGICAL HISTORY:  No pertinent past medical history    S/P shoulder surgery        Review of Systems:  CONSTITUTIONAL: pos fatigue  EYES: No eye pain, visual disturbances, or discharge  ENMT:  No difficulty hearing, tinnitus, vertigo; No sinus or throat pain  NECK: No pain or stiffness  RESPIRATORY: pos shortness of breath  CARDIOVASCULAR: No chest pain, palpitations, dizziness, or leg swelling  GASTROINTESTINAL: No abdominal or epigastric pain. No nausea, vomiting, or hematemesis; No diarrhea or constipation. No melena or hematochezia.  GENITOURINARY: No dysuria, frequency, hematuria, or incontinence  NEUROLOGICAL: No headaches, memory loss, loss of strength, numbness, or tremors  SKIN: No itching, burning, rashes, or lesions   MUSCULOSKELETAL: No joint pain or swelling; No muscle, back, or extremity pain  PSYCHIATRIC: pos anxiety      Medications:  remdesivir  IVPB 100 milliGRAM(s) IV Intermittent every 24 hours  remdesivir  IVPB   IV Intermittent       ALBUTerol    90 MICROgram(s) HFA Inhaler 2 Puff(s) Inhalation every 6 hours PRN    acetaminophen   Tablet .. 650 milliGRAM(s) Oral every 6 hours PRN  melatonin 5 milliGRAM(s) Oral at bedtime PRN      aspirin enteric coated 81 milliGRAM(s) Oral daily  enoxaparin Injectable 40 milliGRAM(s) SubCutaneous daily    pantoprazole    Tablet 40 milliGRAM(s) Oral before breakfast      dexAMETHasone  Injectable 6 milliGRAM(s) IV Push daily    cholecalciferol 2000 Unit(s) Oral daily      ICU Vital Signs Last 24 Hrs  T(C): 36.8 (19 Apr 2021 08:54), Max: 38.8 (19 Apr 2021 04:50)  T(F): 98.2 (19 Apr 2021 08:54), Max: 101.9 (19 Apr 2021 04:50)  HR: 82 (19 Apr 2021 09:17) (70 - 101)  BP: 125/79 (19 Apr 2021 07:53) (125/79 - 158/100)  BP(mean): --  ABP: --  ABP(mean): --  RR: 20 (19 Apr 2021 09:17) (20 - 22)  SpO2: 95% (19 Apr 2021 09:17) (79% - 100%)      LABS:                        13.0   5.52  )-----------( 243      ( 17 Apr 2021 10:33 )             40.6     04-19    133<L>  |  95<L>  |  27.0<H>  ----------------------------<  131<H>  4.3   |  26.0  |  0.86    Ca    8.5<L>      19 Apr 2021 07:41  Phos  3.9     04-17  Mg     2.7     04-17    TPro  6.5<L>  /  Alb  3.1<L>  /  TBili  0.7  /  DBili  x   /  AST  80<H>  /  ALT  108<H>  /  AlkPhos  64  04-19      CARDIAC MARKERS ( 17 Apr 2021 10:33 )  x     / <0.01 ng/mL / x     / x     / x          CAPILLARY BLOOD GLUCOSE      CULTURES:  Rapid RVP Result: NotDetec (04-16 @ 18:27)      Physical Examination:    General: Anxious on NIPPV    HEENT: Pupils equal, reactive to light.  Symmetric.    PULM: diffuse crackles throughout bilaterally, no significant sputum production    CVS: sinus tachy, no murmurs, rubs, or gallops    ABD: Soft, nondistended, nontender, normoactive bowel sounds, no masses    EXT: No edema, nontender    SKIN: Warm and well perfused, no rashes noted.    RADIOLOGY: images reviewed    CRITICAL CARE TIME SPENT:

## 2021-04-19 NOTE — CONSULT NOTE ADULT - SUBJECTIVE AND OBJECTIVE BOX
[facility]  INFECTIOUS DISEASES AND INTERNAL MEDICINE at [place]  =======================================================  Jose G Avilez MD  Diplomates American Board of Internal Medicine and Infectious Diseases  Tel: [phone]      Fax: [phone]  =======================================================      [id number]  BAILEE GONZALEZRADHANATHANIEL    CC: Patient is a 65y old  Male who presents with a chief complaint of covid19, hypoxia (19 Apr 2021 09:00)      65y  Male with no PMH comes to the ER with worsening shortness of breath. Patient states that has been having fevers, chills and headache for the last 8-10 days. Both of his sons who are in school recently were quarantining from school due to covid. He states the last few days he's been feeling especially weak and more short of breath. His PCP tried to get him to come in for antibody infusion, but when arrived found to be hypoxic to 60's and ill appearing and was brought to ER. In the ER patient was initially on 100% NRB and followed by 100% HFNC. Started on Remdesivir and Dexamethasone. Now on BIPAP. ID input requested.       Past Medical & Surgical Hx:  No pertinent past medical history  S/P shoulder surgery      Social Hx:  Denies smoking      FAMILY HISTORY:  Patient unable to recall any family history of medical problems at this time.       Allergies  No Known Allergies       REVIEW OF SYSTEMS:  CONSTITUTIONAL:  + Fever + chills  HEENT:  No diplopia or blurred vision.  No earache, sore throat or runny nose.  CARDIOVASCULAR:  No pressure, squeezing, strangling, tightness, heaviness or aching about the chest, neck, axilla or epigastrium.  RESPIRATORY:  + cough, + shortness of breath  GASTROINTESTINAL:  No nausea, vomiting or diarrhea.  GENITOURINARY:  No dysuria, frequency or urgency.   MUSCULOSKELETAL:  no joint aches, no muscle pain  SKIN:  No change in skin, hair or nails.  NEUROLOGIC:  No Headaches, seizures   PSYCHIATRIC:  No disorder of thought or mood.  ENDOCRINE:  No heat or cold intolerance  HEMATOLOGICAL:  No easy bruising or bleeding.       Physical Exam:  GEN: Moderate respiratory distress   HEENT: normocephalic and atraumatic. EOMI. PERRL.  Anicteric  NECK: Supple.   LUNGS: Coarse BS B/L  HEART: Regular rate and rhythm   ABDOMEN: Soft, nontender, and nondistended.  Positive bowel sounds.    : No CVA tenderness  EXTREMITIES: Without any edema.  MSK: No joint swelling  NEUROLOGIC: No Focal Deficits  PSYCHIATRIC: Appropriate affect .  SKIN: No Rash      Vitals:  T(F): 98.2 (19 Apr 2021 08:54), Max: 101.9 (19 Apr 2021 04:50)  HR: 82 (19 Apr 2021 09:17)  BP: 125/79 (19 Apr 2021 07:53)  RR: 20 (19 Apr 2021 09:17)  SpO2: 95% (19 Apr 2021 09:17) (79% - 100%)  temp max in last 48H T(F): , Max: 101.9 (04-19-21 @ 04:50)    Current Antibiotics:  remdesivir  IVPB 100 milliGRAM(s) IV Intermittent every 24 hours  remdesivir  IVPB   IV Intermittent     Other medications:  aspirin enteric coated 81 milliGRAM(s) Oral daily  cholecalciferol 2000 Unit(s) Oral daily  dexAMETHasone  Injectable 6 milliGRAM(s) IV Push daily  enoxaparin Injectable 80 milliGRAM(s) SubCutaneous two times a day  pantoprazole  Injectable 40 milliGRAM(s) IV Push daily      04-19    133<L>  |  95<L>  |  27.0<H>  ----------------------------<  131<H>  4.3   |  26.0  |  0.86    Ca    8.5<L>      19 Apr 2021 07:41    TPro  6.5<L>  /  Alb  3.1<L>  /  TBili  0.7  /  DBili  x   /  AST  80<H>  /  ALT  108<H>  /  AlkPhos  64  04-19      WBC Count: 5.52 K/uL (04-17-21 @ 10:33)  WBC Count: 11.33 K/uL (04-16-21 @ 18:00)    Creatinine, Serum: 0.86 mg/dL (04-19-21 @ 07:41)  Creatinine, Serum: 0.77 mg/dL (04-18-21 @ 13:35)  Creatinine, Serum: 0.80 mg/dL (04-17-21 @ 10:33)  Creatinine, Serum: 1.18 mg/dL (04-16-21 @ 18:00)    C-Reactive Protein, Serum: >422 mg/L (04-16-21 @ 18:00)    Ferritin, Serum: 829 ng/mL (04-16-21 @ 18:00)    Procalcitonin, Serum: 5.93 ng/mL (04-16-21 @ 18:00)     Rapid RVP Result: NotDetec (04-16-21 @ 18:27)  SARS-CoV-2: Detected (04-16-21 @ 18:27)    D-Dimer Assay, Quantitative (04.19.21 @ 11:33)    D-Dimer Assay, Quantitative: 66374 ng/mL DDU      < from: Xray Chest 1 View-PORTABLE IMMEDIATE (04.16.21 @ 19:26) >  EXAM:  XR CHEST PORTABLE IMMED 1V                          PROCEDURE DATE:  04/16/2021      INTERPRETATION:  XR CHEST IMMEDIATE    Single AP view    HISTORY:  Shortness of Breath, Cough,  Fever    Comparison: none.    The cardiac silhouette iswithin normal limits. The lungs are clear. No pleural abnormality.    IMPRESSION: Elevated left diaphragm and associated left base atelectasis    < end of copied text >

## 2021-04-20 NOTE — PROGRESS NOTE ADULT - ASSESSMENT
65y  Male with no PMH comes to the ER with worsening shortness of breath. Patient states that has been having fevers, chills and headache for the last 8-10 days. Both of his sons who are in school recently were quarantining from school due to covid. He states the last few days he's been feeling especially weak and more short of breath. His PCP tried to get him to come in for antibody infusion, but when arrived found to be hypoxic to 60's and ill appearing and was brought to ER. In the ER patient was initially on 100% NRB and followed by 100% HFNC. Started on Remdesivir and Dexamethasone. Now on BIPAP.      Acute Hypoxic respiratory failure  COVID-19 infection  B/L Pneumonia   Suspected PE  cough  fevers  shortness of breath      - COVID 19 PCR positive   - Blood cultures ordered   - CXR reporting clear lungs   - Continue supportive care measures  - continue to trend inflammatory markers.   - trend CBC with diff, CMP,  CRP, Ferritin,  procalcitonin q 48hours  - Avoid antibiotics unless there is a concern for a bacterial infection  - May consider vitamin C, thiamine and zinc (note lack of evidence to support benefit with COVID 19)  - Discussed Remdesivir with the patient.   - Continue Remdesivir 200mg IV x1 followed by 100mg IV daily   - s/p Actemra 4/20  - Dexamethasone 6mg Daily   - follow up all outstanding cultures  - Trend Fever  - Trend Leukocytosis      Will follow      D/W MICU team

## 2021-04-20 NOTE — DIETITIAN INITIAL EVALUATION ADULT. - PERTINENT MEDS FT
MEDICATIONS  (STANDING):  aspirin enteric coated 81 milliGRAM(s) Oral daily  cholecalciferol 2000 Unit(s) Oral daily  dexAMETHasone  Injectable 6 milliGRAM(s) IV Push daily  dexMEDEtomidine Infusion 0.3 MICROgram(s)/kG/Hr (6.16 mL/Hr) IV Continuous <Continuous>  enoxaparin Injectable 80 milliGRAM(s) SubCutaneous two times a day  pantoprazole  Injectable 40 milliGRAM(s) IV Push daily  remdesivir  IVPB 100 milliGRAM(s) IV Intermittent every 24 hours  remdesivir  IVPB   IV Intermittent     MEDICATIONS  (PRN):  acetaminophen   Tablet .. 650 milliGRAM(s) Oral every 6 hours PRN Temp greater or equal to 38C (100.4F), Mild Pain (1 - 3), Moderate Pain (4 - 6)  ALBUTerol    90 MICROgram(s) HFA Inhaler 2 Puff(s) Inhalation every 6 hours PRN Shortness of Breath and/or Wheezing  LORazepam   Injectable 0.5 milliGRAM(s) IV Push three times a day PRN Anxiety  melatonin 5 milliGRAM(s) Oral at bedtime PRN Insomnia

## 2021-04-20 NOTE — PROGRESS NOTE ADULT - SUBJECTIVE AND OBJECTIVE BOX
[facility]  INFECTIOUS DISEASES AND INTERNAL MEDICINE at [place]  =======================================================  Jose G Avilez MD  Diplomates American Board of Internal Medicine and Infectious Diseases  Tel: [phone]      Fax: [phone]  =======================================================    BAILEE COPELAND [id number]    Follow up: COVID 19     On BIPAP  s/p Actemra 4/20      Allergies:  No Known Allergies      REVIEW OF SYSTEMS:  CONSTITUTIONAL:  No Fever + chills  HEENT:  No diplopia or blurred vision.  No earache, sore throat or runny nose.  CARDIOVASCULAR:  No pressure, squeezing, strangling, tightness, heaviness or aching about the chest, neck, axilla or epigastrium.  RESPIRATORY:  + cough, + shortness of breath  GASTROINTESTINAL:  No nausea, vomiting or diarrhea.  GENITOURINARY:  No dysuria, frequency or urgency.   MUSCULOSKELETAL:  no joint aches, no muscle pain  SKIN:  No change in skin, hair or nails.  NEUROLOGIC:  No Headaches, seizures   PSYCHIATRIC:  No disorder of thought or mood.  ENDOCRINE:  No heat or cold intolerance  HEMATOLOGICAL:  No easy bruising or bleeding.       Physical Exam:  GEN: Moderate respiratory distress   HEENT: normocephalic and atraumatic. EOMI. PERRL.  Anicteric  NECK: Supple.   LUNGS: Coarse BS B/L  HEART: Regular rate and rhythm   ABDOMEN: Soft, nontender, and nondistended.  Positive bowel sounds.    : No CVA tenderness  EXTREMITIES: Without any edema.  MSK: No joint swelling  NEUROLOGIC: No Focal Deficits  PSYCHIATRIC: Appropriate affect .  SKIN: No Rash      Vitals:  T(F): 100.2 (20 Apr 2021 07:30), Max: 102.2 (19 Apr 2021 22:04)  HR: 73 (20 Apr 2021 08:10)  BP: 113/68 (20 Apr 2021 08:01)  RR: 26 (20 Apr 2021 08:01)  SpO2: 97% (20 Apr 2021 08:10) (93% - 98%)  temp max in last 48H T(F): , Max: 102.2 (04-19-21 @ 22:04)      Current Antibiotics:  remdesivir  IVPB 100 milliGRAM(s) IV Intermittent every 24 hours  remdesivir  IVPB   IV Intermittent     Other medications:  aspirin enteric coated 81 milliGRAM(s) Oral daily  cholecalciferol 2000 Unit(s) Oral daily  dexAMETHasone  Injectable 6 milliGRAM(s) IV Push daily  dexMEDEtomidine Infusion 0.3 MICROgram(s)/kG/Hr IV Continuous <Continuous>  enoxaparin Injectable 80 milliGRAM(s) SubCutaneous two times a day  pantoprazole  Injectable 40 milliGRAM(s) IV Push daily                 13.3   6.57  )-----------( 244      ( 20 Apr 2021 05:43 )             41.3     04-20    137  |  97<L>  |  31.0<H>  ----------------------------<  127<H>  4.8   |  27.0  |  0.85    Ca    8.8      20 Apr 2021 05:41    TPro  6.6  /  Alb  3.0<L>  /  TBili  0.7  /  DBili  x   /  AST  68<H>  /  ALT  92<H>  /  AlkPhos  72  04-20        WBC Count: 6.57 K/uL (04-20-21 @ 05:43)  WBC Count: 5.52 K/uL (04-17-21 @ 10:33)  WBC Count: 11.33 K/uL (04-16-21 @ 18:00)    Creatinine, Serum: 0.85 mg/dL (04-20-21 @ 05:41)  Creatinine, Serum: 0.86 mg/dL (04-19-21 @ 07:41)  Creatinine, Serum: 0.77 mg/dL (04-18-21 @ 13:35)  Creatinine, Serum: 0.80 mg/dL (04-17-21 @ 10:33)  Creatinine, Serum: 1.18 mg/dL (04-16-21 @ 18:00)    C-Reactive Protein, Serum: 211 mg/L (04-20-21 @ 05:41)  C-Reactive Protein, Serum: >422 mg/L (04-16-21 @ 18:00)    Ferritin, Serum: 1075 ng/mL (04-20-21 @ 05:41)  Ferritin, Serum: 829 ng/mL (04-16-21 @ 18:00)    Procalcitonin, Serum: 0.67 ng/mL (04-19-21 @ 18:07)  Procalcitonin, Serum: 5.93 ng/mL (04-16-21 @ 18:00)     Rapid RVP Result: NotDetec (04-16-21 @ 18:27)  SARS-CoV-2: Detected (04-16-21 @ 18:27)

## 2021-04-20 NOTE — PROGRESS NOTE ADULT - SUBJECTIVE AND OBJECTIVE BOX
Patient is a 65y old  Male who presents with a chief complaint of covid19, hypoxia (19 Apr 2021 17:09)      BRIEF HOSPITAL COURSE:   65M with no significant PMHx who presented with COVID PNA as outpt for 10days. Hypoxic on presentation. Initially admitted to medical floor. Escalating O2--> HFNC--> NIPPV. Worsening tachypnea and agitation intermittently. Upgraded to ICU. Placed on precedex infusion.     Events last 24 hours: afebrile, hemodynamics stable, remains on NIPPV, intermittent desats improved with positioning, remains very anxious, currently resting on precedex.    PAST MEDICAL & SURGICAL HISTORY:  No pertinent past medical history    S/P shoulder surgery          Hosp day #4d        Vital signs / Reviewed and Physical Exam Performed where pertinent and urgently required    Lab / Radiology  studies / ABG / Meds -  reviewed and interpreted into the assessment and treatment plan.    I&O's Summary    18 Apr 2021 07:01  -  19 Apr 2021 07:00  --------------------------------------------------------  IN: 0 mL / OUT: 225 mL / NET: -225 mL    19 Apr 2021 07:01  -  20 Apr 2021 01:40  --------------------------------------------------------  IN: 47.4 mL / OUT: 400 mL / NET: -352.6 mL        Impression:  1. acute hypoxemic respiratory failure  2. ARDS  3. COVID-19 Viral PNA    Plan:  Neuro - Light sedation for comfort with precedex, melatonin for sleep hygiene    CV -  hemodynamics stable           POCUS with grossly normal RV function, no signs of dilatation     Pulm -  cont NIPPV support as tolerates              will escalate EPAP upward for alveolar recruitment              encouraging self proning or partial prone positioning as tolerates [id number]              actively titrating FiO2 for sats>88%              remains very high risk for intubation     GI -  PPI  NPO while on NIPPV, aspiration precautions    Renal - Cr stable, strict I/Os, Even to negative fluid balance as tolerated by hemodynamics and renal fx, trend BMP     Heme -  Full dose AC empirically given significant elevation of D-dimer, POCUS without dilated RV, check LE duplex, no signs of active bleeding     ID - afebrile, WBC normal yesterday, cont to monitor off abx, Remdesivir and Decadron for full courses, received Toci x 1 last night as per ID, RVP negative, COVID+ only, Abx initiation based on discussion with ID in conjunction with clinical          features and culture data    Endo -  Aggressive glycemic control to limit FS glucose to < 180mg/dl, BS stable, check A1c.      COVID 19 specific considerations and therapeutic  options based on the available and rapidly changing literature      35  minutes of critical care time spent in the management of this critically ill COVID-19 patient with continuous assessments and interventions based on the interpretation of multiple databases.

## 2021-04-20 NOTE — DIETITIAN INITIAL EVALUATION ADULT. - OTHER INFO
65 year old male admitted on 4/16 with COVID19 viral pneumonia, now with worsening acute respiratory failure requiring NIV, DVT.  Transferred to MICU service on 4/19. Pt on COVID isolation precautions. Currently on a regular diet, however, NPO due to BiPAP. No edema noted. Skin intact. RD to follow up.

## 2021-04-20 NOTE — CHART NOTE - NSCHARTNOTEFT_GEN_A_CORE
Spoke with sister Oriana, gave update on patient's condition.  She asked that I call her sister Bushra as well which I did but no answer, left message.

## 2021-04-20 NOTE — PROGRESS NOTE ADULT - ASSESSMENT
EXAM:   Alert, follows commands, asking for water  on bipap, maintaining spo2 in 90s, comfortable with good tidal volumes  bilat air entry  reg rhythm abd soft  trace edema    RADIOLOGY REVIEWED:  cxr - bilat airspace and interstitial opacities     IMPRESSION/ASSESSMENT & PLAN:   64 yo male admitted on 4/16 with COVID19 viral pneumonia, now with worsening acute respiratory failure requiring NIV, DVT.  Transferred to MICU service on 4/19.    COVID19  - s/p Actemra on 4/19  - on steroids  - NIV alternating with high flow    DVT and possible PE  - full dose anticoagulation with lovenox  - repeat D-dimer tomorrow, if still rising then will switch to heparin    Overall prognosis guarded.  Will update family.

## 2021-04-20 NOTE — PROGRESS NOTE ADULT - SUBJECTIVE AND OBJECTIVE BOX
On Admission  04-16-21 (4d)  HPI:  65M no pmh presents for worsening shortness of breath. Patient states that has been having fevers, chills and headache for the last 10 days. Both of his sons who are in school recently were quarantining from school due to covid. He states the last few days he's been feeling especially weak and more short of breath. His PCP tried to get him to come in for antibody infusion, but when arrived found to be hypoxic to 60's and ill appearing and was brought to ER.     In ER, pt initially on 100%NRB, but now on 100%HFNC. He received dexamethasone 10 mg iv, doxycycline and 1L ns. Pt states he feels markedly better. (16 Apr 2021 23:10)    PAST MEDICAL & SURGICAL HISTORY:  No pertinent past medical history    S/P shoulder surgery        Antimicrobial:  remdesivir  IVPB 100 milliGRAM(s) IV Intermittent every 24 hours  remdesivir  IVPB   IV Intermittent     Cardiovascular:    Pulmonary:  ALBUTerol    90 MICROgram(s) HFA Inhaler 2 Puff(s) Inhalation every 6 hours PRN    Hematalogic:  aspirin enteric coated 81 milliGRAM(s) Oral daily  enoxaparin Injectable 80 milliGRAM(s) SubCutaneous two times a day    Other:  acetaminophen   Tablet .. 650 milliGRAM(s) Oral every 6 hours PRN  cholecalciferol 2000 Unit(s) Oral daily  dexAMETHasone  Injectable 6 milliGRAM(s) IV Push daily  dexMEDEtomidine Infusion 0.3 MICROgram(s)/kG/Hr IV Continuous <Continuous>  LORazepam   Injectable 0.5 milliGRAM(s) IV Push three times a day PRN  melatonin 5 milliGRAM(s) Oral at bedtime PRN  pantoprazole  Injectable 40 milliGRAM(s) IV Push daily      Drug Dosing Weight  Height (cm): 172.7 (18 Apr 2021 14:45)  Weight (kg): 82.1 (18 Apr 2021 14:45)  BMI (kg/m2): 27.5 (18 Apr 2021 14:45)  BSA (m2): 1.96 (18 Apr 2021 14:45)    T(C): 37.9 (04-20-21 @ 07:30), Max: 39 (04-19-21 @ 22:04)  HR: 64 (04-20-21 @ 12:02)  BP: 106/65 (04-20-21 @ 12:02)  BP(mean): 74 (04-20-21 @ 12:02)  ABP: --  ABP(mean): --  RR: 23 (04-20-21 @ 12:02)  SpO2: 93% (04-20-21 @ 12:02)    ABG - ( 19 Apr 2021 11:00 )  pH, Arterial: 7.45  pH, Blood: x     /  pCO2: 40    /  pO2: 68    / HCO3: 28    / Base Excess: 3.5   /  SaO2: 93                    04-19 @ 07:01  -  04-20 @ 07:00  --------------------------------------------------------  IN: 319.5 mL / OUT: 400 mL / NET: -80.5 mL              LABS:  CBC Full  -  ( 20 Apr 2021 05:43 )  WBC Count : 6.57 K/uL  RBC Count : 4.71 M/uL  Hemoglobin : 13.3 g/dL  Hematocrit : 41.3 %  Platelet Count - Automated : 244 K/uL  Mean Cell Volume : 87.7 fl  Mean Cell Hemoglobin : 28.2 pg  Mean Cell Hemoglobin Concentration : 32.2 gm/dL  Auto Neutrophil # : x  Auto Lymphocyte # : x  Auto Monocyte # : x  Auto Eosinophil # : x  Auto Basophil # : x  Auto Neutrophil % : x  Auto Lymphocyte % : x  Auto Monocyte % : x  Auto Eosinophil % : x  Auto Basophil % : x    04-20    137  |  97<L>  |  31.0<H>  ----------------------------<  127<H>  4.8   |  27.0  |  0.85    Ca    8.8      20 Apr 2021 05:41    TPro  6.6  /  Alb  3.0<L>  /  TBili  0.7  /  DBili  x   /  AST  68<H>  /  ALT  92<H>  /  AlkPhos  72  04-20          ____________________________________________________________________________________________________

## 2021-04-21 NOTE — PROGRESS NOTE ADULT - SUBJECTIVE AND OBJECTIVE BOX
On Admission  04-16-21 (5d)  HPI:  65M no pmh presents for worsening shortness of breath. Patient states that has been having fevers, chills and headache for the last 10 days. Both of his sons who are in school recently were quarantining from school due to covid. He states the last few days he's been feeling especially weak and more short of breath. His PCP tried to get him to come in for antibody infusion, but when arrived found to be hypoxic to 60's and ill appearing and was brought to ER.     In ER, pt initially on 100%NRB, but now on 100%HFNC. He received dexamethasone 10 mg iv, doxycycline and 1L ns. Pt states he feels markedly better. (16 Apr 2021 23:10)    PAST MEDICAL & SURGICAL HISTORY:  No pertinent past medical history    S/P shoulder surgery        Antimicrobial:    Cardiovascular:    Pulmonary:  ALBUTerol    90 MICROgram(s) HFA Inhaler 2 Puff(s) Inhalation every 6 hours PRN    Hematalogic:  aspirin enteric coated 81 milliGRAM(s) Oral daily  enoxaparin Injectable 80 milliGRAM(s) SubCutaneous two times a day    Other:  acetaminophen   Tablet .. 650 milliGRAM(s) Oral every 6 hours PRN  cholecalciferol 2000 Unit(s) Oral daily  dexAMETHasone  Injectable 6 milliGRAM(s) IV Push daily  dexMEDEtomidine Infusion 0.3 MICROgram(s)/kG/Hr IV Continuous <Continuous>  LORazepam   Injectable 0.5 milliGRAM(s) IV Push three times a day PRN  melatonin 5 milliGRAM(s) Oral at bedtime PRN  pantoprazole  Injectable 40 milliGRAM(s) IV Push daily      Drug Dosing Weight  Height (cm): 172.7 (18 Apr 2021 14:45)  Weight (kg): 82.1 (18 Apr 2021 14:45)  BMI (kg/m2): 27.5 (18 Apr 2021 14:45)  BSA (m2): 1.96 (18 Apr 2021 14:45)    T(C): 38.9 (04-21-21 @ 08:11), Max: 38.9 (04-21-21 @ 08:11)  HR: 88 (04-21-21 @ 08:26)  BP: 112/76 (04-21-21 @ 06:00)  BP(mean): 83 (04-21-21 @ 06:00)  ABP: --  ABP(mean): --  RR: 34 (04-21-21 @ 06:00)  SpO2: 93% (04-21-21 @ 08:26)          04-20 @ 07:01  -  04-21 @ 07:00  --------------------------------------------------------  IN: 596.8 mL / OUT: 1000 mL / NET: -403.2 mL              LABS:  CBC Full  -  ( 20 Apr 2021 05:43 )  WBC Count : 6.57 K/uL  RBC Count : 4.71 M/uL  Hemoglobin : 13.3 g/dL  Hematocrit : 41.3 %  Platelet Count - Automated : 244 K/uL  Mean Cell Volume : 87.7 fl  Mean Cell Hemoglobin : 28.2 pg  Mean Cell Hemoglobin Concentration : 32.2 gm/dL  Auto Neutrophil # : x  Auto Lymphocyte # : x  Auto Monocyte # : x  Auto Eosinophil # : x  Auto Basophil # : x  Auto Neutrophil % : x  Auto Lymphocyte % : x  Auto Monocyte % : x  Auto Eosinophil % : x  Auto Basophil % : x    04-20    137  |  97<L>  |  31.0<H>  ----------------------------<  127<H>  4.8   |  27.0  |  0.85    Ca    8.8      20 Apr 2021 05:41    TPro  6.6  /  Alb  3.0<L>  /  TBili  0.7  /  DBili  x   /  AST  68<H>  /  ALT  92<H>  /  AlkPhos  72  04-20          ____________________________________________________________________________________________________

## 2021-04-21 NOTE — PROGRESS NOTE ADULT - TIME BILLING
coordinating care with multiple team members including ICU nurses, resp therapists, other physicians and specialists.
counseling family on treatment plan and prognosis, as well as explaining risks/harms of trying unproven therapies

## 2021-04-21 NOTE — PROGRESS NOTE ADULT - SUBJECTIVE AND OBJECTIVE BOX
[facility]  INFECTIOUS DISEASES AND INTERNAL MEDICINE at [place]  =======================================================  Jose G Avilez MD  Diplomates American Board of Internal Medicine and Infectious Diseases  Tel: [phone]      Fax: [phone]  =======================================================    [place] [id number]    Follow up: COVID 19     On HFNC  s/p Actemra 4/20      Allergies:  No Known Allergies      REVIEW OF SYSTEMS:  CONSTITUTIONAL:  No Fever or chills  HEENT:  No diplopia or blurred vision.  No earache, sore throat or runny nose.  CARDIOVASCULAR:  No pressure, squeezing, strangling, tightness, heaviness or aching about the chest, neck, axilla or epigastrium.  RESPIRATORY:  + cough, + shortness of breath  GASTROINTESTINAL:  No nausea, vomiting or diarrhea.  GENITOURINARY:  No dysuria, frequency or urgency.   MUSCULOSKELETAL:  no joint aches, no muscle pain  SKIN:  No change in skin, hair or nails.  NEUROLOGIC:  No Headaches, seizures   PSYCHIATRIC:  No disorder of thought or mood.  ENDOCRINE:  No heat or cold intolerance  HEMATOLOGICAL:  No easy bruising or bleeding.       Physical Exam:  GEN: Mild respiratory distress   HEENT: normocephalic and atraumatic. EOMI. PERRL.  Anicteric  NECK: Supple.   LUNGS: Coarse BS B/L  HEART: Regular rate and rhythm   ABDOMEN: Soft, nontender, and nondistended.  Positive bowel sounds.    : No CVA tenderness  EXTREMITIES: Without any edema.  MSK: No joint swelling  NEUROLOGIC: No Focal Deficits  PSYCHIATRIC: Appropriate affect .  SKIN: No Rash      Vitals:  T(F): 99.8 (21 Apr 2021 11:42), Max: 102 (21 Apr 2021 08:11)  HR: 88 (21 Apr 2021 08:26)  BP: 112/76 (21 Apr 2021 06:00)  RR: 34 (21 Apr 2021 06:00)  SpO2: 93% (21 Apr 2021 08:26) (91% - 95%)  temp max in last 48H T(F): , Max: 102.2 (04-19-21 @ 22:04)      Current Antibiotics:    Other medications:  aspirin enteric coated 81 milliGRAM(s) Oral daily  cholecalciferol 2000 Unit(s) Oral daily  dexAMETHasone  Injectable 6 milliGRAM(s) IV Push daily  dexMEDEtomidine Infusion 0.3 MICROgram(s)/kG/Hr IV Continuous <Continuous>  enoxaparin Injectable 80 milliGRAM(s) SubCutaneous two times a day  pantoprazole  Injectable 40 milliGRAM(s) IV Push daily                            13.9   6.41  )-----------( 284      ( 21 Apr 2021 11:00 )             42.4     04-21    136  |  98  |  37.0<H>  ----------------------------<  149<H>  4.9   |  24.0  |  0.78    Ca    8.8      21 Apr 2021 11:00  Phos  3.9     04-21  Mg     2.4     04-21    TPro  6.8  /  Alb  2.9<L>  /  TBili  0.7  /  DBili  x   /  AST  40<H>  /  ALT  63<H>  /  AlkPhos  85  04-21        WBC Count: 6.41 K/uL (04-21-21 @ 11:00)  WBC Count: 6.57 K/uL (04-20-21 @ 05:43)  WBC Count: 5.52 K/uL (04-17-21 @ 10:33)  WBC Count: 11.33 K/uL (04-16-21 @ 18:00)    Creatinine, Serum: 0.78 mg/dL (04-21-21 @ 11:00)  Creatinine, Serum: 0.85 mg/dL (04-20-21 @ 05:41)  Creatinine, Serum: 0.86 mg/dL (04-19-21 @ 07:41)  Creatinine, Serum: 0.77 mg/dL (04-18-21 @ 13:35)  Creatinine, Serum: 0.80 mg/dL (04-17-21 @ 10:33)  Creatinine, Serum: 1.18 mg/dL (04-16-21 @ 18:00)    C-Reactive Protein, Serum: 174 mg/L (04-21-21 @ 11:00)  C-Reactive Protein, Serum: 211 mg/L (04-20-21 @ 05:41)  C-Reactive Protein, Serum: >422 mg/L (04-16-21 @ 18:00)    Ferritin, Serum: 1328 ng/mL (04-21-21 @ 11:00)  Ferritin, Serum: 1075 ng/mL (04-20-21 @ 05:41)  Ferritin, Serum: 829 ng/mL (04-16-21 @ 18:00)    Procalcitonin, Serum: 0.67 ng/mL (04-19-21 @ 18:07)  Procalcitonin, Serum: 5.93 ng/mL (04-16-21 @ 18:00)     Rapid RVP Result: NotDetec (04-16-21 @ 18:27)  SARS-CoV-2: Detected (04-16-21 @ 18:27)

## 2021-04-21 NOTE — CONSULT NOTE ADULT - SUBJECTIVE AND OBJECTIVE BOX
[place] [facility]                                                               Office:  [address] [address]                                                              Telephone: [phone]. Fax:[phone]                                                                        CARDIOLOGY CONSULTATION NOTE                                                                                             Consult requested by:  Dr. Sanchez  Reason for Consultation: COVID PNA  History obtained by: Patient and medical record   obtained: No    Chief complaint:    Patient is a 65y old  Male who presents with a chief complaint of covid19, hypoxia (21 Apr 2021 11:01)        HPI:  65M no pmh presents for worsening shortness of breath. Patient states that has been having fevers, chills and headache for the last 10 days. Both of his sons who are in school recently were quarantining from school due to covid. He states the last few days he's been feeling especially weak and more short of breath. His PCP tried to get him to come in for antibody infusion, but when arrived found to be hypoxic to 60's and ill appearing and was brought to ER.     In ER, pt initially on 100%NRB, but now on 100%HFNC. He received dexamethasone 10 mg iv, doxycycline and 1L ns. Pt states he feels markedly better. (16 Apr 2021 23:10)    Above Appreciated  Cardiology consult requested per patient family. Pt denies previous cardiology diagnosis and ischemic workup. Pt states he was able to participate in strenuous activity without anginal symptoms.       REVIEW OF SYMPTOMS:     CONSTITUTIONAL: No fever, weight loss, or fatigue  ENMT:  No difficulty hearing, tinnitus, vertigo; No sinus or throat pain  NECK: No pain or stiffness  CARDIOVASCULAR: See HPI  RESPIRATORY: No Dyspnea on exertion, Shortness of breath, cough, wheezing  : No dysuria, no hematuria   GI: No dark color stool, no melena, no diarrhea, no constipation, no abdominal pain   NEURO: No headache, no dizziness, no slurred speech   MUSCULOSKELETAL: No joint pain or swelling; No muscle, back, or extremity pain  PSYCH: No agitation, no anxiety.    ALL OTHER REVIEW OF SYSTEMS ARE NEGATIVE.    ALLERGIES: Allergies    No Known Allergies    Intolerances      PAST MEDICAL HISTORY  No pertinent past medical history    PAST SURGICAL HISTORY  S/P shoulder surgery    FAMILY HISTORY:  No pertinent family history in first degree relatives    SOCIAL HISTORY:   CIGARETTES:   Denies  ALCOHOL: occasional beer  DRUGS: Denies      CURRENT MEDICATIONS:  dexMEDEtomidine Infusion  pantoprazole  Injectable  aspirin enteric coated  cholecalciferol  dexAMETHasone  Injectable  enoxaparin Injectable        HOME MEDICATIONS:    Aspirin Enteric Coated 81 mg oral delayed release tablet: 1 tab(s) orally once a day (16 Apr 2021 23:47)  PriLOSEC 20 mg oral delayed release capsule: 1 cap(s) orally once a day (16 Apr 2021 23:47)  Vitamin D3 1000 intl units (25 mcg) oral capsule: 1 tab(s) orally once a day (16 Apr 2021 23:47)      Vital Signs Last 24 Hrs  T(C): 36.4 (21 Apr 2021 16:05), Max: 38.9 (21 Apr 2021 08:11)  T(F): 97.5 (21 Apr 2021 16:05), Max: 102 (21 Apr 2021 08:11)  HR: 71 (21 Apr 2021 16:00) (58 - 88)  BP: 107/70 (21 Apr 2021 16:00) (86/59 - 125/80)  BP(mean): 80 (21 Apr 2021 16:00) (65 - 88)  RR: 21 (21 Apr 2021 16:00) (18 - 34)  SpO2: 93% (21 Apr 2021 16:00) (90% - 97%)      PHYSICAL EXAM:  Constitutional: Comfortable . No acute distress.   HEENT: Atraumatic and normocephalic , neck is supple . no JVD. No carotid bruit. PEERL   CNS: A&Ox3. No focal deficits. EOMI.    Lymph Nodes: Cervical : Not palpable.  Respiratory: RLL diminished  Cardiovascular: S1S2 RRR. No murmur/rubs or gallop.  Gastrointestinal: Soft non-tender and non distended . +Bowel sounds. negative Diaz's sign   Extremities: No edema.   Psychiatric: Calm . no agitation.  Skin: No skin rash/ulcers visualized to face, hands or feet     Intake and output:   04-20 @ 07:01  -  04-21 @ 07:00  --------------------------------------------------------  IN: 596.8 mL / OUT: 1000 mL / NET: -403.2 mL    04-21 @ 07:01  -  04-21 @ 17:10  --------------------------------------------------------  IN: 610 mL / OUT: 200 mL / NET: 410 mL        LABS:                        13.9   6.41  )-----------( 284      ( 21 Apr 2021 11:00 )             42.4     04-21    136  |  98  |  37.0<H>  ----------------------------<  149<H>  4.9   |  24.0  |  0.78    Ca    8.8      21 Apr 2021 11:00  Phos  3.9     04-21  Mg     2.4     04-21    TPro  6.8  /  Alb  2.9<L>  /  TBili  0.7  /  DBili  x   /  AST  40<H>  /  ALT  63<H>  /  AlkPhos  85  04-21      ;p-BNP=  PTT - ( 21 Apr 2021 11:00 )  PTT:31.5 sec      INTERPRETATION OF TELEMETRY: NSR HR @ 60-70s  ECG: ST HR @ 104, poor R wave progression, low voltage in inferior leads     RADIOLOGY & ADDITIONAL STUDIES:      X-ray: < from: Xray Chest 1 View- PORTABLE-Routine (04.21.21 @ 06:56) >  IMPRESSION:  Moderate bilateral alveolar/interstitial perihilar infiltrates, grossly unchanged.    Mild pulmonary venous congestion, improved    CT scan: pending    US:  < from: US Duplex Venous Lower Ext Complete, Bilateral (04.20.21 @ 08:15) >  IMPRESSION:  Deep venous thrombosis in the proximal right femoral vein and distal left posterior tibial vein.

## 2021-04-21 NOTE — PROGRESS NOTE ADULT - SUBJECTIVE AND OBJECTIVE BOX
Patient is a 65y old  Male who presents with a chief complaint of covid19, hypoxia (20 Apr 2021 14:25)      BRIEF HOSPITAL COURSE:   65M with no significant PMHx who presented with COVID PNA as outpt for 10days. Hypoxic on presentation. Initially admitted to medical floor. Escalating O2--> HFNC--> NIPPV. Worsening tachypnea and agitation intermittently. Upgraded to ICU. Placed on precedex infusion.     Events last 24 hours: afebrile, hemodynamics stable, remains on NIPPV 100%, less anxious overnight with precedex       PAST MEDICAL & SURGICAL HISTORY:  No pertinent past medical history    S/P shoulder surgery      Hosp day #5d      Vital signs / Reviewed and Physical Exam Performed where pertinent and urgently required    Lab / Radiology  studies / ABG / Meds -  reviewed and interpreted into the assessment and treatment plan.    I&O's Summary    19 Apr 2021 07:01  -  20 Apr 2021 07:00  --------------------------------------------------------  IN: 319.5 mL / OUT: 400 mL / NET: -80.5 mL    20 Apr 2021 07:01  -  21 Apr 2021 03:37  --------------------------------------------------------  IN: 584.6 mL / OUT: 700 mL / NET: -115.4 mL    Impression:  1. acute hypoxemic respiratory failure  2. ARDS  3. COVID-19 Viral PNA  4. LE DVT    Plan:  Neuro - Light sedation for comfort with precedex, melatonin for sleep hygiene    CV -  hemodynamics stable           POCUS with grossly normal RV function prior, no signs of dilatation     Pulm -  cont NIPPV support as tolerates              utilization of high level EPAP for alveolar recruitment              encouraging self proning or partial prone positioning as tolerates [id number]              actively titrating FiO2 for sats>88%              remains very high risk for intubation     GI -  PPI  NPO while on NIPPV, aspiration precautions    Renal - Cr stable, strict I/Os, Even to negative fluid balance as tolerated by hemodynamics and renal fx, trend BMP     Heme -  Full dose AC empirically given significant elevation of D-dimer, POCUS without dilated RV, LE duplex + for LE DVT, no signs of active bleeding     ID - afebrile, WBC normal yesterday, cont to monitor off abx, Remdesivir and Decadron for full courses, received Toci x 1, RVP negative, COVID+ only, Abx initiation based on discussion with ID in conjunction with clinical          features and culture data    Endo -  Aggressive glycemic control to limit FS glucose to < 180mg/dl, BS stable, A1c 6.1      COVID 19 specific considerations and therapeutic  options based on the available and rapidly changing literature      35  minutes of critical care time spent in the management of this critically ill COVID-19 patient with continuous assessments and interventions based on the interpretation of multiple databases.

## 2021-04-21 NOTE — CONSULT NOTE ADULT - ATTENDING COMMENTS
64 y/o M admitted with COVID PNA. Patient was found to have +DVT in b/l LE's. CTA pending. TTE pending. Further management pending results of above.    Thank you for allowing me to participate in the care of this patient. Will continue to follow.

## 2021-04-21 NOTE — CONSULT NOTE ADULT - ASSESSMENT
65M no pmh presents for worsening shortness of breath. Patient states that has been having fevers, chills and headache for the last 10 days. Both of his sons who are in school recently were quarantining from school due to covid. He states the last few days he's been feeling especially weak and more short of breath. His PCP tried to get him to come in for antibody infusion, but when arrived found to be hypoxic to 60's and ill appearing and was brought to ER.     In ER, pt initially on 100%NRB, but now on 100%HFNC. He received dexamethasone 10 mg iv, doxycycline and 1L ns. Pt states he feels markedly better. (16 Apr 2021 23:10)    Above Appreciated  Cardiology consult requested per patient family. Pt denies previous cardiology diagnosis and ischemic workup. Pt states he was able to participate in strenuous activity without anginal symptoms.     Dyspnea  -Xray-Moderate bilateral alveolar/interstitial perihilar infiltrates, grossly unchanged. Mild pulmonary venous congestion, improved  -ECG- ST HR @ 104, poor R wave progression, low voltage in inferior leads  -Tele- NSR HR @ 60-70s  -U/S-Deep venous thrombosis in the proximal right femoral vein and distal left posterior tibial vein  -Likely dyspnea related to +COVID    -Echo-ordered and pending  -Will continue to monitor

## 2021-04-21 NOTE — PROGRESS NOTE ADULT - ASSESSMENT
EXAM:   Alert, follows commands, sleeping comfortably when I arrived in room, on precedex  on bipap, maintaining spo2 in 90s, comfortable with good tidal volumes  bilat air entry  reg rhythm abd soft  trace edema    RADIOLOGY REVIEWED:  cxr - bilat airspace and interstitial opacities     IMPRESSION/ASSESSMENT & PLAN:   64 yo male admitted on 4/16 with COVID19 viral pneumonia, now with worsening acute respiratory failure requiring NIV, DVT.  Transferred to MICU service on 4/19.    COVID19  - s/p Actemra on 4/19  - on steroids  - NIV alternating with high flow    DVT and possible PE  - full dose anticoagulation with lovenox  - repeat D-dimer pending  - awaiting CTA to rule out PE    Overall prognosis guarded.   Extensive time spent updating sister Bushra, who asked about donating her own plasma, as well as giving him a COVID vaccine to "boost his immune system."

## 2021-04-21 NOTE — PROGRESS NOTE ADULT - ASSESSMENT
65y  Male with no PMH comes to the ER with worsening shortness of breath. Patient states that has been having fevers, chills and headache for the last 8-10 days. Both of his sons who are in school recently were quarantining from school due to covid. He states the last few days he's been feeling especially weak and more short of breath. His PCP tried to get him to come in for antibody infusion, but when arrived found to be hypoxic to 60's and ill appearing and was brought to ER. In the ER patient was initially on 100% NRB and followed by 100% HFNC. Started on Remdesivir and Dexamethasone. Now on BIPAP.      Acute Hypoxic respiratory failure  COVID-19 infection  B/L Pneumonia   Suspected PE  cough  fevers  shortness of breath      - COVID 19 PCR positive   - Blood cultures ordered   - CXR reporting clear lungs   - Continue supportive care measures  - continue to trend inflammatory markers.   - trend CBC with diff, CMP,  CRP, Ferritin,  procalcitonin q 48hours  - Avoid antibiotics unless there is a concern for a bacterial infection  - May consider vitamin C, thiamine and zinc (note lack of evidence to support benefit with COVID 19)  - Discussed Remdesivir with the patient.   - Continue Remdesivir 200mg IV x1 followed by 100mg IV daily, will extend course to complete 10 days   - s/p Actemra 4/20  - Dexamethasone 6mg Daily   - follow up all outstanding cultures  - Trend Fever  - Trend Leukocytosis      Will follow      D/W MICU team

## 2021-04-22 NOTE — PROGRESS NOTE ADULT - SUBJECTIVE AND OBJECTIVE BOX
On Admission  04-16-21 (6d)  HPI:  65M no pmh presents for worsening shortness of breath. Patient states that has been having fevers, chills and headache for the last 10 days. Both of his sons who are in school recently were quarantining from school due to covid. He states the last few days he's been feeling especially weak and more short of breath. His PCP tried to get him to come in for antibody infusion, but when arrived found to be hypoxic to 60's and ill appearing and was brought to ER.     In ER, pt initially on 100%NRB, but now on 100%HFNC. He received dexamethasone 10 mg iv, doxycycline and 1L ns. Pt states he feels markedly better. (16 Apr 2021 23:10)    PAST MEDICAL & SURGICAL HISTORY:  No pertinent past medical history    S/P shoulder surgery        Antimicrobial:  remdesivir  IVPB 100 milliGRAM(s) IV Intermittent every 24 hours    Cardiovascular:    Pulmonary:  ALBUTerol    90 MICROgram(s) HFA Inhaler 2 Puff(s) Inhalation every 6 hours PRN    Hematalogic:  aspirin enteric coated 81 milliGRAM(s) Oral daily  enoxaparin Injectable 80 milliGRAM(s) SubCutaneous two times a day    Other:  acetaminophen   Tablet .. 650 milliGRAM(s) Oral every 6 hours PRN  cholecalciferol 2000 Unit(s) Oral daily  dexAMETHasone  Injectable 6 milliGRAM(s) IV Push daily  dexMEDEtomidine Infusion 0.3 MICROgram(s)/kG/Hr IV Continuous <Continuous>  LORazepam   Injectable 0.5 milliGRAM(s) IV Push three times a day PRN  melatonin 5 milliGRAM(s) Oral at bedtime PRN  pantoprazole  Injectable 40 milliGRAM(s) IV Push daily      Drug Dosing Weight  Height (cm): 172.7 (18 Apr 2021 14:45)  Weight (kg): 82.1 (18 Apr 2021 14:45)  BMI (kg/m2): 27.5 (18 Apr 2021 14:45)  BSA (m2): 1.96 (18 Apr 2021 14:45)    T(C): 38.2 (04-22-21 @ 07:00), Max: 38.2 (04-22-21 @ 07:00)  HR: 84 (04-22-21 @ 10:00)  BP: 107/72 (04-22-21 @ 10:00)  BP(mean): 80 (04-22-21 @ 10:00)  ABP: --  ABP(mean): --  RR: 31 (04-22-21 @ 10:00)  SpO2: 94% (04-22-21 @ 10:00)          04-21 @ 07:01  -  04-22 @ 07:00  --------------------------------------------------------  IN: 1453 mL / OUT: 600 mL / NET: 853 mL              LABS:  CBC Full  -  ( 22 Apr 2021 07:11 )  WBC Count : 6.25 K/uL  RBC Count : 4.70 M/uL  Hemoglobin : 13.5 g/dL  Hematocrit : 40.6 %  Platelet Count - Automated : 228 K/uL  Mean Cell Volume : 86.4 fl  Mean Cell Hemoglobin : 28.7 pg  Mean Cell Hemoglobin Concentration : 33.3 gm/dL  Auto Neutrophil # : x  Auto Lymphocyte # : x  Auto Monocyte # : x  Auto Eosinophil # : x  Auto Basophil # : x  Auto Neutrophil % : x  Auto Lymphocyte % : x  Auto Monocyte % : x  Auto Eosinophil % : x  Auto Basophil % : x    04-22    135  |  100  |  32.0<H>  ----------------------------<  116<H>  5.3   |  22.0  |  0.76    Ca    8.6      22 Apr 2021 07:08  Phos  3.2     04-22  Mg     2.2     04-22    TPro  6.5<L>  /  Alb  2.5<L>  /  TBili  0.6  /  DBili  x   /  AST  52<H>  /  ALT  53<H>  /  AlkPhos  86  04-22    PTT - ( 21 Apr 2021 11:00 )  PTT:31.5 sec    Culture Results:   No growth at 48 hours (04-19 @ 18:14)    ____________________________________________________________________________________________________

## 2021-04-22 NOTE — PROGRESS NOTE ADULT - SUBJECTIVE AND OBJECTIVE BOX
[facility]  INFECTIOUS DISEASES AND INTERNAL MEDICINE at [place]  =======================================================  Jose G Avilez MD  Diplomates American Board of Internal Medicine and Infectious Diseases  Tel: [phone]      Fax: [phone]  =======================================================    BAILEE COPELAND [id number]    Follow up: covid  now on hiflo + NRB, feels more comfortable with then rather than bipap  + constipation  low grade fever 100.7 f this AM      Allergies:  No Known Allergies           REVIEW OF SYSTEMS:  CONSTITUTIONAL:  No Fever or chills  HEENT:   No diplopia or blurred vision.  No earache, sore throat or runny nose.  CARDIOVASCULAR:  No pressure, squeezing, strangling, tightness, heaviness or aching about the chest, neck, axilla or epigastrium.  RESPIRATORY:  No cough, shortness of breath  GASTROINTESTINAL:  No nausea, vomiting or diarrhea. +constipation  GENITOURINARY:  No dysuria, frequency or urgency. No Blood in urine  MUSCULOSKELETAL:  no joint aches, no muscle pain  SKIN:  No change in skin, hair or nails.  NEUROLOGIC:  No Headaches, seizures or weakness.  PSYCHIATRIC:  No disorder of thought or mood.  ENDOCRINE:  No heat or cold intolerance  HEMATOLOGICAL:  No easy bruising or bleeding.       Physical Exam:  GEN: NAD, pleasant on hiflo + NRB  HEENT: normocephalic and atraumatic. EOMI. PERRL.  Anicteric   NECK: Supple.   LUNGS: Clear to auscultation.  HEART: Regular rate and rhythm without murmur.  ABDOMEN: Soft, nontender, and nondistended.  Positive bowel sounds.    : No CVA tenderness  EXTREMITIES: Without any edema.  MSK: no joint swelling  NEUROLOGIC: Cranial nerves II through XII are grossly intact. No focal deficits  PSYCHIATRIC: Appropriate affect .  SKIN: No Rash      Vitals:    T(F): 100.7 (22 Apr 2021 07:00), Max: 100.7 (22 Apr 2021 07:00)  HR: 65 (22 Apr 2021 13:00)  BP: 115/72 (22 Apr 2021 13:00)  RR: 23 (22 Apr 2021 13:00)  SpO2: 87% (22 Apr 2021 13:00) (85% - 95%)  temp max in last 48H T(F): , Max: 102 (04-21-21 @ 08:11)    Current Antibiotics:  remdesivir  IVPB 100 milliGRAM(s) IV Intermittent every 24 hours    Other medications:  aspirin enteric coated 81 milliGRAM(s) Oral daily  cholecalciferol 2000 Unit(s) Oral daily  dexAMETHasone  Injectable 6 milliGRAM(s) IV Push daily  dexMEDEtomidine Infusion 0.3 MICROgram(s)/kG/Hr IV Continuous <Continuous>  enoxaparin Injectable 80 milliGRAM(s) SubCutaneous two times a day  pantoprazole  Injectable 40 milliGRAM(s) IV Push daily                            13.5   6.25  )-----------( 228      ( 22 Apr 2021 07:11 )             40.6     04-22    135  |  100  |  32.0<H>  ----------------------------<  116<H>  5.3   |  22.0  |  0.76    Ca    8.6      22 Apr 2021 07:08  Phos  3.2     04-22  Mg     2.2     04-22    TPro  6.5<L>  /  Alb  2.5<L>  /  TBili  0.6  /  DBili  x   /  AST  52<H>  /  ALT  53<H>  /  AlkPhos  86  04-22    RECENT CULTURES:  04-19 @ 18:14 .Blood Blood-Peripheral     No growth at 48 hours        04-16 @ 18:27          NotDetec      WBC Count: 6.25 K/uL (04-22-21 @ 07:11)  WBC Count: 6.41 K/uL (04-21-21 @ 11:00)  WBC Count: 6.57 K/uL (04-20-21 @ 05:43)    Creatinine, Serum: 0.76 mg/dL (04-22-21 @ 07:08)  Creatinine, Serum: 0.78 mg/dL (04-21-21 @ 11:00)  Creatinine, Serum: 0.85 mg/dL (04-20-21 @ 05:41)  Creatinine, Serum: 0.86 mg/dL (04-19-21 @ 07:41)  Creatinine, Serum: 0.77 mg/dL (04-18-21 @ 13:35)    C-Reactive Protein, Serum: 128 mg/L (04-22-21 @ 07:09)  C-Reactive Protein, Serum: 174 mg/L (04-21-21 @ 11:00)  C-Reactive Protein, Serum: 211 mg/L (04-20-21 @ 05:41)  C-Reactive Protein, Serum: >422 mg/L (04-16-21 @ 18:00)    Ferritin, Serum: 1254 ng/mL (04-22-21 @ 07:09)  Ferritin, Serum: 1328 ng/mL (04-21-21 @ 11:00)  Ferritin, Serum: 1075 ng/mL (04-20-21 @ 05:41)  Ferritin, Serum: 829 ng/mL (04-16-21 @ 18:00)      Procalcitonin, Serum: 0.67 ng/mL (04-19-21 @ 18:07)  Procalcitonin, Serum: 5.93 ng/mL (04-16-21 @ 18:00)     Rapid RVP Result: NotDetec (04-16-21 @ 18:27)  SARS-CoV-2: Detected (04-16-21 @ 18:27)

## 2021-04-22 NOTE — PROGRESS NOTE ADULT - ASSESSMENT
65y  Male with no PMH comes to the ER with worsening shortness of breath. Patient states that has been having fevers, chills and headache for the last 8-10 days. Both of his sons who are in school recently were quarantining from school due to covid. He states the last few days he's been feeling especially weak and more short of breath. His PCP tried to get him to come in for antibody infusion, but when arrived found to be hypoxic to 60's and ill appearing and was brought to ER. In the ER patient was initially on 100% NRB and followed by 100% HFNC. Started on Remdesivir and Dexamethasone. Now on BIPAP.      Acute Hypoxic respiratory failure  COVID-19 infection  B/L Pneumonia   DVT  cough  fevers  shortness of breath      - COVID 19 PCR positive   - Blood cultures 4/19 ngtd, repeat BCX if febrile  - CXR reporting clear lungs   - CTA (-) for PE  - USG venous doppler + DVT rt leg  - inflammatory markers improved   - trend CBC with diff, CMP,  CRP, Ferritin,  procalcitonin q 48hours  - Avoid antibiotics unless there is a concern for a bacterial infection  - May consider vitamin C, thiamine and zinc (note lack of evidence to support benefit with COVID 19)  - Continue Remdesivir 200mg IV x1 followed by 100mg IV daily, course extended  - s/p Actemra 4/20  - Dexamethasone 6mg Daily   - Covid ab + on admission. Did not receive COVID vaccine prior to admission. Defer convalescent plasma  - follow up all outstanding cultures  - Trend Fever  - Trend Leukocytosis      Will follow

## 2021-04-22 NOTE — PROGRESS NOTE ADULT - ASSESSMENT
65M admitted with COVID PNA, found to have b/l LE DVT's. Had CTA yesterday, no evidence of PE. Also had limited echo, which showed normal RV size and systolic function. Cardiology evaluation was requested by patient's family. Patient's dyspnea is attributed to COVID PNA; he has no history of chest pain or dyspnea with exertion. No evidence of right heart strain on echo. No acute EKG changes. No events on telemetry. Patient does not require further inpatient cardiac workup at this time. Continue AC for DVT. Further management per primary team.    Above was discussed with patient and his sister, Bushra Rudolph.     Thank you for allowing me to participate in the care of this patient.  Please re consult as needed.

## 2021-04-22 NOTE — PROGRESS NOTE ADULT - ASSESSMENT
On bipap overnight, this morning tolerating high flow    EXAM:   Alert, follows commands, sleeping comfortably when I arrived in room, on precedex  on bipap, maintaining spo2 in 90s, comfortable with good tidal volumes  bilat air entry  reg rhythm abd soft  trace edema    RADIOLOGY REVIEWED:  cxr - bilat airspace and interstitial opacities     IMPRESSION/ASSESSMENT & PLAN:   64 yo male admitted on 4/16 with COVID19 viral pneumonia, now with worsening acute respiratory failure requiring NIV, DVT.  Transferred to MICU service on 4/19.    COVID19  - s/p Actemra on 4/19  - on steroids  - NIV at night alternating with high flow during day    DVT   - full dose anticoagulation with lovenox  - d-dimer trending down  - cta negative for PE    Overall prognosis guarded.   Sister Bushra updated by phone, all questions answered.  She asked me to order vitamin D levels which I complied with.  She also requested a cardiology consult which has been called.

## 2021-04-22 NOTE — PROGRESS NOTE ADULT - SUBJECTIVE AND OBJECTIVE BOX
[facility]                                                               Office:  [address] [place] [address]                                                              Telephone: [phone]. Fax:[phone]                                                                        CARDIOLOGY CONSULTATION NOTE                                                                                             Consult requested by:  Dr. Sanchez  Reason for Consultation: COVID PNA  History obtained by: Patient and medical record   obtained: No    Chief complaint:    Patient is a 65y old  Male who presents with a chief complaint of covid19, hypoxia (21 Apr 2021 11:01)    Patient states he is feeling better. Denies CP, palpitations, dizziness, lightheadedness. Continues to complain of dyspnea, and remains on HFNC. He is in sinus rhythm with no events on tele.     REVIEW OF SYMPTOMS:     CONSTITUTIONAL: No fever, weight loss, or fatigue  ENMT:  No difficulty hearing, tinnitus, vertigo; No sinus or throat pain  NECK: No pain or stiffness  CARDIOVASCULAR: See HPI  RESPIRATORY: See HPI  : No dysuria, no hematuria   GI: No dark color stool, no melena, no diarrhea, no constipation, no abdominal pain   NEURO: No headache, no dizziness, no slurred speech   MUSCULOSKELETAL: No joint pain or swelling; No muscle, back, or extremity pain  PSYCH: No agitation, no anxiety.    ALL OTHER REVIEW OF SYSTEMS ARE NEGATIVE.    ALLERGIES: Allergies    No Known Allergies    Intolerances      PAST MEDICAL HISTORY  No pertinent past medical history    PAST SURGICAL HISTORY  S/P shoulder surgery    FAMILY HISTORY:  No pertinent family history in first degree relatives    SOCIAL HISTORY:   CIGARETTES:   Denies  ALCOHOL: occasional beer  DRUGS: Denies      CURRENT MEDICATIONS:  dexMEDEtomidine Infusion  pantoprazole  Injectable  aspirin enteric coated  cholecalciferol  dexAMETHasone  Injectable  enoxaparin Injectable        HOME MEDICATIONS:    Aspirin Enteric Coated 81 mg oral delayed release tablet: 1 tab(s) orally once a day (16 Apr 2021 23:47)  PriLOSEC 20 mg oral delayed release capsule: 1 cap(s) orally once a day (16 Apr 2021 23:47)  Vitamin D3 1000 intl units (25 mcg) oral capsule: 1 tab(s) orally once a day (16 Apr 2021 23:47)      Vital Signs Last 24 Hrs  T(C): 36.4 (21 Apr 2021 16:05), Max: 38.9 (21 Apr 2021 08:11)  T(F): 97.5 (21 Apr 2021 16:05), Max: 102 (21 Apr 2021 08:11)  HR: 71 (21 Apr 2021 16:00) (58 - 88)  BP: 107/70 (21 Apr 2021 16:00) (86/59 - 125/80)  BP(mean): 80 (21 Apr 2021 16:00) (65 - 88)  RR: 21 (21 Apr 2021 16:00) (18 - 34)  SpO2: 93% (21 Apr 2021 16:00) (90% - 97%)      PHYSICAL EXAM:  Constitutional: Comfortable . No acute distress.   HEENT: Atraumatic and normocephalic , neck is supple . no JVD. No carotid bruit. PEERL   CNS: A&Ox3. No focal deficits. EOMI.    Lymph Nodes: Cervical : Not palpable.  Respiratory: RLL diminished  Cardiovascular: S1S2 RRR. No murmur/rubs or gallop.  Gastrointestinal: Soft non-tender and non distended . +Bowel sounds. negative Diaz's sign   Extremities: No edema.   Psychiatric: Calm . no agitation.  Skin: No skin rash/ulcers visualized to face, hands or feet     Intake and output:   04-20 @ 07:01  -  04-21 @ 07:00  --------------------------------------------------------  IN: 596.8 mL / OUT: 1000 mL / NET: -403.2 mL    04-21 @ 07:01 - 04-21 @ 17:10  --------------------------------------------------------  IN: 610 mL / OUT: 200 mL / NET: 410 mL        LABS:                        13.9   6.41  )-----------( 284      ( 21 Apr 2021 11:00 )             42.4     04-21    136  |  98  |  37.0<H>  ----------------------------<  149<H>  4.9   |  24.0  |  0.78    Ca    8.8      21 Apr 2021 11:00  Phos  3.9     04-21  Mg     2.4     04-21    TPro  6.8  /  Alb  2.9<L>  /  TBili  0.7  /  DBili  x   /  AST  40<H>  /  ALT  63<H>  /  AlkPhos  85  04-21      ;p-BNP=  PTT - ( 21 Apr 2021 11:00 )  PTT:31.5 sec      INTERPRETATION OF TELEMETRY: NSR HR @ 60-70s  ECG: ST HR @ 104, poor R wave progression, low voltage in inferior leads     RADIOLOGY & ADDITIONAL STUDIES:      X-ray: < from: Xray Chest 1 View- PORTABLE-Routine (04.21.21 @ 06:56) >  IMPRESSION:  Moderate bilateral alveolar/interstitial perihilar infiltrates, grossly unchanged.    Mild pulmonary venous congestion, improved    CT scan:   *  No pulmonary embolism to the segmental branches. Limited visualization of subsegmental branches secondary to respiratory motion.  *  Bilateral groundglass opacities compatible with COVID-19 pneumonia. Elevated left hemidiaphragm with left basilar atelectasis.    US:  < from: US Duplex Venous Lower Ext Complete, Bilateral (04.20.21 @ 08:15) >  IMPRESSION:  Deep venous thrombosis in the proximal right femoral vein and distal left posterior tibial vein.        Limited TTE:  Right Ventricle: The right ventricle was not well visualized. The right ventricular size is normal. RV systolic function is normal. TV S' 0.2 m/s.  Venous: The inferior vena cava is normal. The inferior vena cava was normal sized, with respiratory size variation greater than 50%.

## 2021-04-23 NOTE — PROGRESS NOTE ADULT - SUBJECTIVE AND OBJECTIVE BOX
On Admission  04-16-21 (7d)  HPI:  65M no pmh presents for worsening shortness of breath. Patient states that has been having fevers, chills and headache for the last 10 days. Both of his sons who are in school recently were quarantining from school due to covid. He states the last few days he's been feeling especially weak and more short of breath. His PCP tried to get him to come in for antibody infusion, but when arrived found to be hypoxic to 60's and ill appearing and was brought to ER.     In ER, pt initially on 100%NRB, but now on 100%HFNC. He received dexamethasone 10 mg iv, doxycycline and 1L ns. Pt states he feels markedly better. (16 Apr 2021 23:10)    PAST MEDICAL & SURGICAL HISTORY:  No pertinent past medical history    S/P shoulder surgery        Antimicrobial:  remdesivir  IVPB 100 milliGRAM(s) IV Intermittent every 24 hours    Cardiovascular:    Pulmonary:  ALBUTerol    90 MICROgram(s) HFA Inhaler 2 Puff(s) Inhalation every 6 hours PRN    Hematalogic:  aspirin enteric coated 81 milliGRAM(s) Oral daily  enoxaparin Injectable 80 milliGRAM(s) SubCutaneous two times a day    Other:  acetaminophen   Tablet .. 650 milliGRAM(s) Oral every 6 hours PRN  cholecalciferol 2000 Unit(s) Oral daily  clonazePAM  Tablet 0.5 milliGRAM(s) Oral every 8 hours  dexAMETHasone  Injectable 6 milliGRAM(s) IV Push daily  dexMEDEtomidine Infusion 0.3 MICROgram(s)/kG/Hr IV Continuous <Continuous>  LORazepam   Injectable 0.5 milliGRAM(s) IV Push three times a day PRN  melatonin 5 milliGRAM(s) Oral at bedtime PRN  morphine  - Injectable 2 milliGRAM(s) IV Push every 4 hours PRN  pantoprazole  Injectable 40 milliGRAM(s) IV Push daily      Drug Dosing Weight  Height (cm): 172.7 (18 Apr 2021 14:45)  Weight (kg): 82.1 (18 Apr 2021 14:45)  BMI (kg/m2): 27.5 (18 Apr 2021 14:45)  BSA (m2): 1.96 (18 Apr 2021 14:45)    T(C): 37.7 (04-23-21 @ 08:23), Max: 37.7 (04-23-21 @ 08:23)  HR: 60 (04-23-21 @ 13:00)  BP: 117/74 (04-23-21 @ 13:00)  BP(mean): 88 (04-23-21 @ 13:00)  ABP: --  ABP(mean): --  RR: 21 (04-23-21 @ 13:00)  SpO2: 96% (04-23-21 @ 13:00)          04-22 @ 07:01  -  04-23 @ 07:00  --------------------------------------------------------  IN: 671 mL / OUT: 875 mL / NET: -204 mL              LABS:  CBC Full  -  ( 23 Apr 2021 06:56 )  WBC Count : 7.73 K/uL  RBC Count : 4.87 M/uL  Hemoglobin : 14.0 g/dL  Hematocrit : 42.0 %  Platelet Count - Automated : 303 K/uL  Mean Cell Volume : 86.2 fl  Mean Cell Hemoglobin : 28.7 pg  Mean Cell Hemoglobin Concentration : 33.3 gm/dL  Auto Neutrophil # : x  Auto Lymphocyte # : x  Auto Monocyte # : x  Auto Eosinophil # : x  Auto Basophil # : x  Auto Neutrophil % : x  Auto Lymphocyte % : x  Auto Monocyte % : x  Auto Eosinophil % : x  Auto Basophil % : x    04-23    136  |  98  |  29.0<H>  ----------------------------<  92  4.9   |  25.0  |  0.63    Ca    8.5<L>      23 Apr 2021 06:56  Phos  2.7     04-23  Mg     2.2     04-23    TPro  6.6  /  Alb  2.7<L>  /  TBili  0.7  /  DBili  x   /  AST  50<H>  /  ALT  55<H>  /  AlkPhos  104  04-23          ____________________________________________________________________________________________________

## 2021-04-23 NOTE — PROGRESS NOTE ADULT - SUBJECTIVE AND OBJECTIVE BOX
Patient is a 64 y/o male who presents with a chief complaint of COVID-19 PNA (22 Apr 2021 13:51)    BRIEF HOSPITAL COURSE: 64 y/o M w/ a PMHx of     Events last 24 hours: ***    PAST MEDICAL & SURGICAL HISTORY:  No pertinent past medical history  S/P shoulder surgery    Review of Systems:  CONSTITUTIONAL: No fever, chills, or fatigue.  EYES: No eye pain, visual disturbances, or discharge.  ENMT:  No difficulty hearing, tinnitus, or vertigo. No sinus or throat pain.  NECK: No pain or stiffness.  RESPIRATORY: No shortness of breath, cough, or wheezing.  CARDIOVASCULAR: No chest pain, palpitations, dizziness, or leg swelling.  GASTROINTESTINAL: No abdominal or epigastric pain. No nausea, vomiting, diarrhea, or constipation. No hematemesis, melena, or hematochezia.  GENITOURINARY: No dysuria, increased frequency, hematuria, or incontinence.  NEUROLOGICAL: No headaches, memory loss, loss of strength, numbness, or tremors.  SKIN: No itching, burning, rashes, or lesions.  MUSCULOSKELETAL: No joint pain or swelling. No muscle, back, or extremity pain.  PSYCHIATRIC: No depression, anxiety, mood swings, or difficulty sleeping.    Medications:  remdesivir  IVPB 100 milliGRAM(s) IV Intermittent every 24 hours  ALBUTerol    90 MICROgram(s) HFA Inhaler 2 Puff(s) Inhalation every 6 hours PRN  acetaminophen   Tablet .. 650 milliGRAM(s) Oral every 6 hours PRN  clonazePAM  Tablet 0.5 milliGRAM(s) Oral every 8 hours  dexMEDEtomidine Infusion 0.3 MICROgram(s)/kG/Hr IV Continuous <Continuous>  LORazepam   Injectable 0.5 milliGRAM(s) IV Push three times a day PRN  melatonin 5 milliGRAM(s) Oral at bedtime PRN  morphine  - Injectable 2 milliGRAM(s) IV Push every 4 hours PRN  aspirin enteric coated 81 milliGRAM(s) Oral daily  enoxaparin Injectable 80 milliGRAM(s) SubCutaneous two times a day  pantoprazole  Injectable 40 milliGRAM(s) IV Push daily  dexAMETHasone  Injectable 6 milliGRAM(s) IV Push daily  cholecalciferol 2000 Unit(s) Oral daily    ICU Vital Signs Last 24 Hrs  T(C): 37.7 (23 Apr 2021 08:23), Max: 37.7 (23 Apr 2021 08:23)  T(F): 99.9 (23 Apr 2021 08:23), Max: 99.9 (23 Apr 2021 08:23)  HR: 60 (23 Apr 2021 13:00) (60 - 88)  BP: 117/74 (23 Apr 2021 13:00) (105/71 - 136/78)  BP(mean): 88 (23 Apr 2021 13:00) (79 - 92)  ABP: --  ABP(mean): --  RR: 21 (23 Apr 2021 13:00) (21 - 36)  SpO2: 96% (23 Apr 2021 13:00) (79% - 97%)    I&O's Detail    22 Apr 2021 07:01  -  23 Apr 2021 07:00  --------------------------------------------------------  IN:    Dexmedetomidine: 71 mL    Oral Fluid: 600 mL  Total IN: 671 mL    OUT:    Voided (mL): 875 mL  Total OUT: 875 mL    Total NET: -204 mL    23 Apr 2021 07:01  -  23 Apr 2021 13:28  --------------------------------------------------------  IN:    Dexmedetomidine: 102.1 mL  Total IN: 102.1 mL    OUT:  Total OUT: 0 mL    Total NET: 102.1 mL    LABS:                        14.0   7.73  )-----------( 303      ( 23 Apr 2021 06:56 )             42.0     04-23    136  |  98  |  29.0<H>  ----------------------------<  92  4.9   |  25.0  |  0.63    Ca    8.5<L>      23 Apr 2021 06:56  Phos  2.7     04-23  Mg     2.2     04-23    TPro  6.6  /  Alb  2.7<L>  /  TBili  0.7  /  DBili  x   /  AST  50<H>  /  ALT  55<H>  /  AlkPhos  104  04-23    CAPILLARY BLOOD GLUCOSE    CULTURES:  Culture Results:   No growth at 48 hours (04-19 @ 18:14)  Rapid RVP Result: NotDetec (04-16 @ 18:27)    Physical Examination:    General: Well appearing, lying in bed in NAD.      HEENT: Pupils equal, reactive to light. Symmetric. No scleral icterus or injection.    PULM: Clear to auscultation B/L. No wheezes, rales, or rhonchi apprecaited. No significant sputum production or increased respiratory effort.    NECK: Supple, no lymphadenopathy, trachea midline.    CVS: Regular rate and rhythm, no murmurs appreciated, +s1/s2.    ABD: Soft, nondistended, nontender, normoactive bowel sounds.    EXT: No edema, nontender.    SKIN: Warm and well perfused, no rashes noted.    NEURO: Alert, oriented, interactive, nonfocal.    POCUS:    DEVICES:   LINES:  TAVARES:    RADIOLOGY: ***    CRITICAL CARE TIME SPENT: ***

## 2021-04-23 NOTE — PROGRESS NOTE ADULT - ASSESSMENT
Had a panic attack overnight reportedly, started on precedex    EXAM:   sleeping comfortably, on precedex  on bipap, maintaining spo2 in 90s, comfortable with good tidal volumes  bilat air entry  reg rhythm abd soft  trace edema    RADIOLOGY REVIEWED:  cxr - bilat airspace and interstitial opacities     IMPRESSION/ASSESSMENT & PLAN:   66 yo male admitted on 4/16 with COVID19 viral pneumonia, now with acute respiratory failure requiring NIV, DVT.  Transferred to MICU service on 4/19.    COVID19  - s/p Actemra on 4/19  - on steroids  - NIV at night alternating with high flow during day    DVT   - full dose anticoagulation with lovenox  - d-dimer trending down  - cta negative for PE    Overall prognosis guarded.   Family updated daily.

## 2021-04-24 NOTE — PROGRESS NOTE ADULT - SUBJECTIVE AND OBJECTIVE BOX
Patient is a 65y old  Male who presents with a chief complaint of covid19, hypoxia (24 Apr 2021 20:49)      BRIEF HOSPITAL COURSE: 65M no  PMH who p/w worsening SOB, fevers, chills, headache, f/w hypoxic respiratory failure 2/2 COVID-19 PNA. His PCP tried to get him to come in for antibody infusion, but when arrived found to be hypoxic to 60's and ill appearing and was brought to ER. Required 100% NRB in the ED and then transitioned to 100% HFNC. Received Actemra on 4/19, started on steroids, full dose anticoagulation, and alternates between BPAP and HFNC.     Events last 24 hours:   - Continues to require precedex continuous infusion for mask compliance; high risk for intubation  - No other active issues.      PAST MEDICAL & SURGICAL HISTORY:  No pertinent past medical history    S/P shoulder surgery          Hosp day #8d    Vent day #        Vital signs / Reviewed and Physical Exam Performed where pertinent and urgently required    Lab / Radiology  studies / ABG / Meds -  reviewed and interpreted into the assessment and treatment plan.     Patient is a 65y old  Male who presents with a chief complaint of covid19, hypoxia (24 Apr 2021 20:49)      BRIEF HOSPITAL COURSE: 65M no  PMH who p/w worsening SOB, fevers, chills, headache, f/w hypoxic respiratory failure 2/2 COVID-19 PNA. His PCP tried to get him to come in for antibody infusion, but when arrived found to be hypoxic to 60's and ill appearing and was brought to ER. Required 100% NRB in the ED and then transitioned to 100% HFNC. Received Actemra on 4/19, started on steroids, full dose anticoagulation, and alternates between BPAP and HFNC.     Events last 24 hours:   - Continues to require precedex continuous infusion for mask compliance; high risk for intubation  - No other active issues.      PAST MEDICAL & SURGICAL HISTORY:  No pertinent past medical history    S/P shoulder surgery          Hosp day #8d        Vital signs / Reviewed and Physical Exam Performed where pertinent and urgently required    Lab / Radiology  studies / ABG / Meds -  reviewed and interpreted into the assessment and treatment plan.

## 2021-04-24 NOTE — PROGRESS NOTE ADULT - ASSESSMENT
66 yo male admitted on 4/16 with COVID19 viral pneumonia, now with acute respiratory failure requiring NIV, DVT.  Transferred to MICU service on 4/19.    COVID19  - s/p Actemra on 4/19  - on steroids - Medrol 40mg IV Q8H  - NIV PRN + QHS alternating with HFNC during day  - Monitor O2 requiremetns  - Morphine 2mg IV Q4H PRN for dyspnea  - Tylenol PRN fevers    Agitation  - On Precedex gtt  - Klonopin 0.5mg PO Q8H    DVT   - full dose anticoagulation with lovenox  - d-dimer trending down  - CTPA (4/21) negative for PE    Overall prognosis guarded.     Omero Rivera M.D.  Pulmonary & Critical Care Medicine  [facility]  Pulmonary and Sleep Medicine at [place]  [address]  [place], [place] [address]  T: [phone]  F: [phone]   64 yo male admitted on 4/16 with COVID19 viral pneumonia, now with acute respiratory failure requiring NIV, DVT.  Transferred to MICU service on 4/19.    COVID19  - s/p Actemra on 4/19  - on steroids - Medrol 40mg IV Q8H  - NIV PRN + QHS alternating with HFNC during day  - Monitor O2 requiremetns  - Morphine 2mg IV Q4H PRN for dyspnea  - Tylenol PRN fevers    Agitation  - On Precedex gtt  - Klonopin 0.5mg PO Q8H    DVT   - full dose anticoagulation with lovenox  - d-dimer trending down  - CTPA (4/21) negative for PE    Overall prognosis guarded.   Attempted to reach out to family, with no answer. Will continue to try.    Omero Rivera M.D.  Pulmonary & Critical Care Medicine  [facility]  Pulmonary and Sleep Medicine at [place]  [address]  [place], [place] [address]  T: [phone]  F: [phone]

## 2021-04-24 NOTE — PROGRESS NOTE ADULT - SUBJECTIVE AND OBJECTIVE BOX
Patient is a 65y old  Male who presents with a chief complaint of covid19, hypoxia (23 Apr 2021 13:32)      BRIEF HOSPITAL COURSE:   65M no significant PMH who p/w worsening SOB, fevers, chills, headache, f/w hypoxic respiratory failure 2/2 COVID-19 PNA. His PCP tried to get him to come in for antibody infusion, but when arrived found to be hypoxic to 60's and ill appearing and was brought to ER. Required 100% NRB in the ED and then transitioned to 100% HFNC. Received Actemra on 4/19, started on steroids, full dose anticoagulation, and alternates between BPAP and HFNC.       PAST MEDICAL & SURGICAL HISTORY:  No pertinent past medical history    S/P shoulder surgery          Medications:  remdesivir  IVPB 100 milliGRAM(s) IV Intermittent every 24 hours      ALBUTerol    90 MICROgram(s) HFA Inhaler 2 Puff(s) Inhalation every 6 hours PRN    acetaminophen   Tablet .. 650 milliGRAM(s) Oral every 6 hours PRN  clonazePAM  Tablet 0.5 milliGRAM(s) Oral every 8 hours  dexMEDEtomidine Infusion 0.3 MICROgram(s)/kG/Hr IV Continuous <Continuous>  LORazepam   Injectable 0.5 milliGRAM(s) IV Push three times a day PRN  melatonin 5 milliGRAM(s) Oral at bedtime PRN  morphine  - Injectable 2 milliGRAM(s) IV Push every 4 hours PRN      aspirin enteric coated 81 milliGRAM(s) Oral daily  enoxaparin Injectable 80 milliGRAM(s) SubCutaneous two times a day    pantoprazole  Injectable 40 milliGRAM(s) IV Push daily      methylPREDNISolone sodium succinate Injectable 40 milliGRAM(s) IV Push every 8 hours    cholecalciferol 2000 Unit(s) Oral daily                ICU Vital Signs Last 24 Hrs  T(C): 36.4 (24 Apr 2021 08:11), Max: 36.6 (23 Apr 2021 22:00)  T(F): 97.6 (24 Apr 2021 08:11), Max: 97.9 (23 Apr 2021 22:00)  HR: 66 (24 Apr 2021 14:00) (59 - 78)  BP: 122/82 (24 Apr 2021 14:00) (101/72 - 137/90)  BP(mean): 92 (24 Apr 2021 14:00) (74 - 100)  ABP: --  ABP(mean): --  RR: 24 (24 Apr 2021 14:00) (21 - 32)  SpO2: 92% (24 Apr 2021 14:00) (83% - 97%)          I&O's Detail    23 Apr 2021 07:01  -  24 Apr 2021 07:00  --------------------------------------------------------  IN:    Dexmedetomidine: 272.1 mL    Oral Fluid: 500 mL  Total IN: 772.1 mL    OUT:    Voided (mL): 600 mL  Total OUT: 600 mL    Total NET: 172 mL      24 Apr 2021 07:01  -  24 Apr 2021 15:49  --------------------------------------------------------  IN:    Dexmedetomidine: 80 mL  Total IN: 80 mL    OUT:    Voided (mL): 400 mL  Total OUT: 400 mL    Total NET: -320 mL            LABS:                        13.2   4.02  )-----------( 291      ( 24 Apr 2021 04:42 )             40.8     04-24    137  |  100  |  34.0<H>  ----------------------------<  200<H>  4.9   |  25.0  |  0.54    Ca    8.4<L>      24 Apr 2021 04:42  Phos  3.7     04-24  Mg     2.3     04-24    TPro  6.2<L>  /  Alb  2.5<L>  /  TBili  0.6  /  DBili  x   /  AST  37  /  ALT  47<H>  /  AlkPhos  101  04-24          CAPILLARY BLOOD GLUCOSE            CULTURES:  Culture Results:   No growth at 48 hours (04-19 @ 18:14)      Physical Examination:  GENERAL: In NAD   HEENT: NC/AT  NECK: Supple  PULM: On BPAP, with coarse mechanical breath sounds B/L  CVS: +S1, S2  ABD: Soft, non-tender  EXT: No pedal edema  SKIN: Warm and well perfused, no rashes noted.  NEURO: Grossly non-focal    DEVICES:     RADIOLOGY:   < from: CT Angio Chest w/ IV Cont (04.21.21 @ 18:28) >  EXAM:  CT ANGIO CHEST (W)AW IC                          PROCEDURE DATE:  04/21/2021          INTERPRETATION:  CLINICAL INFORMATION: COVID-19 positive with deep vein thrombosis    COMPARISON: Same day chest x-ray, chest CT 3/29/2019    CONTRAST/COMPLICATIONS:  IV Contrast: Omnipaque 350  59 cc administered   41 cc discarded  Oral Contrast: NONE  Complications: None reported at time of study completion    PROCEDURE:  CT Angiography of the Chest.  Sagittal and coronal reformats were performed as well as 3D (MIP) reconstructions.    FINDINGS:    PULMONARY ARTERIES: No pulmonary embolism to the segmental branches. Limited visualization of subsegmental branches secondary to respiratory motion.    LUNGS AND LARGE AIRWAYS: The central airways are patent. Bilateral groundglass opacities compatible with COVID-19 pneumonia. Elevated left hemidiaphragm with left basilar atelectasis.  PLEURA: No pleural abnormality.  VESSELS: Normal caliber aorta.  HEART: Normal heart size. No pericardial effusion.  MEDIASTINUM AND MACHELLE: No adenopathy.  CHEST WALL AND LOWER NECK: No masses.  VISUALIZED UPPER ABDOMEN: Limited visualization is unremarkable.  BONES: No acute bony abnormality.    IMPRESSION:  *  No pulmonary embolism to the segmental branches. Limited visualization of subsegmental branches secondary to respiratory motion.  *  Bilateral groundglass opacities compatible with COVID-19 pneumonia. Elevated left hemidiaphragm with left basilar atelectasis.    < end of copied text >

## 2021-04-24 NOTE — PROGRESS NOTE ADULT - ASSESSMENT
65y  Male with no PMH comes to the ER with worsening shortness of breath. Patient states that has been having fevers, chills and headache for the last 8-10 days. Both of his sons who are in school recently were quarantining from school due to covid. He states the last few days he's been feeling especially weak and more short of breath. His PCP tried to get him to come in for antibody infusion, but when arrived found to be hypoxic to 60's and ill appearing and was brought to ER. In the ER patient was initially on 100% NRB and followed by 100% HFNC. Started on Remdesivir and Dexamethasone. Now on BIPAP.      Acute Hypoxic respiratory failure  COVID-19 infection  B/L Pneumonia   DVT  cough  fevers  shortness of breath    -on bipap  - COVID 19 PCR positive   - Blood cultures 4/19 ngtd, repeat BCX if febrile  - CXR reporting clear lungs   - CTA (-) for PE  - USG venous doppler + DVT rt leg  - inflammatory markers improved ferritin high  - trend CBC with diff, CMP,  CRP, Ferritin,  procalcitonin q 48hours  - Avoid antibiotics unless there is a concern for a bacterial infection  - May consider vitamin C, thiamine and zinc (note lack of evidence to support benefit with COVID 19)  - Continue Remdesivir 200mg IV x1 followed by 100mg IV daily, course extended  - s/p Actemra 4/20  - Dexamethasone 6mg Daily   - Covid ab + on admission. Did not receive COVID vaccine prior to admission. Defer convalescent plasma  - follow up all outstanding cultures  - Trend Fever  - Trend Leukocytosis      Will follow

## 2021-04-24 NOTE — PROGRESS NOTE ADULT - ASSESSMENT
Assessment/Plan/Therapeutic interventions      #Neuro   - continue with precedex continuous infusion to assist with mask compliance  - c/w klonopin q8h  - Will deescalate as clinically appropriate    #CV   -  Vasopressor support as needed to maintain MAP 65; currently not requiring vasoactive gtts and hemodynamically stable  - Target net even to negative as tolerated                #Pulm  - Saturating 100% on AVAPS  - Trials of prone positioning as tolerated  - Aggressive chest PT and pulmonary toileting  - Peridex oral care    GI -  PPI  Regular diet, mild transaminitis improved.      Renal - Even to negative fluid balance as tolerated by hemodynamics and renal fx.     Heme -  Full dose AC with enoxaparin 2/2 elevated DDimer    ID - ABX discontinuation based on discussion with ID in conjunction with clinical features, culture data, and judicious procalcitonin monitoring.  Currently on remdesivir.  No leukocytosis or fever.    Endo -  Aggressive glycemic control to limit FS glucose to < 180mg/dl.      COVID 19 specific considerations and therapeutic  options based on the available and rapidly changing literature    Goals of care considerations:  Ongoing assessment for patient specific treatment options based on progression or decline.  I have involved the family with updates and requests in guidance for medical decision making.          45 Minutes of critical care time spent in the management of this critically ill COVID-19 patient/PUI patient with continuous assessments and interventions based on the interpretation of multiple databases.     Assessment/Plan/Therapeutic interventions    Acute Hypoxic Respiratory Failure  COVID-19 PNA  Transaminitis  Delirium/Agitation      #Neuro   - continue with precedex continuous infusion to assist with mask compliance  - c/w klonopin q8h  - Will deescalate as clinically appropriate    #CV   -  Vasopressor support as needed to maintain MAP 65; currently not requiring vasoactive gtts and hemodynamically stable  - Target net even to negative as tolerated                #Pulm  - Saturating 100% on AVAPS  - Trials of prone positioning as tolerated  - Aggressive chest PT and pulmonary toileting  - Peridex oral care    GI -  PPI  Regular diet, mild transaminitis improved.      Renal - Even to negative fluid balance as tolerated by hemodynamics and renal fx.     Heme -  Full dose AC with enoxaparin 2/2 elevated DDimer    ID - ABX discontinuation based on discussion with ID in conjunction with clinical features, culture data, and judicious procalcitonin monitoring.  Currently on remdesivir.  No leukocytosis or fever.    Endo -  Aggressive glycemic control to limit FS glucose to < 180mg/dl.      COVID 19 specific considerations and therapeutic  options based on the available and rapidly changing literature    Goals of care considerations:  Ongoing assessment for patient specific treatment options based on progression or decline.  I have involved the family with updates and requests in guidance for medical decision making.          45 Minutes of critical care time spent in the management of this critically ill COVID-19 patient/PUI patient with continuous assessments and interventions based on the interpretation of multiple databases.

## 2021-04-24 NOTE — PROGRESS NOTE ADULT - SUBJECTIVE AND OBJECTIVE BOX
[facility]  INFECTIOUS DISEASES AND INTERNAL MEDICINE at [place]  =======================================================  Jose G Avilez MD  Diplomates American Board of Internal Medicine and Infectious Diseases  Tel: [phone]      Fax: [phone]  =======================================================    BAILEE COPELAND [id number]    Follow up: covid  remains on bipap      Allergies:  No Known Allergies           REVIEW OF SYSTEMS:  CONSTITUTIONAL:  No Fever or chills  HEENT:   No diplopia or blurred vision.  No earache, sore throat or runny nose.  CARDIOVASCULAR:  No pressure, squeezing, strangling, tightness, heaviness or aching about the chest, neck, axilla or epigastrium.  RESPIRATORY:  No cough, shortness of breath  GASTROINTESTINAL:  No nausea, vomiting or diarrhea.  GENITOURINARY:  No dysuria, frequency or urgency. No Blood in urine  MUSCULOSKELETAL:  no joint aches, no muscle pain  SKIN:  No change in skin, hair or nails.  NEUROLOGIC:  No Headaches, seizures or weakness.  PSYCHIATRIC:  No disorder of thought or mood.  ENDOCRINE:  No heat or cold intolerance  HEMATOLOGICAL:  No easy bruising or bleeding.       Physical Exam:  GEN: NAD, on bipap, on precedex but arousable  HEENT: normocephalic and atraumatic. EOMI. PERRL.  Anicteric   NECK: Supple.   LUNGS: Clear to auscultation.  HEART: Regular rate and rhythm without murmur.  ABDOMEN: Soft, nontender, and nondistended.  Positive bowel sounds.    : No CVA tenderness  EXTREMITIES: Without any edema.  MSK: no joint swelling  NEUROLOGIC: Cranial nerves II through XII are grossly intact. No focal deficits  PSYCHIATRIC: Appropriate affect .  SKIN: No Rash      Vitals:    T(F): 97.6 (24 Apr 2021 08:11), Max: 97.9 (23 Apr 2021 22:00)  HR: 66 (24 Apr 2021 14:00)  BP: 122/82 (24 Apr 2021 14:00)  RR: 24 (24 Apr 2021 14:00)  SpO2: 92% (24 Apr 2021 14:00) (83% - 96%)  temp max in last 48H T(F): , Max: 99.9 (04-23-21 @ 08:23)    Current Antibiotics:  remdesivir  IVPB 100 milliGRAM(s) IV Intermittent every 24 hours    Other medications:  aspirin enteric coated 81 milliGRAM(s) Oral daily  cholecalciferol 2000 Unit(s) Oral daily  clonazePAM  Tablet 0.5 milliGRAM(s) Oral every 8 hours  dexMEDEtomidine Infusion 0.3 MICROgram(s)/kG/Hr IV Continuous <Continuous>  enoxaparin Injectable 80 milliGRAM(s) SubCutaneous two times a day  methylPREDNISolone sodium succinate Injectable 40 milliGRAM(s) IV Push every 8 hours  pantoprazole  Injectable 40 milliGRAM(s) IV Push daily                            13.2   4.02  )-----------( 291      ( 24 Apr 2021 04:42 )             40.8     04-24    137  |  100  |  34.0<H>  ----------------------------<  200<H>  4.9   |  25.0  |  0.54    Ca    8.4<L>      24 Apr 2021 04:42  Phos  3.7     04-24  Mg     2.3     04-24    TPro  6.2<L>  /  Alb  2.5<L>  /  TBili  0.6  /  DBili  x   /  AST  37  /  ALT  47<H>  /  AlkPhos  101  04-24    RECENT CULTURES:  04-19 @ 18:14 .Blood Blood-Peripheral     No growth at 48 hours        04-16 @ 18:27          NotDete      WBC Count: 4.02 K/uL (04-24-21 @ 04:42)  WBC Count: 7.73 K/uL (04-23-21 @ 06:56)  WBC Count: 6.25 K/uL (04-22-21 @ 07:11)  WBC Count: 6.41 K/uL (04-21-21 @ 11:00)  WBC Count: 6.57 K/uL (04-20-21 @ 05:43)    Creatinine, Serum: 0.54 mg/dL (04-24-21 @ 04:42)  Creatinine, Serum: 0.63 mg/dL (04-23-21 @ 06:56)  Creatinine, Serum: 0.76 mg/dL (04-22-21 @ 07:08)  Creatinine, Serum: 0.78 mg/dL (04-21-21 @ 11:00)  Creatinine, Serum: 0.85 mg/dL (04-20-21 @ 05:41)    C-Reactive Protein, Serum: 154 mg/L (04-24-21 @ 04:43)  C-Reactive Protein, Serum: 134 mg/L (04-23-21 @ 06:56)  C-Reactive Protein, Serum: 128 mg/L (04-22-21 @ 07:09)  C-Reactive Protein, Serum: 174 mg/L (04-21-21 @ 11:00)  C-Reactive Protein, Serum: 211 mg/L (04-20-21 @ 05:41)  C-Reactive Protein, Serum: >422 mg/L (04-16-21 @ 18:00)    Ferritin, Serum: 2343 ng/mL (04-24-21 @ 04:43)  Ferritin, Serum: 1279 ng/mL (04-23-21 @ 06:56)  Ferritin, Serum: 1254 ng/mL (04-22-21 @ 07:09)  Ferritin, Serum: 1328 ng/mL (04-21-21 @ 11:00)  Ferritin, Serum: 1075 ng/mL (04-20-21 @ 05:41)  Ferritin, Serum: 829 ng/mL (04-16-21 @ 18:00)      Procalcitonin, Serum: 0.67 ng/mL (04-19-21 @ 18:07)  Procalcitonin, Serum: 5.93 ng/mL (04-16-21 @ 18:00)     Rapid RVP Result: NotDetec (04-16-21 @ 18:27)  SARS-CoV-2: Detected (04-16-21 @ 18:27)

## 2021-04-25 NOTE — PROGRESS NOTE ADULT - ASSESSMENT
66 yo male admitted on 4/16 with COVID19 viral pneumonia, now with acute respiratory failure requiring NIV, DVT.  Transferred to MICU service on 4/19.    COVID19  - s/p Actemra on 4/19  - On steroids - Medrol 40mg IV Q8H  - Remdesivir  - NIV PRN + QHS alternating with HFNC during day - will continue to try on HFNC during the day  - Monitor O2 requiremetns  - Morphine 2mg IV Q4H PRN for dyspnea  - Tylenol PRN fevers    Agitation  - On Precedex gtt  - Klonopin 0.5mg PO Q8H    DVT   - Full dose anticoagulation with Lovenox 80mg BID  - D-dimer trending down  - CTPA (4/21) negative for PE    Overall prognosis guarded.       Omero Rivera M.D.  Pulmonary & Critical Care Medicine  [facility]  Pulmonary and Sleep Medicine at [place]  [address]  [place] [address]  T: [phone]  F: [phone]   64 yo male admitted on 4/16 with COVID19 viral pneumonia, now with acute respiratory failure requiring NIV, DVT.  Transferred to MICU service on 4/19.    COVID19  - s/p Actemra on 4/19  - On steroids - Medrol 40mg IV Q8H  - Remdesivir  - NIV PRN + QHS alternating with HFNC during day - will continue to try on HFNC during the day  - Monitor O2 requiremetns  - Morphine 2mg IV Q4H PRN for dyspnea  - Tylenol PRN fevers    Agitation  - On Precedex gtt  - Klonopin 0.5mg PO Q8H    DVT   - Full dose anticoagulation with Lovenox 80mg BID  - D-dimer trending down  - CTPA (4/21) negative for PE    Overall prognosis guarded.     Attempted to call sister Bina, however no answer.       Omero Rivera M.D.  Pulmonary & Critical Care Medicine  [facility]  Pulmonary and Sleep Medicine at [place]  [address]  [place] [address]  T: [phone]  F: [phone]

## 2021-04-25 NOTE — PROGRESS NOTE ADULT - SUBJECTIVE AND OBJECTIVE BOX
Patient is a 65y old  Male who presents with a chief complaint of covid19, hypoxia (24 Apr 2021 20:49)      BRIEF HOSPITAL COURSE:   65M no significant PMH who p/w worsening SOB, fevers, chills, headache, f/w hypoxic respiratory failure 2/2 COVID-19 PNA. His PCP tried to get him to come in for antibody infusion, but when arrived found to be hypoxic to 60's and ill appearing and was brought to ER. Required 100% NRB in the ED and then transitioned to 100% HFNC. Received Actemra on 4/19, started on steroids, full dose anticoagulation, and alternates between BPAP and HFNC.     PAST MEDICAL & SURGICAL HISTORY:  No pertinent past medical history    S/P shoulder surgery          Medications:  remdesivir  IVPB 100 milliGRAM(s) IV Intermittent every 24 hours      ALBUTerol    90 MICROgram(s) HFA Inhaler 2 Puff(s) Inhalation every 6 hours PRN    acetaminophen   Tablet .. 650 milliGRAM(s) Oral every 6 hours PRN  clonazePAM  Tablet 0.5 milliGRAM(s) Oral every 8 hours  dexMEDEtomidine Infusion 0.3 MICROgram(s)/kG/Hr IV Continuous <Continuous>  LORazepam   Injectable 0.5 milliGRAM(s) IV Push three times a day PRN  melatonin 5 milliGRAM(s) Oral at bedtime PRN  morphine  - Injectable 2 milliGRAM(s) IV Push every 4 hours PRN      aspirin enteric coated 81 milliGRAM(s) Oral daily  enoxaparin Injectable 80 milliGRAM(s) SubCutaneous two times a day    pantoprazole  Injectable 40 milliGRAM(s) IV Push daily      methylPREDNISolone sodium succinate Injectable 40 milliGRAM(s) IV Push every 8 hours    cholecalciferol 2000 Unit(s) Oral daily                ICU Vital Signs Last 24 Hrs  T(C): 37.2 (25 Apr 2021 12:43), Max: 37.2 (25 Apr 2021 08:35)  T(F): 98.9 (25 Apr 2021 12:43), Max: 98.9 (25 Apr 2021 08:35)  HR: 67 (25 Apr 2021 13:00) (61 - 72)  BP: 121/78 (25 Apr 2021 13:00) (113/77 - 134/91)  BP(mean): 88 (25 Apr 2021 13:00) (85 - 101)  ABP: --  ABP(mean): --  RR: 26 (25 Apr 2021 13:00) (18 - 27)  SpO2: 91% (25 Apr 2021 13:00) (90% - 93%)          I&O's Detail    24 Apr 2021 07:01  -  25 Apr 2021 07:00  --------------------------------------------------------  IN:    Dexmedetomidine: 250 mL  Total IN: 250 mL    OUT:    Voided (mL): 1150 mL  Total OUT: 1150 mL    Total NET: -900 mL      25 Apr 2021 07:01  -  25 Apr 2021 14:05  --------------------------------------------------------  IN:    Dexmedetomidine: 49.2 mL  Total IN: 49.2 mL    OUT:    Voided (mL): 200 mL  Total OUT: 200 mL    Total NET: -150.8 mL            LABS:                        13.5   6.54  )-----------( 313      ( 25 Apr 2021 04:52 )             42.2     04-25    137  |  101  |  38.0<H>  ----------------------------<  147<H>  4.9   |  22.0  |  0.55    Ca    8.5<L>      25 Apr 2021 04:52  Phos  3.6     04-25  Mg     2.4     04-25    TPro  6.0<L>  /  Alb  2.3<L>  /  TBili  0.5  /  DBili  x   /  AST  53<H>  /  ALT  69<H>  /  AlkPhos  103  04-25          CAPILLARY BLOOD GLUCOSE        PT/INR - ( 25 Apr 2021 04:52 )   PT: 15.8 sec;   INR: 1.38 ratio             CULTURES:  Culture Results:   No growth at 5 days. (04-19 @ 18:14)      Physical Examination:  GENERAL: In NAD   HEENT: NC/AT  NECK: Supple  PULM: On BPAP, with coarse mechanical breath sounds B/L  CVS: +S1, S2  ABD: Soft, non-tender  EXT: No pedal edema  SKIN: Warm and well perfused, no rashes noted.  NEURO: Grossly non-focal    DEVICES:     RADIOLOGY:   < from: CT Angio Chest w/ IV Cont (04.21.21 @ 18:28) >  EXAM:  CT ANGIO CHEST (W)AW IC                          PROCEDURE DATE:  04/21/2021          INTERPRETATION:  CLINICAL INFORMATION: COVID-19 positive with deep vein thrombosis    COMPARISON: Same day chest x-ray, chest CT 3/29/2019    CONTRAST/COMPLICATIONS:  IV Contrast: Omnipaque 350  59 cc administered   41 cc discarded  Oral Contrast: NONE  Complications: None reported at time of study completion    PROCEDURE:  CT Angiography of the Chest.  Sagittal and coronal reformats were performed as well as 3D (MIP) reconstructions.    FINDINGS:    PULMONARY ARTERIES: No pulmonary embolism to the segmental branches. Limited visualization of subsegmental branches secondary to respiratory motion.    LUNGS AND LARGE AIRWAYS: The central airways are patent. Bilateral groundglass opacities compatible with COVID-19 pneumonia. Elevated left hemidiaphragm with left basilar atelectasis.  PLEURA: No pleural abnormality.  VESSELS: Normal caliber aorta.  HEART: Normal heart size. No pericardial effusion.  MEDIASTINUM AND MACHELLE: No adenopathy.  CHEST WALL AND LOWER NECK: No masses.  VISUALIZED UPPER ABDOMEN: Limited visualization is unremarkable.  BONES: No acute bony abnormality.    IMPRESSION:  *  No pulmonary embolism to the segmental branches. Limited visualization of subsegmental branches secondary to respiratory motion.  *  Bilateral groundglass opacities compatible with COVID-19 pneumonia. Elevated left hemidiaphragm with left basilar atelectasis.    < end of copied text >

## 2021-04-26 NOTE — PROGRESS NOTE ADULT - SUBJECTIVE AND OBJECTIVE BOX
[facility]  INFECTIOUS DISEASES AND INTERNAL MEDICINE at [facility]  =======================================================  Jose G Avilez MD  Diplomates American Board of Internal Medicine and Infectious Diseases  Tel: [phone]      Fax: [phone]  =======================================================    BAILEE COPELAND [id number]    Follow up: COVID 19     On BIPAP this AM  s/p Actemra 4/20      Allergies:  No Known Allergies      REVIEW OF SYSTEMS:  CONSTITUTIONAL:  No Fever or chills  HEENT:  No diplopia or blurred vision.  No earache, sore throat or runny nose.  CARDIOVASCULAR:  No pressure, squeezing, strangling, tightness, heaviness or aching about the chest, neck, axilla or epigastrium.  RESPIRATORY:  + cough, + shortness of breath  GASTROINTESTINAL:  No nausea, vomiting or diarrhea.  GENITOURINARY:  No dysuria, frequency or urgency.   MUSCULOSKELETAL:  no joint aches, no muscle pain  SKIN:  No change in skin, hair or nails.  NEUROLOGIC:  No Headaches, seizures   PSYCHIATRIC:  No disorder of thought or mood.  ENDOCRINE:  No heat or cold intolerance  HEMATOLOGICAL:  No easy bruising or bleeding.       Physical Exam:  GEN: Mild respiratory distress   HEENT: normocephalic and atraumatic. EOMI. PERRL.  Anicteric  NECK: Supple.   LUNGS: Coarse BS B/L  HEART: Regular rate and rhythm   ABDOMEN: Soft, nontender, and nondistended.  Positive bowel sounds.    : No CVA tenderness  EXTREMITIES: Without any edema.  MSK: No joint swelling  NEUROLOGIC: No Focal Deficits  PSYCHIATRIC: Appropriate affect .  SKIN: No Rash      Vitals:  T(F): 98.2 (26 Apr 2021 09:08), Max: 99.2 (25 Apr 2021 16:41)  HR: 93 (26 Apr 2021 11:00)  BP: 130/71 (26 Apr 2021 11:00)  RR: 25 (26 Apr 2021 11:00)  SpO2: 90% (26 Apr 2021 11:00) (88% - 96%)  temp max in last 48H T(F): , Max: 99.2 (04-25-21 @ 16:41)    Current Antibiotics:    Other medications:  aspirin enteric coated 81 milliGRAM(s) Oral daily  cholecalciferol 2000 Unit(s) Oral daily  clonazePAM  Tablet 0.5 milliGRAM(s) Oral every 8 hours  dexMEDEtomidine Infusion 0.3 MICROgram(s)/kG/Hr IV Continuous <Continuous>  enoxaparin Injectable 80 milliGRAM(s) SubCutaneous two times a day  methylPREDNISolone sodium succinate Injectable 40 milliGRAM(s) IV Push every 8 hours  pantoprazole  Injectable 40 milliGRAM(s) IV Push daily                 14.0   6.58  )-----------( 308      ( 26 Apr 2021 04:46 )             43.7     04-26    136  |  100  |  35.0<H>  ----------------------------<  149<H>  4.9   |  25.0  |  0.53    Ca    8.5<L>      26 Apr 2021 04:46  Phos  3.7     04-26  Mg     2.5     04-26    TPro  5.9<L>  /  Alb  2.5<L>  /  TBili  0.5  /  DBili  x   /  AST  28  /  ALT  55<H>  /  AlkPhos  98  04-26    RECENT CULTURES:  04-19 @ 18:14 .Blood Blood-Peripheral     No growth at 5 days.      WBC Count: 6.58 K/uL (04-26-21 @ 04:46)  WBC Count: 6.54 K/uL (04-25-21 @ 04:52)  WBC Count: 4.02 K/uL (04-24-21 @ 04:42)  WBC Count: 7.73 K/uL (04-23-21 @ 06:56)  WBC Count: 6.25 K/uL (04-22-21 @ 07:11)    Creatinine, Serum: 0.53 mg/dL (04-26-21 @ 04:46)  Creatinine, Serum: 0.55 mg/dL (04-25-21 @ 04:52)  Creatinine, Serum: 0.54 mg/dL (04-24-21 @ 04:42)  Creatinine, Serum: 0.63 mg/dL (04-23-21 @ 06:56)  Creatinine, Serum: 0.76 mg/dL (04-22-21 @ 07:08)    C-Reactive Protein, Serum: 40 mg/L (04-26-21 @ 04:46)  C-Reactive Protein, Serum: 66 mg/L (04-25-21 @ 04:53)  C-Reactive Protein, Serum: 154 mg/L (04-24-21 @ 04:43)  C-Reactive Protein, Serum: 134 mg/L (04-23-21 @ 06:56)  C-Reactive Protein, Serum: 128 mg/L (04-22-21 @ 07:09)  C-Reactive Protein, Serum: 174 mg/L (04-21-21 @ 11:00)  C-Reactive Protein, Serum: 211 mg/L (04-20-21 @ 05:41)  C-Reactive Protein, Serum: >422 mg/L (04-16-21 @ 18:00)    Ferritin, Serum: 1177 ng/mL (04-26-21 @ 04:46)  Ferritin, Serum: 1326 ng/mL (04-25-21 @ 04:53)  Ferritin, Serum: 2343 ng/mL (04-24-21 @ 04:43)  Ferritin, Serum: 1279 ng/mL (04-23-21 @ 06:56)  Ferritin, Serum: 1254 ng/mL (04-22-21 @ 07:09)  Ferritin, Serum: 1328 ng/mL (04-21-21 @ 11:00)  Ferritin, Serum: 1075 ng/mL (04-20-21 @ 05:41)  Ferritin, Serum: 829 ng/mL (04-16-21 @ 18:00)    Procalcitonin, Serum: 0.67 ng/mL (04-19-21 @ 18:07)  Procalcitonin, Serum: 5.93 ng/mL (04-16-21 @ 18:00)     Rapid RVP Result: NotDetec (04-16-21 @ 18:27)  SARS-CoV-2: Detected (04-16-21 @ 18:27)      < from: CT Angio Chest w/ IV Cont (04.21.21 @ 18:28) >  EXAM:  CT ANGIO CHEST (W)AW IC                          PROCEDURE DATE:  04/21/2021          INTERPRETATION:  CLINICAL INFORMATION: COVID-19 positive with deep vein thrombosis    COMPARISON: Same day chest x-ray, chest CT 3/29/2019    CONTRAST/COMPLICATIONS:  IV Contrast: Omnipaque 350  59 cc administered   41 cc discarded  Oral Contrast: NONE  Complications: None reported at time of study completion    PROCEDURE:  CT Angiography of the Chest.  Sagittal and coronal reformats were performed as well as 3D (MIP) reconstructions.    FINDINGS:    PULMONARY ARTERIES: No pulmonary embolism to the segmental branches. Limited visualization of subsegmental branches secondary to respiratory motion.    LUNGS AND LARGE AIRWAYS: The central airways are patent. Bilateral groundglass opacities compatible with COVID-19 pneumonia. Elevated left hemidiaphragm with left basilar atelectasis.  PLEURA: No pleural abnormality.  VESSELS: Normal caliber aorta.  HEART: Normal heart size. No pericardial effusion.  MEDIASTINUM AND MACHELLE: No adenopathy.  CHEST WALL AND LOWER NECK: No masses.  VISUALIZED UPPER ABDOMEN: Limited visualization is unremarkable.  BONES: No acute bony abnormality.    IMPRESSION:  *  No pulmonary embolism to the segmental branches. Limited visualization of subsegmental branches secondary to respiratory motion.  *  Bilateral groundglass opacities compatible with COVID-19 pneumonia. Elevated left hemidiaphragm with left basilar atelectasis.    < end of copied text >

## 2021-04-26 NOTE — PROGRESS NOTE ADULT - SUBJECTIVE AND OBJECTIVE BOX
[facility]    Greg    Patient is a 65y old  Male who presents with a chief complaint of covid19, hypoxia (25 Apr 2021 14:04)      BRIEF HOSPITAL COURSE: *65M no significant PMH who p/w worsening SOB, fevers, chills, headache, f/w hypoxic respiratory failure 2/2 COVID-19 PNA. His PCP tried to get him to come in for antibody infusion, but when arrived found to be hypoxic to 60's and ill appearing and was brought to ER. Required 100% NRB in the ED and then transitioned to 100% HFNC. Received Actemra on 4/19, started on steroids, full dose anticoagulation, and alternates between BPAP and HFNC.   **    Events last 24 hours: **  alert and conversant  tolerated  some po feeds on high flow  however remains with sats  80's  thus back on avap 100 %   sats 92 %  max 30/min 15 epap 14   rate 14,  patient  700cc rate 25 *    PAST MEDICAL & SURGICAL HISTORY:  No pertinent past medical history    S/P shoulder surgery      Allergies    No Known Allergies    Intolerances      FAMILY HISTORY:  No pertinent family history in first degree relatives        Family history otherwise noncontributory.    Social History: ***  Review of Systems:    ALL OTHER REVIEW OF SYSTEMS EXCEPT PER HPI NEGATIVE.      Medications:      ALBUTerol    90 MICROgram(s) HFA Inhaler 2 Puff(s) Inhalation every 6 hours PRN    acetaminophen   Tablet .. 650 milliGRAM(s) Oral every 6 hours PRN  clonazePAM  Tablet 0.5 milliGRAM(s) Oral every 8 hours  dexMEDEtomidine Infusion 0.3 MICROgram(s)/kG/Hr IV Continuous <Continuous>  LORazepam   Injectable 0.5 milliGRAM(s) IV Push three times a day PRN  melatonin 5 milliGRAM(s) Oral at bedtime PRN  morphine  - Injectable 2 milliGRAM(s) IV Push every 4 hours PRN      aspirin enteric coated 81 milliGRAM(s) Oral daily  enoxaparin Injectable 80 milliGRAM(s) SubCutaneous two times a day    pantoprazole  Injectable 40 milliGRAM(s) IV Push daily      methylPREDNISolone sodium succinate Injectable 40 milliGRAM(s) IV Push every 8 hours    cholecalciferol 2000 Unit(s) Oral daily                ICU Vital Signs Last 24 Hrs  T(C): 36.6 (26 Apr 2021 11:39), Max: 37.3 (25 Apr 2021 16:41)  T(F): 97.9 (26 Apr 2021 11:39), Max: 99.2 (25 Apr 2021 16:41)  HR: 79 (26 Apr 2021 12:42) (61 - 93)  BP: 114/78 (26 Apr 2021 12:00) (107/75 - 134/85)  BP(mean): 85 (26 Apr 2021 12:00) (80 - 111)  ABP: --  ABP(mean): --  RR: 26 (26 Apr 2021 12:00) (18 - 31)  SpO2: 92% (26 Apr 2021 12:42) (88% - 96%)    Vital Signs Last 24 Hrs  T(C): 36.6 (26 Apr 2021 11:39), Max: 37.3 (25 Apr 2021 16:41)  T(F): 97.9 (26 Apr 2021 11:39), Max: 99.2 (25 Apr 2021 16:41)  HR: 79 (26 Apr 2021 12:42) (61 - 93)  BP: 114/78 (26 Apr 2021 12:00) (107/75 - 134/85)  BP(mean): 85 (26 Apr 2021 12:00) (80 - 111)  RR: 26 (26 Apr 2021 12:00) (18 - 31)  SpO2: 92% (26 Apr 2021 12:42) (88% - 96%)    ABG - ( 26 Apr 2021 05:00 )  pH, Arterial: 7.44  pH, Blood: x     /  pCO2: 40    /  pO2: 70    / HCO3: 27    / Base Excess: 2.8   /  SaO2: 94                  I&O's Detail    25 Apr 2021 07:01  -  26 Apr 2021 07:00  --------------------------------------------------------  IN:    Dexmedetomidine: 86.4 mL  Total IN: 86.4 mL    OUT:    Voided (mL): 850 mL  Total OUT: 850 mL    Total NET: -763.6 mL            LABS:                        14.0   6.58  )-----------( 308      ( 26 Apr 2021 04:46 )             43.7     04-26    136  |  100  |  35.0<H>  ----------------------------<  149<H>  4.9   |  25.0  |  0.53    Ca    8.5<L>      26 Apr 2021 04:46  Phos  3.7     04-26  Mg     2.5     04-26    TPro  5.9<L>  /  Alb  2.5<L>  /  TBili  0.5  /  DBili  x   /  AST  28  /  ALT  55<H>  /  AlkPhos  98  04-26          CAPILLARY BLOOD GLUCOSE        PT/INR - ( 25 Apr 2021 04:52 )   PT: 15.8 sec;   INR: 1.38 ratio             CULTURES:  Culture Results:   No growth at 5 days. (04-19 @ 18:14)      Physical Examination:    GENERAL:   frail male no distress on avap     EYES: Pupils equal, reactive to light.  Symmetric.    EARS, NOSE, THROAT: Normal; supple neck, no lymphadenopathy; trachea midline mask in place     PULM: Clear to auscultation bilaterally, no significant sputum production    CVS: Regular rate and rhythm, no murmurs, rubs, or gallops    GI: Soft, nondistended, nontender, normoactive bowel sounds, no masses, no guarding    EXTREMITIES: No edema    SKIN: Warm and well perfused, no rashes noted.    NEURO: Alert, oriented, interactive, nonfocal    DEVICES:     RADIOLOGY: **IMPRESSION:  *  No pulmonary embolism to the segmental branches. Limited visualization of subsegmental branches secondary to respiratory motion.  *  Bilateral groundglass opacities compatible with COVID-19 pneumonia. Elevated left hemidiaphragm with left basilar atelectasis.    KHUSHBOO KRISHNAMURTHY MD; Attending Radiologist  This document has been electronically signed. Apr 21 2021  8:25PM*

## 2021-04-26 NOTE — PROGRESS NOTE ADULT - SUBJECTIVE AND OBJECTIVE BOX
micu    -called  sister  Zelda Curran   updated to current critical condition  --- many questions--specific questions on vitals-numbers-  type of oxygen masks - answered to apparent satisfaction

## 2021-04-26 NOTE — PROGRESS NOTE ADULT - ASSESSMENT
4 yo male admitted on 4/16 with COVID19 viral pneumonia, now with acute respiratory failure requiring NIV, DVT.  Transferred to MICU service on 4/19.      1- acute  respiratory failure   2- covid   pneumonia  3-4/20/21 b/l  DVT  - right femoral vein left posterior tibial vein   CTPA 4/21 neg    neurologic  alert  respiratory     100 %  avap  add symbicort  bid    albuterol MDI     ID remdesivir    solu- medrol     heme  full dose lovenox  80 BID    GI  pepcid  feeds po as tolerated    Critical  care TIme  > 50 minutes were spent assessing the patient's presenting problems of acute illness that pose a high probability   of life threatening  deterioration or end organ damage / dysfunction.  Medical desicion making includes initiation/ continuation of plan or care review data/labwork/  radiographic study,  direct patient  care bedside ,  discussions with  consultants regarding care,     evaluation  and  interpretation of vital signs,  any necessary ventilator management,   NIV or BIPAP changes  or initiation,    discussions with multidisciplinary team,  am or pm rounds,  discussions of goals of care with patient and family  all non-inclusive of procedures.

## 2021-04-26 NOTE — PROGRESS NOTE ADULT - ASSESSMENT
65y  Male with no PMH comes to the ER with worsening shortness of breath. Patient states that has been having fevers, chills and headache for the last 8-10 days. Both of his sons who are in school recently were quarantining from school due to covid. He states the last few days he's been feeling especially weak and more short of breath. His PCP tried to get him to come in for antibody infusion, but when arrived found to be hypoxic to 60's and ill appearing and was brought to ER. In the ER patient was initially on 100% NRB and followed by 100% HFNC. Started on Remdesivir and Dexamethasone. Now on BIPAP.      Acute Hypoxic respiratory failure  COVID-19 infection  B/L Pneumonia   cough  fevers  shortness of breath      - COVID 19 PCR positive   - Blood cultures no growth   - CT Chest reporting PNA    - Continue supportive care measures  - continue to trend inflammatory markers.   - trend CBC with diff, CMP,  CRP, Ferritin,  procalcitonin q 48hours  - Avoid antibiotics unless there is a concern for a bacterial infection  - May consider vitamin C, thiamine and zinc (note lack of evidence to support benefit with COVID 19)  - Discussed Remdesivir with the patient.   - Completed 10 days of Remdesivir   - s/p Actemra 4/20  - Dexamethasone 6mg Daily   - follow up all outstanding cultures  - Trend Fever  - Trend Leukocytosis      Will follow      D/W MICU team

## 2021-04-27 NOTE — PROGRESS NOTE ADULT - SUBJECTIVE AND OBJECTIVE BOX
MICU    -called  Bushra  sister -  although    sisters   were spoken to  yesterday and they relayed the information to  her she prefers to be called first  -- updated  to critical  current condition   -  concerned about him eating and getting oob to chair - updated that he is still hypoxic and requiring  high levels of oxygen  thus critical   and when he is stable enough we will do this --  all questions answered

## 2021-04-27 NOTE — PROGRESS NOTE ADULT - SUBJECTIVE AND OBJECTIVE BOX
[facility]    Greg    Patient is a 65y old  Male who presents with a chief complaint of covid19, hypoxia (26 Apr 2021 17:47)      BRIEF HOSPITAL COURSE: *65M no significant PMH who p/w worsening SOB, fevers, chills, headache, f/w hypoxic respiratory failure 2/2 COVID-19 PNA. His PCP tried to get him to come in for antibody infusion, but when arrived found to be hypoxic to 60's and ill appearing and was brought to ER. Required 100% NRB in the ED and then transitioned to 100% HFNC. Received Actemra on 4/19, started on steroids, full dose anticoagulation, and alternates between BPAP and HFNC.   **    Events last 24 hours: *  alert and conversant   tolerated  po    avqp  30/15  EPAP 14    100  %       rate 25  tolerated only  20 minutes high flow 100 %    precedex **    PAST MEDICAL & SURGICAL HISTORY:  No pertinent past medical history    S/P shoulder surgery          Medications:      ALBUTerol    90 MICROgram(s) HFA Inhaler 2 Puff(s) Inhalation every 6 hours PRN    acetaminophen   Tablet .. 650 milliGRAM(s) Oral every 6 hours PRN  clonazePAM  Tablet 0.5 milliGRAM(s) Oral every 8 hours  dexMEDEtomidine Infusion 0.3 MICROgram(s)/kG/Hr IV Continuous <Continuous>  melatonin 5 milliGRAM(s) Oral at bedtime PRN  morphine  - Injectable 2 milliGRAM(s) IV Push every 4 hours PRN      aspirin enteric coated 81 milliGRAM(s) Oral daily  enoxaparin Injectable 80 milliGRAM(s) SubCutaneous two times a day    pantoprazole  Injectable 40 milliGRAM(s) IV Push daily      methylPREDNISolone sodium succinate Injectable 40 milliGRAM(s) IV Push every 8 hours    cholecalciferol 2000 Unit(s) Oral daily  multivitamin 1 Tablet(s) Oral daily                ICU Vital Signs Last 24 Hrs  T(C): 36.5 (27 Apr 2021 09:42), Max: 37.1 (26 Apr 2021 15:43)  T(F): 97.7 (27 Apr 2021 09:42), Max: 98.7 (26 Apr 2021 15:43)  HR: 80 (27 Apr 2021 13:00) (62 - 91)  BP: 114/78 (27 Apr 2021 13:00) (110/81 - 133/95)  BP(mean): 87 (27 Apr 2021 13:00) (87 - 105)  ABP: --  ABP(mean): --  RR: 19 (27 Apr 2021 13:00) (17 - 29)  SpO2: 95% (27 Apr 2021 13:00) (88% - 96%)      ABG - ( 26 Apr 2021 05:00 )  pH, Arterial: 7.44  pH, Blood: x     /  pCO2: 40    /  pO2: 70    / HCO3: 27    / Base Excess: 2.8   /  SaO2: 94                  I&O's Detail    26 Apr 2021 07:01  -  27 Apr 2021 07:00  --------------------------------------------------------  IN:    Dexmedetomidine: 142.6 mL  Total IN: 142.6 mL    OUT:    Voided (mL): 1120 mL  Total OUT: 1120 mL    Total NET: -977.4 mL      27 Apr 2021 07:01  -  27 Apr 2021 13:31  --------------------------------------------------------  IN:    Dexmedetomidine: 31 mL  Total IN: 31 mL    OUT:  Total OUT: 0 mL    Total NET: 31 mL            LABS:                        14.4   7.49  )-----------( 349      ( 27 Apr 2021 05:06 )             44.9     04-27    138  |  101  |  34.0<H>  ----------------------------<  155<H>  4.9   |  25.0  |  0.52    Ca    8.7      27 Apr 2021 05:06  Phos  3.3     04-27  Mg     2.3     04-27    TPro  5.8<L>  /  Alb  2.5<L>  /  TBili  0.5  /  DBili  x   /  AST  21  /  ALT  41<H>  /  AlkPhos  91  04-27          CAPILLARY BLOOD GLUCOSE            CULTURES:      Physical Examination:    General:   frail male  in bed avap  alert   HEENT: Pupils equal, reactive to light.  Symmetric.    PULM: Clear to auscultation bilaterally, no significant sputum production    NECK: Supple, no lymphadenopathy, trachea midline    CVS: Regular rate and rhythm, no murmurs, rubs, or gallops    ABD: Soft, nondistended, nontender, normoactive bowel sounds, no masses    EXT: No edema, nontender    SKIN: Warm and well perfused, no rashes noted.    NEURO: Alert, oriented, interactive, nonfocal    DEVICES:     RADIOLOGY: ***

## 2021-04-27 NOTE — CHART NOTE - NSCHARTNOTEFT_GEN_A_CORE
Source: Patient [ ]  Family [ ]   other [x ]    Current Diet: Diet, Regular (04-16-21 @ 22:48)    PO intake: Varies    Current Weight:   (4/17) 180.5 lbs  -Obtain daily wts, continue to monitor. No edema noted.     Pertinent Medications: MEDICATIONS  (STANDING):  aspirin enteric coated 81 milliGRAM(s) Oral daily  cholecalciferol 2000 Unit(s) Oral daily  clonazePAM  Tablet 0.5 milliGRAM(s) Oral every 8 hours  dexMEDEtomidine Infusion 0.3 MICROgram(s)/kG/Hr (6.16 mL/Hr) IV Continuous <Continuous>  enoxaparin Injectable 80 milliGRAM(s) SubCutaneous two times a day  methylPREDNISolone sodium succinate Injectable 40 milliGRAM(s) IV Push every 8 hours  pantoprazole  Injectable 40 milliGRAM(s) IV Push daily    MEDICATIONS  (PRN):  acetaminophen   Tablet .. 650 milliGRAM(s) Oral every 6 hours PRN Temp greater or equal to 38C (100.4F), Mild Pain (1 - 3), Moderate Pain (4 - 6)  ALBUTerol    90 MICROgram(s) HFA Inhaler 2 Puff(s) Inhalation every 6 hours PRN Shortness of Breath and/or Wheezing  melatonin 5 milliGRAM(s) Oral at bedtime PRN Insomnia  morphine  - Injectable 2 milliGRAM(s) IV Push every 4 hours PRN dyspnea    Pertinent Labs: CBC Full  -  ( 27 Apr 2021 05:06 )  WBC Count : 7.49 K/uL  RBC Count : 5.03 M/uL  Hemoglobin : 14.4 g/dL  Hematocrit : 44.9 %  Platelet Count - Automated : 349 K/uL  Mean Cell Volume : 89.3 fl  Mean Cell Hemoglobin : 28.6 pg  Mean Cell Hemoglobin Concentration : 32.1 gm/dL  Auto Neutrophil # : x  Auto Lymphocyte # : x  Auto Monocyte # : x  Auto Eosinophil # : x  Auto Basophil # : x  Auto Neutrophil % : x  Auto Lymphocyte % : x  Auto Monocyte % : x  Auto Eosinophil % : x  Auto Basophil % : x  04-27 Na138 mmol/L Glu 155 mg/dL<H> K+ 4.9 mmol/L Cr  0.52 mg/dL BUN 34.0 mg/dL<H> Phos 3.3 mg/dL Alb 2.5 g/dL<L> PAB n/a       Skin: Intact per documentation    Nutrition focused physical exam not conducted - found signs of malnutrition [ ]absent [ ]present    Subcutaneous fat loss: [ ] Orbital fat pads region, [ ]Buccal fat region, [ ]Triceps region,  [ ]Ribs region    Muscle wasting: [ ]Temples region, [ ]Clavicle region, [ ]Shoulder region, [ ]Scapula region, [ ]Interosseous region,  [ ]thigh region, [ ]Calf region    Estimated Needs:   [x ] no change since previous assessment  [ ] recalculated:     Current Nutrition Diagnosis: Pt remains at high nutrition risk secondary to Increased Nutrient Needs related to increased physiological demand for nutrient as evidenced by acute respiratory failure (worsening- requiring BiPAP) due to COVID+. Pt now alternates between BPAP and HFNC. Pt is tolerating po diet, had lunch and dinner yesterday. Prognosis guarded. Pt is at high risk for malnutrition if po intake remains suboptimal, will continue to monitor. RD to remain available.     Recommendations:   1) Add Ensure Enlive TID to optimize po intake and provide an additional 350kcal, 20g protein per serving   2) RX: MVI, Vitamin D. Continue Vitamin D daily.  3) Encourage po intake/HBV protein as feasible  4) Monitor wts and labs    Monitoring and Evaluation:   [x ] PO intake [ x] Tolerance to diet prescription [X] Weights  [X] Follow up per protocol [X] Labs:

## 2021-04-27 NOTE — PROGRESS NOTE ADULT - ASSESSMENT
66 yo male admitted on 4/16 with COVID19 viral pneumonia, now with acute respiratory failure requiring NIV, DVT.  Transferred to MICU service on 4/19.      1- acute  respiratory failure   2- covid   pneumonia  3-4/20/21 b/l  DVT  - right femoral vein left posterior tibial vein   CTPA 4/21 neg    neurologic  alert precedex   clonazepam   ativan  morphine prn     respiratory   trial high flow  few times a day   avap    solu medrol   symbicort   bid  albuterol and       ID monitor   procalcitonin    s/p remdesivir  10 days  completed   s/p   actemra 4/20  - s/p dexamethasone    heme full dose lovenox    gi  feeds      Critical  care TIme  > 35  minutes were spent assessing the patient's presenting problems of acute illness that pose a high probability   of life threatening  deterioration or end organ damage / dysfunction.  Medical decision making includes initiation/ continuation of plan or care review data/labwork/  radiographic study,  direct patient  care bedside ,  discussions with  consultants regarding care,     evaluation  and  interpretation of vital signs,  any necessary ventilator management,   NIV or BIPAP changes  or initiation,    discussions with multidisciplinary team,  am or pm rounds,  discussions of goals of care with patient and family  all non-inclusive of procedures. 64 yo male admitted on 4/16 with COVID19 viral pneumonia, now with acute respiratory failure requiring NIV, DVT.  Transferred to MICU service on 4/19.      1- acute  respiratory failure   2- covid   pneumonia  3-4/20/21 b/l  DVT  - right femoral vein left posterior tibial vein   CTPA 4/21 neg  4-  left hemidiaphragm  elevation on  cxr    ? paralysis     neurologic  alert precedex   clonazepam   ativan  morphine prn     respiratory   trial high flow  few times a day   avap    solu medrol   symbicort   bid  albuterol and       ID monitor   procalcitonin    s/p remdesivir  10 days  completed   s/p   actemra 4/20  - s/p dexamethasone    heme full dose lovenox    gi  feeds      Critical  care TIme  > 35  minutes were spent assessing the patient's presenting problems of acute illness that pose a high probability   of life threatening  deterioration or end organ damage / dysfunction.  Medical decision making includes initiation/ continuation of plan or care review data/labwork/  radiographic study,  direct patient  care bedside ,  discussions with  consultants regarding care,     evaluation  and  interpretation of vital signs,  any necessary ventilator management,   NIV or BIPAP changes  or initiation,    discussions with multidisciplinary team,  am or pm rounds,  discussions of goals of care with patient and family  all non-inclusive of procedures.

## 2021-04-27 NOTE — PROGRESS NOTE ADULT - SUBJECTIVE AND OBJECTIVE BOX
MICU    --  MARILYNN at bedside - sister home health aid - discussed in detail poor prognosis of  acute respiratory failure due to COVID and  left diaphragm  elevation -  ? paralysis unknown time frame -  she was upset  supportive care

## 2021-04-28 NOTE — PROGRESS NOTE ADULT - SUBJECTIVE AND OBJECTIVE BOX
[facility]    Greg    Patient is a 65y old  Male who presents with a chief complaint of covid19, hypoxia (27 Apr 2021 17:38)      BRIEF HOSPITAL COURSE: *65M no significant PMH who p/w worsening SOB, fevers, chills, headache, f/w hypoxic respiratory failure 2/2 COVID-19 PNA. His PCP tried to get him to come in for antibody infusion, but when arrived found to be hypoxic to 60's and ill appearing and was brought to ER. Required 100% NRB in the ED and then transitioned to 100% HFNC. Received Actemra on 4/19, started on steroids, full dose anticoagulation, and alternates between BPAP and HFNC.   **    Events last 24 hours: **alert and conversant  tolerated  high flow  2 hours yesterday   avap 30/15  epap 14       precedex*    PAST MEDICAL & SURGICAL HISTORY:  No pertinent past medical history    S/P shoulder surgery      Allergies    No Known Allergies    Intolerances      FAMILY HISTORY:  No pertinent family history in first degree relatives        Family history otherwise noncontributory.    Social History: ***  Review of Systems:    ALL OTHER REVIEW OF SYSTEMS EXCEPT PER HPI NEGATIVE.      Medications:      ALBUTerol    90 MICROgram(s) HFA Inhaler 2 Puff(s) Inhalation every 6 hours PRN    acetaminophen   Tablet .. 650 milliGRAM(s) Oral every 6 hours PRN  clonazePAM  Tablet 1 milliGRAM(s) Oral every 8 hours  dexMEDEtomidine Infusion 0.3 MICROgram(s)/kG/Hr IV Continuous <Continuous>  melatonin 5 milliGRAM(s) Oral at bedtime PRN  morphine  - Injectable 2 milliGRAM(s) IV Push every 4 hours PRN      aspirin enteric coated 81 milliGRAM(s) Oral daily  enoxaparin Injectable 80 milliGRAM(s) SubCutaneous two times a day    lactulose Syrup 20 Gram(s) Oral once  pantoprazole  Injectable 40 milliGRAM(s) IV Push daily      methylPREDNISolone sodium succinate Injectable 40 milliGRAM(s) IV Push every 8 hours    cholecalciferol 2000 Unit(s) Oral daily  multivitamin 1 Tablet(s) Oral daily                ICU Vital Signs Last 24 Hrs  T(C): 36.2 (28 Apr 2021 11:26), Max: 36.7 (27 Apr 2021 16:00)  T(F): 97.2 (28 Apr 2021 11:26), Max: 98 (27 Apr 2021 16:00)  HR: 83 (28 Apr 2021 08:40) (64 - 91)  BP: 102/84 (28 Apr 2021 06:00) (100/67 - 142/97)  BP(mean): 73 (28 Apr 2021 06:00) (73 - 109)  ABP: --  ABP(mean): --  RR: 20 (28 Apr 2021 06:00) (17 - 27)  SpO2: 93% (28 Apr 2021 08:40) (92% - 96%)    Vital Signs Last 24 Hrs  T(C): 36.2 (28 Apr 2021 11:26), Max: 36.7 (27 Apr 2021 16:00)  T(F): 97.2 (28 Apr 2021 11:26), Max: 98 (27 Apr 2021 16:00)  HR: 83 (28 Apr 2021 08:40) (64 - 91)  BP: 102/84 (28 Apr 2021 06:00) (100/67 - 142/97)  BP(mean): 73 (28 Apr 2021 06:00) (73 - 109)  RR: 20 (28 Apr 2021 06:00) (17 - 27)  SpO2: 93% (28 Apr 2021 08:40) (92% - 96%)        I&O's Detail    27 Apr 2021 07:01  -  28 Apr 2021 07:00  --------------------------------------------------------  IN:    Dexmedetomidine: 74.4 mL  Total IN: 74.4 mL    OUT:    Voided (mL): 800 mL  Total OUT: 800 mL    Total NET: -725.6 mL            LABS:                        14.9   8.99  )-----------( 369      ( 28 Apr 2021 05:30 )             45.8     04-28    136  |  100  |  34.0<H>  ----------------------------<  134<H>  5.1   |  25.0  |  0.52    Ca    8.5<L>      28 Apr 2021 05:30  Phos  3.5     04-28  Mg     2.2     04-28    TPro  5.6<L>  /  Alb  2.5<L>  /  TBili  0.6  /  DBili  x   /  AST  21  /  ALT  31  /  AlkPhos  82  04-28          CAPILLARY BLOOD GLUCOSE            CULTURES:      Physical Examination:    GENERAL:   frail male    no distress   good eye contact    EYES: Pupils equal, reactive to light.  Symmetric.  sclera anicteric     EARS, NOSE, THROAT: Normal; supple neck, no lymphadenopathy; trachea midline    PULM: Clear to auscultation bilaterally,   poor air entry      CVS: Regular rate and rhythm, no murmurs, rubs, or gallops    GI: Soft, nondistended, nontender, normoactive bowel sounds, no masses, no guarding    EXTREMITIES: No edema    SKIN: Warm and well perfused, no rashes noted.    NEURO: Alert, oriented, interactive, nonfocal     DEVICES:     RADIOLOGY: *IMPRESSION:  Improvement in bilateral perihilar interstitial infiltrates    STUART PASCAL DO; Attending Radiologist  This document has been electronically signed. Apr 27 2021  4:12PM**

## 2021-04-28 NOTE — PROGRESS NOTE ADULT - ASSESSMENT
64 yo male admitted on 4/16 with COVID19 viral pneumonia, now with acute respiratory failure requiring NIV, DVT.  Transferred to MICU service on 4/19.      1- acute  respiratory failure   2- covid   pneumonia  3-4/20/21 b/l  DVT  - right femoral vein left posterior tibial vein   CTPA 4/21 neg  4-  left hemidiaphragm  elevation on  cxr    ? paralysis       neurologic   alert  precedex  will increase  clonazepam   ativan morphine prn    respiratory   high flow trial today   sol medrol  symbicort  albuterol,  sniff test once stable    ID s/p remdesivir  10 days  completed   s/p   actemra 4/20  - s/p dexamethasone    Heme  full dose lovenox    GI   feeds PPI    Critical  care TIme  > 35 minutes were spent assessing the patient's presenting problems of acute illness that pose a high probability   of life threatening  deterioration or end organ damage / dysfunction.  Medical decision making includes initiation/ continuation of plan or care review data/labwork/  radiographic study,  direct patient  care bedside ,  discussions with  consultants regarding care,     evaluation  and  interpretation of vital signs,  any necessary ventilator management,   NIV or BIPAP changes  or initiation,    discussions with multidisciplinary team,  am or pm rounds,  discussions of goals of care with patient and family  all non-inclusive of procedures.

## 2021-04-28 NOTE — PROGRESS NOTE ADULT - SUBJECTIVE AND OBJECTIVE BOX
MICU    - called  MARILYNN   --  updated to current clinical   condition     answered all questions to apparent satisfaction

## 2021-04-28 NOTE — PROGRESS NOTE ADULT - SUBJECTIVE AND OBJECTIVE BOX
[facility]  INFECTIOUS DISEASES AND INTERNAL MEDICINE at [place]  =======================================================  Jose G Avilez MD  Diplomates American Board of Internal Medicine and Infectious Diseases  Tel: [phone]      Fax: [phone]  =======================================================    BAILEE COPELAND [id number]    Follow up: COVID 19     On BIPAP this AM  s/p Actemra 4/20      Allergies:  No Known Allergies      REVIEW OF SYSTEMS:  CONSTITUTIONAL:  No Fever or chills  HEENT:  No diplopia or blurred vision.  No earache, sore throat or runny nose.  CARDIOVASCULAR:  No pressure, squeezing, strangling, tightness, heaviness or aching about the chest, neck, axilla or epigastrium.  RESPIRATORY:  + cough, + shortness of breath  GASTROINTESTINAL:  No nausea, vomiting or diarrhea.  GENITOURINARY:  No dysuria, frequency or urgency.   MUSCULOSKELETAL:  no joint aches, no muscle pain  SKIN:  No change in skin, hair or nails.  NEUROLOGIC:  No Headaches, seizures   PSYCHIATRIC:  No disorder of thought or mood.  ENDOCRINE:  No heat or cold intolerance  HEMATOLOGICAL:  No easy bruising or bleeding.       Physical Exam:  GEN: Mild respiratory distress   HEENT: normocephalic and atraumatic. EOMI. PERRL.  Anicteric  NECK: Supple.   LUNGS: Coarse BS B/L  HEART: Regular rate and rhythm   ABDOMEN: Soft, nontender, and nondistended.  Positive bowel sounds.    : No CVA tenderness  EXTREMITIES: Without any edema.  MSK: No joint swelling  NEUROLOGIC: No Focal Deficits  PSYCHIATRIC: Appropriate affect .  SKIN: No Rash      Vitals:  T(F): 98 (28 Apr 2021 04:00), Max: 98 (27 Apr 2021 16:00)  HR: 83 (28 Apr 2021 08:40)  BP: 102/84 (28 Apr 2021 06:00)  RR: 20 (28 Apr 2021 06:00)  SpO2: 93% (28 Apr 2021 08:40) (92% - 96%)  temp max in last 48H T(F): , Max: 98.7 (04-26-21 @ 15:43)      Current Antibiotics:    Other medications:  aspirin enteric coated 81 milliGRAM(s) Oral daily  cholecalciferol 2000 Unit(s) Oral daily  clonazePAM  Tablet 1 milliGRAM(s) Oral every 8 hours  dexMEDEtomidine Infusion 0.3 MICROgram(s)/kG/Hr IV Continuous <Continuous>  enoxaparin Injectable 80 milliGRAM(s) SubCutaneous two times a day  lactulose Syrup 20 Gram(s) Oral once  methylPREDNISolone sodium succinate Injectable 40 milliGRAM(s) IV Push every 8 hours  multivitamin 1 Tablet(s) Oral daily  pantoprazole  Injectable 40 milliGRAM(s) IV Push daily                 14.9   8.99  )-----------( 369      ( 28 Apr 2021 05:30 )             45.8     04-28    136  |  100  |  34.0<H>  ----------------------------<  134<H>  5.1   |  25.0  |  0.52    Ca    8.5<L>      28 Apr 2021 05:30  Phos  3.5     04-28  Mg     2.2     04-28    TPro  5.6<L>  /  Alb  2.5<L>  /  TBili  0.6  /  DBili  x   /  AST  21  /  ALT  31  /  AlkPhos  82  04-28    RECENT CULTURES:  04-19 @ 18:14 .Blood Blood-Peripheral     No growth at 5 days.    04-16 @ 18:27    RVP  NotDetec      WBC Count: 8.99 K/uL (04-28-21 @ 05:30)  WBC Count: 7.49 K/uL (04-27-21 @ 05:06)  WBC Count: 6.58 K/uL (04-26-21 @ 04:46)  WBC Count: 6.54 K/uL (04-25-21 @ 04:52)  WBC Count: 4.02 K/uL (04-24-21 @ 04:42)    Creatinine, Serum: 0.52 mg/dL (04-28-21 @ 05:30)  Creatinine, Serum: 0.52 mg/dL (04-27-21 @ 05:06)  Creatinine, Serum: 0.53 mg/dL (04-26-21 @ 04:46)  Creatinine, Serum: 0.55 mg/dL (04-25-21 @ 04:52)  Creatinine, Serum: 0.54 mg/dL (04-24-21 @ 04:42)    C-Reactive Protein, Serum: 16 mg/L (04-28-21 @ 05:30)  C-Reactive Protein, Serum: 27 mg/L (04-27-21 @ 05:07)  C-Reactive Protein, Serum: 40 mg/L (04-26-21 @ 04:46)  C-Reactive Protein, Serum: 66 mg/L (04-25-21 @ 04:53)  C-Reactive Protein, Serum: 154 mg/L (04-24-21 @ 04:43)  C-Reactive Protein, Serum: 134 mg/L (04-23-21 @ 06:56)  C-Reactive Protein, Serum: 128 mg/L (04-22-21 @ 07:09)  C-Reactive Protein, Serum: 174 mg/L (04-21-21 @ 11:00)  C-Reactive Protein, Serum: 211 mg/L (04-20-21 @ 05:41)  C-Reactive Protein, Serum: >422 mg/L (04-16-21 @ 18:00)    Ferritin, Serum: 1254 ng/mL (04-28-21 @ 05:30)  Ferritin, Serum: 1203 ng/mL (04-27-21 @ 05:07)  Ferritin, Serum: 1177 ng/mL (04-26-21 @ 04:46)  Ferritin, Serum: 1326 ng/mL (04-25-21 @ 04:53)  Ferritin, Serum: 2343 ng/mL (04-24-21 @ 04:43)  Ferritin, Serum: 1279 ng/mL (04-23-21 @ 06:56)  Ferritin, Serum: 1254 ng/mL (04-22-21 @ 07:09)  Ferritin, Serum: 1328 ng/mL (04-21-21 @ 11:00)  Ferritin, Serum: 1075 ng/mL (04-20-21 @ 05:41)  Ferritin, Serum: 829 ng/mL (04-16-21 @ 18:00)    Procalcitonin, Serum: 0.12 ng/mL (04-27-21 @ 05:06)  Procalcitonin, Serum: 0.67 ng/mL (04-19-21 @ 18:07)  Procalcitonin, Serum: 5.93 ng/mL (04-16-21 @ 18:00)     Rapid RVP Result: NotDetec (04-16-21 @ 18:27)  SARS-CoV-2: Detected (04-16-21 @ 18:27)        < from: CT Angio Chest w/ IV Cont (04.21.21 @ 18:28) >  EXAM:  CT ANGIO CHEST (W)AW IC                          PROCEDURE DATE:  04/21/2021      INTERPRETATION:  CLINICAL INFORMATION: COVID-19 positive with deep vein thrombosis    COMPARISON: Same day chest x-ray, chest CT 3/29/2019    CONTRAST/COMPLICATIONS:  IV Contrast: Omnipaque 350  59 cc administered   41 cc discarded  Oral Contrast: NONE  Complications: None reported at time of study completion    PROCEDURE:  CT Angiography of the Chest.  Sagittal and coronal reformats were performed as well as 3D (MIP) reconstructions.    FINDINGS:    PULMONARY ARTERIES: No pulmonary embolism to the segmental branches. Limited visualization of subsegmental branches secondary to respiratory motion.    LUNGS AND LARGE AIRWAYS: The central airways are patent. Bilateral groundglass opacities compatible with COVID-19 pneumonia. Elevated left hemidiaphragm with left basilar atelectasis.  PLEURA: No pleural abnormality.  VESSELS: Normal caliber aorta.  HEART: Normal heart size. No pericardial effusion.  MEDIASTINUM AND MACHELLE: No adenopathy.  CHEST WALL AND LOWER NECK: No masses.  VISUALIZED UPPER ABDOMEN: Limited visualization is unremarkable.  BONES: No acute bony abnormality.    IMPRESSION:  *  No pulmonary embolism to the segmental branches. Limited visualization of subsegmental branches secondary to respiratory motion.  *  Bilateral groundglass opacities compatible with COVID-19 pneumonia. Elevated left hemidiaphragm with left basilar atelectasis.    < end of copied text >

## 2021-04-29 NOTE — PROGRESS NOTE ADULT - SUBJECTIVE AND OBJECTIVE BOX
[facility]    Greg    Patient is a 65y old  Male who presents with a chief complaint of covid19, hypoxia (28 Apr 2021 12:09)      BRIEF HOSPITAL COURSE: *65M no significant PMH who p/w worsening SOB, fevers, chills, headache, f/w hypoxic respiratory failure 2/2 COVID-19 PNA. His PCP tried to get him to come in for antibody infusion, but when arrived found to be hypoxic to 60's and ill appearing and was brought to ER. Required 100% NRB in the ED and then transitioned to 100% HFNC. Received Actemra on 4/19, started on steroids, full dose anticoagulation, and alternates between BPAP and HFNC.   **    Events last 24 hours: **  remains on avap  with high flow as tolerated increase in anxiety  with increase in xanax  added IV prn ativan and morphine prn *    PAST MEDICAL & SURGICAL HISTORY:  No pertinent past medical history    S/P shoulder surgery          Medications:      ALBUTerol    90 MICROgram(s) HFA Inhaler 2 Puff(s) Inhalation every 6 hours PRN    acetaminophen   Tablet .. 650 milliGRAM(s) Oral every 6 hours PRN  clonazePAM  Tablet 1 milliGRAM(s) Oral every 8 hours  dexMEDEtomidine Infusion 0.3 MICROgram(s)/kG/Hr IV Continuous <Continuous>  LORazepam   Injectable 1 milliGRAM(s) IV Push every 4 hours PRN  melatonin 5 milliGRAM(s) Oral at bedtime PRN  morphine  - Injectable 2 milliGRAM(s) IV Push every 6 hours PRN  morphine  - Injectable 2 milliGRAM(s) IV Push every 4 hours PRN      aspirin enteric coated 81 milliGRAM(s) Oral daily  enoxaparin Injectable 80 milliGRAM(s) SubCutaneous two times a day    pantoprazole  Injectable 40 milliGRAM(s) IV Push daily      methylPREDNISolone sodium succinate Injectable 40 milliGRAM(s) IV Push every 8 hours    cholecalciferol 2000 Unit(s) Oral daily  multivitamin 1 Tablet(s) Oral daily                ICU Vital Signs Last 24 Hrs  T(C): 37.2 (29 Apr 2021 07:35), Max: 37.2 (29 Apr 2021 07:35)  T(F): 98.9 (29 Apr 2021 07:35), Max: 98.9 (29 Apr 2021 07:35)  HR: 116 (29 Apr 2021 10:00) (73 - 129)  BP: 109/83 (29 Apr 2021 10:00) (92/63 - 138/84)  BP(mean): 88 (29 Apr 2021 10:00) (69 - 99)  ABP: --  ABP(mean): --  RR: 27 (29 Apr 2021 10:00) (17 - 41)  SpO2: 88% (29 Apr 2021 10:00) (87% - 97%)          I&O's Detail    28 Apr 2021 07:01  -  29 Apr 2021 07:00  --------------------------------------------------------  IN:    Dexmedetomidine: 6.1 mL    Oral Fluid: 750 mL  Total IN: 756.1 mL    OUT:    Voided (mL): 1140 mL  Total OUT: 1140 mL    Total NET: -383.9 mL      29 Apr 2021 07:01  -  29 Apr 2021 11:40  --------------------------------------------------------  IN:    Dexmedetomidine: 6.1 mL  Total IN: 6.1 mL    OUT:  Total OUT: 0 mL    Total NET: 6.1 mL            LABS:                        14.9   8.99  )-----------( 369      ( 28 Apr 2021 05:30 )             45.8     04-28    136  |  100  |  34.0<H>  ----------------------------<  134<H>  5.1   |  25.0  |  0.52    Ca    8.5<L>      28 Apr 2021 05:30  Phos  3.5     04-28  Mg     2.2     04-28    TPro  5.6<L>  /  Alb  2.5<L>  /  TBili  0.6  /  DBili  x   /  AST  21  /  ALT  31  /  AlkPhos  82  04-28          CAPILLARY BLOOD GLUCOSE      POCT Blood Glucose.: 149 mg/dL (29 Apr 2021 05:39)        CULTURES:      Physical Examination:    General:   frail male   on avap   alert   interactive  some anxiety     HEENT: Pupils equal, reactive to light.  Symmetric.    PULM: Clear to auscultation bilaterally, no significant sputum production    NECK: Supple, no lymphadenopathy, trachea midline    CVS: Regular rate and rhythm, no murmurs, rubs, or gallops    ABD: Soft, nondistended, nontender, normoactive bowel sounds, no masses    EXT: No edema, nontender    SKIN: Warm and well perfused, no rashes noted.    NEURO: Alert, oriented, interactive, nonfocal    DEVICES:     RADIOLOGY: ***

## 2021-04-29 NOTE — PROGRESS NOTE ADULT - SUBJECTIVE AND OBJECTIVE BOX
[facility]  INFECTIOUS DISEASES AND INTERNAL MEDICINE at [place]  =======================================================  Jose G Avilez MD  Diplomates American Board of Internal Medicine and Infectious Diseases  Tel: [phone]      Fax: [phone]  =======================================================    BAILEE COPELAND [id number]    Follow up: COVID 19     On BIPAP this AM  s/p Actemra 4/20      Allergies:  No Known Allergies      REVIEW OF SYSTEMS:  CONSTITUTIONAL:  No Fever or chills  HEENT:  No diplopia or blurred vision.  No earache, sore throat or runny nose.  CARDIOVASCULAR:  No pressure, squeezing, strangling, tightness, heaviness or aching about the chest, neck, axilla or epigastrium.  RESPIRATORY:  + cough, + shortness of breath  GASTROINTESTINAL:  No nausea, vomiting or diarrhea.  GENITOURINARY:  No dysuria, frequency or urgency.   MUSCULOSKELETAL:  no joint aches, no muscle pain  SKIN:  No change in skin, hair or nails.  NEUROLOGIC:  No Headaches, seizures   PSYCHIATRIC:  No disorder of thought or mood.  ENDOCRINE:  No heat or cold intolerance  HEMATOLOGICAL:  No easy bruising or bleeding.       Physical Exam:  GEN: Mild respiratory distress   HEENT: normocephalic and atraumatic. EOMI. PERRL.  Anicteric  NECK: Supple.   LUNGS: Coarse BS B/L  HEART: Regular rate and rhythm   ABDOMEN: Soft, nontender, and nondistended.  Positive bowel sounds.    : No CVA tenderness  EXTREMITIES: Without any edema.  MSK: No joint swelling  NEUROLOGIC: No Focal Deficits  PSYCHIATRIC: Anxious   SKIN: No Rash      Vitals:    T(F): 98.9 (29 Apr 2021 07:35), Max: 98.9 (29 Apr 2021 07:35)  HR: 129 (29 Apr 2021 09:00)  BP: 107/83 (29 Apr 2021 08:00)  RR: 36 (29 Apr 2021 09:00)  SpO2: 90% (29 Apr 2021 09:00) (87% - 97%)  temp max in last 48H T(F): , Max: 98.9 (04-29-21 @ 07:35)    Current Antibiotics:    Other medications:  aspirin enteric coated 81 milliGRAM(s) Oral daily  cholecalciferol 2000 Unit(s) Oral daily  clonazePAM  Tablet 1 milliGRAM(s) Oral every 8 hours  dexMEDEtomidine Infusion 0.3 MICROgram(s)/kG/Hr IV Continuous <Continuous>  enoxaparin Injectable 80 milliGRAM(s) SubCutaneous two times a day  methylPREDNISolone sodium succinate Injectable 40 milliGRAM(s) IV Push every 8 hours  multivitamin 1 Tablet(s) Oral daily  pantoprazole  Injectable 40 milliGRAM(s) IV Push daily                            14.9   8.99  )-----------( 369      ( 28 Apr 2021 05:30 )             45.8     04-28    136  |  100  |  34.0<H>  ----------------------------<  134<H>  5.1   |  25.0  |  0.52    Ca    8.5<L>      28 Apr 2021 05:30  Phos  3.5     04-28  Mg     2.2     04-28    TPro  5.6<L>  /  Alb  2.5<L>  /  TBili  0.6  /  DBili  x   /  AST  21  /  ALT  31  /  AlkPhos  82  04-28    RECENT CULTURES:  04-19 @ 18:14 .Blood Blood-Peripheral     No growth at 5 days.    WBC Count: 8.99 K/uL (04-28-21 @ 05:30)  WBC Count: 7.49 K/uL (04-27-21 @ 05:06)  WBC Count: 6.58 K/uL (04-26-21 @ 04:46)  WBC Count: 6.54 K/uL (04-25-21 @ 04:52)    Creatinine, Serum: 0.52 mg/dL (04-28-21 @ 05:30)  Creatinine, Serum: 0.52 mg/dL (04-27-21 @ 05:06)  Creatinine, Serum: 0.53 mg/dL (04-26-21 @ 04:46)  Creatinine, Serum: 0.55 mg/dL (04-25-21 @ 04:52)    C-Reactive Protein, Serum: 15 mg/L (04-29-21 @ 07:50)  C-Reactive Protein, Serum: 16 mg/L (04-28-21 @ 05:30)  C-Reactive Protein, Serum: 27 mg/L (04-27-21 @ 05:07)  C-Reactive Protein, Serum: 40 mg/L (04-26-21 @ 04:46)  C-Reactive Protein, Serum: 66 mg/L (04-25-21 @ 04:53)  C-Reactive Protein, Serum: 154 mg/L (04-24-21 @ 04:43)  C-Reactive Protein, Serum: 134 mg/L (04-23-21 @ 06:56)  C-Reactive Protein, Serum: 128 mg/L (04-22-21 @ 07:09)  C-Reactive Protein, Serum: 174 mg/L (04-21-21 @ 11:00)  C-Reactive Protein, Serum: 211 mg/L (04-20-21 @ 05:41)  C-Reactive Protein, Serum: >422 mg/L (04-16-21 @ 18:00)    Ferritin, Serum: 1506 ng/mL (04-29-21 @ 07:50)  Ferritin, Serum: 1254 ng/mL (04-28-21 @ 05:30)  Ferritin, Serum: 1203 ng/mL (04-27-21 @ 05:07)  Ferritin, Serum: 1177 ng/mL (04-26-21 @ 04:46)  Ferritin, Serum: 1326 ng/mL (04-25-21 @ 04:53)  Ferritin, Serum: 2343 ng/mL (04-24-21 @ 04:43)  Ferritin, Serum: 1279 ng/mL (04-23-21 @ 06:56)  Ferritin, Serum: 1254 ng/mL (04-22-21 @ 07:09)  Ferritin, Serum: 1328 ng/mL (04-21-21 @ 11:00)  Ferritin, Serum: 1075 ng/mL (04-20-21 @ 05:41)  Ferritin, Serum: 829 ng/mL (04-16-21 @ 18:00)    Procalcitonin, Serum: 0.12 ng/mL (04-27-21 @ 05:06)  Procalcitonin, Serum: 0.67 ng/mL (04-19-21 @ 18:07)  Procalcitonin, Serum: 5.93 ng/mL (04-16-21 @ 18:00)     Rapid RVP Result: NotDetec (04-16-21 @ 18:27)  SARS-CoV-2: Detected (04-16-21 @ 18:27)        < from: CT Angio Chest w/ IV Cont (04.21.21 @ 18:28) >  EXAM:  CT ANGIO CHEST (W)AW IC                          PROCEDURE DATE:  04/21/2021      INTERPRETATION:  CLINICAL INFORMATION: COVID-19 positive with deep vein thrombosis    COMPARISON: Same day chest x-ray, chest CT 3/29/2019    CONTRAST/COMPLICATIONS:  IV Contrast: Omnipaque 350  59 cc administered   41 cc discarded  Oral Contrast: NONE  Complications: None reported at time of study completion    PROCEDURE:  CT Angiography of the Chest.  Sagittal and coronal reformats were performed as well as 3D (MIP) reconstructions.    FINDINGS:    PULMONARY ARTERIES: No pulmonary embolism to the segmental branches. Limited visualization of subsegmental branches secondary to respiratory motion.    LUNGS AND LARGE AIRWAYS: The central airways are patent. Bilateral groundglass opacities compatible with COVID-19 pneumonia. Elevated left hemidiaphragm with left basilar atelectasis.  PLEURA: No pleural abnormality.  VESSELS: Normal caliber aorta.  HEART: Normal heart size. No pericardial effusion.  MEDIASTINUM AND MACHELLE: No adenopathy.  CHEST WALL AND LOWER NECK: No masses.  VISUALIZED UPPER ABDOMEN: Limited visualization is unremarkable.  BONES: No acute bony abnormality.    IMPRESSION:  *  No pulmonary embolism to the segmental branches. Limited visualization of subsegmental branches secondary to respiratory motion.  *  Bilateral groundglass opacities compatible with COVID-19 pneumonia. Elevated left hemidiaphragm with left basilar atelectasis.    < end of copied text >

## 2021-04-29 NOTE — PROGRESS NOTE ADULT - ASSESSMENT
65y  Male with no PMH comes to the ER with worsening shortness of breath. Patient states that has been having fevers, chills and headache for the last 8-10 days. Both of his sons who are in school recently were quarantining from school due to covid. He states the last few days he's been feeling especially weak and more short of breath. His PCP tried to get him to come in for antibody infusion, but when arrived found to be hypoxic to 60's and ill appearing and was brought to ER. In the ER patient was initially on 100% NRB and followed by 100% HFNC. Started on Remdesivir and Dexamethasone. Now on BIPAP.      Acute Hypoxic respiratory failure  COVID-19 infection  B/L Pneumonia   cough  fevers  shortness of breath      - COVID 19 PCR positive   - Blood cultures no growth   - CT Chest reporting PNA    - Continue supportive care measures  - continue to trend inflammatory markers.   - trend CBC with diff, CMP,  CRP, Ferritin,  procalcitonin q 48hours  - Avoid antibiotics unless there is a concern for a bacterial infection  - May consider vitamin C, thiamine and zinc (note lack of evidence to support benefit with COVID 19)  - Discussed Remdesivir with the patient.   - Completed 10 days of Remdesivir   - s/p Actemra 4/20  - Dexamethasone 6mg Daily   - follow up all outstanding cultures  - Trend Fever  - Trend Leukocytosis      Will sign off. Please call PRN.       D/W MICU team

## 2021-04-29 NOTE — PROGRESS NOTE ADULT - ASSESSMENT
64 yo male admitted on 4/16 with COVID19 viral pneumonia, now with acute respiratory failure requiring NIV, DVT.  Transferred to MICU service on 4/19.      1- acute  respiratory failure   2- covid   pneumonia  3-4/20/21 b/l  DVT  - right femoral vein left posterior tibial vein   CTPA 4/21 neg  4-  left hemidiaphragm  elevation on  cxr    ? paralysis       neurologic   alert  precedex  add prn ativan    clonazepam  morphine prn    respiratory   high flow trial today   sol medrol  symbicort  albuterol,  sniff test once stable    ID s/p remdesivir  10 days  completed   s/p   actemra 4/20  - s/p dexamethasone    Heme  full dose lovenox    GI   feeds PPI    Critical  care TIme  > 35  minutes were spent assessing the patient's presenting problems of acute illness that pose a high probability   of life threatening  deterioration or end organ damage / dysfunction.  Medical decision making includes initiation/ continuation of plan or care review data/labwork/  radiographic study,  direct patient  care bedside ,  discussions with  consultants regarding care,     evaluation  and  interpretation of vital signs,  any necessary ventilator management,   NIV or BIPAP changes  or initiation,    discussions with multidisciplinary team,  am or pm rounds,  discussions of goals of care with patient and family  all non-inclusive of procedures.

## 2021-04-30 NOTE — PROGRESS NOTE ADULT - SUBJECTIVE AND OBJECTIVE BOX
[facility]  INFECTIOUS DISEASES AND INTERNAL MEDICINE at [place]  =======================================================  Jose G Avilez MD  Diplomates American Board of Internal Medicine and Infectious Diseases  Tel: [phone]      Fax: [phone]  =======================================================    BAILEE COPELAND [id number]    Follow up: COVID 19     Had cardiac arrest and intubated, requiring Chest tube placement    s/p Actemra 4/20      Allergies:  No Known Allergies      REVIEW OF SYSTEMS:  unable to obtain due to medical condition       Physical Exam:  GEN: sedated  HEENT: normocephalic and atraumatic.  Anicteric   NECK: Supple.   LUNGS: diffuse rhonchi   HEART: Regular rate and rhythm   ABDOMEN: Soft, nontender, and nondistended.  Positive bowel sounds.    : Hernandez   EXTREMITIES: trace edema.  MSK: no joint swelling  NEUROLOGIC: Sedated   PSYCHIATRIC: sedated   SKIN: No Rash      Vitals:  T(F): 103.2 (30 Apr 2021 16:02), Max: 103.2 (30 Apr 2021 16:02)  HR: 105 (30 Apr 2021 14:00)  BP: 89/52 (30 Apr 2021 07:00)  RR: 30 (30 Apr 2021 14:00)  SpO2: 82% (30 Apr 2021 14:00) (60% - 98%)  temp max in last 48H T(F): , Max: 103.2 (04-30-21 @ 16:02)      Current Antibiotics:  meropenem  IVPB 500 milliGRAM(s) IV Intermittent every 12 hours    Other medications:  aspirin enteric coated 81 milliGRAM(s) Oral daily  chlorhexidine 0.12% Liquid 15 milliLiter(s) Oral Mucosa every 12 hours  cholecalciferol 2000 Unit(s) Oral daily  cisatracurium Infusion 3 MICROgram(s)/kG/Min IV Continuous <Continuous>  clonazePAM  Tablet 1 milliGRAM(s) Oral every 8 hours  enoxaparin Injectable 80 milliGRAM(s) SubCutaneous two times a day  fentaNYL   Infusion. 0.5 MICROgram(s)/kG/Hr IV Continuous <Continuous>  lactated ringers. 1000 milliLiter(s) IV Continuous <Continuous>  methylPREDNISolone sodium succinate Injectable 40 milliGRAM(s) IV Push every 8 hours  multivitamin 1 Tablet(s) Oral daily  norepinephrine Infusion 0.05 MICROgram(s)/kG/Min IV Continuous <Continuous>  pantoprazole  Injectable 40 milliGRAM(s) IV Push daily  phenylephrine    Infusion 1 MICROgram(s)/kG/Min IV Continuous <Continuous>  propofol Infusion 10 MICROgram(s)/kG/Min IV Continuous <Continuous>  sodium bicarbonate  Infusion 0.183 mEq/kG/Hr IV Continuous <Continuous>  vasopressin Infusion 0.04 Unit(s)/Min IV Continuous <Continuous>                            14.9   30.63 )-----------( 402      ( 30 Apr 2021 05:11 )             47.7     04-30    139  |  98  |  50.0<H>  ----------------------------<  261<H>  4.6   |  24.0  |  1.55<H>    Ca    7.4<L>      30 Apr 2021 12:01  Phos  5.3     04-30  Mg     2.1     04-30    TPro  4.7<L>  /  Alb  1.9<L>  /  TBili  1.3  /  DBili  x   /  AST  97<H>  /  ALT  90<H>  /  AlkPhos  78  04-30      RECENT CULTURES:  04-19 @ 18:14 .Blood Blood-Peripheral     No growth at 5 days.      WBC Count: 30.63 K/uL (04-30-21 @ 05:11)  WBC Count: 8.99 K/uL (04-28-21 @ 05:30)  WBC Count: 7.49 K/uL (04-27-21 @ 05:06)  WBC Count: 6.58 K/uL (04-26-21 @ 04:46)    Creatinine, Serum: 1.55 mg/dL (04-30-21 @ 12:01)  Creatinine, Serum: 1.02 mg/dL (04-30-21 @ 05:11)  Creatinine, Serum: 0.52 mg/dL (04-28-21 @ 05:30)  Creatinine, Serum: 0.52 mg/dL (04-27-21 @ 05:06)  Creatinine, Serum: 0.53 mg/dL (04-26-21 @ 04:46)    C-Reactive Protein, Serum: 123 mg/L (04-30-21 @ 05:11)  C-Reactive Protein, Serum: 15 mg/L (04-29-21 @ 07:50)  C-Reactive Protein, Serum: 16 mg/L (04-28-21 @ 05:30)  C-Reactive Protein, Serum: 27 mg/L (04-27-21 @ 05:07)  C-Reactive Protein, Serum: 40 mg/L (04-26-21 @ 04:46)  C-Reactive Protein, Serum: 66 mg/L (04-25-21 @ 04:53)  C-Reactive Protein, Serum: 154 mg/L (04-24-21 @ 04:43)  C-Reactive Protein, Serum: 134 mg/L (04-23-21 @ 06:56)  C-Reactive Protein, Serum: 128 mg/L (04-22-21 @ 07:09)  C-Reactive Protein, Serum: 174 mg/L (04-21-21 @ 11:00)  C-Reactive Protein, Serum: 211 mg/L (04-20-21 @ 05:41)  C-Reactive Protein, Serum: >422 mg/L (04-16-21 @ 18:00)    Ferritin, Serum: 4325 ng/mL (04-30-21 @ 05:11)  Ferritin, Serum: 1506 ng/mL (04-29-21 @ 07:50)  Ferritin, Serum: 1254 ng/mL (04-28-21 @ 05:30)  Ferritin, Serum: 1203 ng/mL (04-27-21 @ 05:07)  Ferritin, Serum: 1177 ng/mL (04-26-21 @ 04:46)  Ferritin, Serum: 1326 ng/mL (04-25-21 @ 04:53)  Ferritin, Serum: 2343 ng/mL (04-24-21 @ 04:43)  Ferritin, Serum: 1279 ng/mL (04-23-21 @ 06:56)  Ferritin, Serum: 1254 ng/mL (04-22-21 @ 07:09)  Ferritin, Serum: 1328 ng/mL (04-21-21 @ 11:00)  Ferritin, Serum: 1075 ng/mL (04-20-21 @ 05:41)  Ferritin, Serum: 829 ng/mL (04-16-21 @ 18:00)    Procalcitonin, Serum: 0.12 ng/mL (04-27-21 @ 05:06)  Procalcitonin, Serum: 0.67 ng/mL (04-19-21 @ 18:07)  Procalcitonin, Serum: 5.93 ng/mL (04-16-21 @ 18:00)     Rapid RVP Result: NotDetec (04-16-21 @ 18:27)  SARS-CoV-2: Detected (04-16-21 @ 18:27)        < from: CT Angio Chest w/ IV Cont (04.21.21 @ 18:28) >  EXAM:  CT ANGIO CHEST (W)AW IC                          PROCEDURE DATE:  04/21/2021      INTERPRETATION:  CLINICAL INFORMATION: COVID-19 positive with deep vein thrombosis    COMPARISON: Same day chest x-ray, chest CT 3/29/2019    CONTRAST/COMPLICATIONS:  IV Contrast: Omnipaque 350  59 cc administered   41 cc discarded  Oral Contrast: NONE  Complications: None reported at time of study completion    PROCEDURE:  CT Angiography of the Chest.  Sagittal and coronal reformats were performed as well as 3D (MIP) reconstructions.    FINDINGS:    PULMONARY ARTERIES: No pulmonary embolism to the segmental branches. Limited visualization of subsegmental branches secondary to respiratory motion.    LUNGS AND LARGE AIRWAYS: The central airways are patent. Bilateral groundglass opacities compatible with COVID-19 pneumonia. Elevated left hemidiaphragm with left basilar atelectasis.  PLEURA: No pleural abnormality.  VESSELS: Normal caliber aorta.  HEART: Normal heart size. No pericardial effusion.  MEDIASTINUM AND MACHELLE: No adenopathy.  CHEST WALL AND LOWER NECK: No masses.  VISUALIZED UPPER ABDOMEN: Limited visualization is unremarkable.  BONES: No acute bony abnormality.    IMPRESSION:  *  No pulmonary embolism to the segmental branches. Limited visualization of subsegmental branches secondary to respiratory motion.  *  Bilateral groundglass opacities compatible with COVID-19 pneumonia. Elevated left hemidiaphragm with left basilar atelectasis.    < end of copied text >

## 2021-04-30 NOTE — GOALS OF CARE CONVERSATION - ADVANCED CARE PLANNING - CONVERSATION DETAILS
--  Updated   health care Proxy sister MARILYNN over the phone then at bedside  -  sister Bina and Marilynn at bedside    -discussed poor prognosis with shock  status and severe hypoxemic respiratory failure due to covid  complicated by lung fibrosis and  left PTX    - remains  full code     - sons came in to visit today

## 2021-04-30 NOTE — PROGRESS NOTE ADULT - SUBJECTIVE AND OBJECTIVE BOX
[facility]    Greg    Patient is a 65y old  Male who presents with a chief complaint of covid19, hypoxia (29 Apr 2021 14:28)      BRIEF HOSPITAL COURSE: *65M no significant PMH who p/w worsening SOB, fevers, chills, headache, f/w hypoxic respiratory failure 2/2 COVID-19 PNA. His PCP tried to get him to come in for antibody infusion, but when arrived found to be hypoxic to 60's and ill appearing and was brought to ER. Required 100% NRB in the ED and then transitioned to 100% HFNC. Received Actemra on 4/19, started on steroids, full dose anticoagulation, and alternates between BPAP and HFNC.   **  **    Events last 24 hours: *   respiratory failure  with  left ptx   pigtail then large bore  chest tube placed with large air leak    had arrested witn  20 minutes cpr  --  currently  intubuated  left air leak +    on levophed phenylephrine   vaso   bicarbonate drip  fentanyl  versed    added  nimbex   and proned at    five pm   *    PAST MEDICAL & SURGICAL HISTORY:  No pertinent past medical history    S/P shoulder surgery          Medications:    norepinephrine Infusion 0.05 MICROgram(s)/kG/Min IV Continuous <Continuous>  phenylephrine    Infusion 1 MICROgram(s)/kG/Min IV Continuous <Continuous>    ALBUTerol    90 MICROgram(s) HFA Inhaler 2 Puff(s) Inhalation every 6 hours PRN    acetaminophen   Tablet .. 650 milliGRAM(s) Oral every 6 hours PRN  cisatracurium Infusion 3 MICROgram(s)/kG/Min IV Continuous <Continuous>  clonazePAM  Tablet 1 milliGRAM(s) Oral every 8 hours  fentaNYL   Infusion. 0.5 MICROgram(s)/kG/Hr IV Continuous <Continuous>  LORazepam   Injectable 1 milliGRAM(s) IV Push every 4 hours PRN  melatonin 5 milliGRAM(s) Oral at bedtime PRN  morphine  - Injectable 2 milliGRAM(s) IV Push every 4 hours PRN  propofol Infusion 10 MICROgram(s)/kG/Min IV Continuous <Continuous>  rocuronium Injectable 50 milliGRAM(s) IV Push once      aspirin enteric coated 81 milliGRAM(s) Oral daily  enoxaparin Injectable 80 milliGRAM(s) SubCutaneous two times a day    pantoprazole  Injectable 40 milliGRAM(s) IV Push daily      methylPREDNISolone sodium succinate Injectable 40 milliGRAM(s) IV Push every 8 hours  vasopressin Infusion 0.04 Unit(s)/Min IV Continuous <Continuous>    cholecalciferol 2000 Unit(s) Oral daily  lactated ringers. 1000 milliLiter(s) IV Continuous <Continuous>  multivitamin 1 Tablet(s) Oral daily  sodium bicarbonate  Infusion 0.183 mEq/kG/Hr IV Continuous <Continuous>      chlorhexidine 0.12% Liquid 15 milliLiter(s) Oral Mucosa every 12 hours        Mode: AC/ CMV (Assist Control/ Continuous Mandatory Ventilation)  RR (machine): 30  TV (machine): 440  FiO2: 100  PEEP: 10  ITime: 1  MAP: 20  PIP: 34      ICU Vital Signs Last 24 Hrs  T(C): 39.6 (30 Apr 2021 16:02), Max: 39.6 (30 Apr 2021 16:02)  T(F): 103.2 (30 Apr 2021 16:02), Max: 103.2 (30 Apr 2021 16:02)  HR: 105 (30 Apr 2021 14:00) (0 - 180)  BP: 89/52 (30 Apr 2021 07:00) (67/54 - 240/145)  BP(mean): 67 (30 Apr 2021 07:00) (56 - 105)  ABP: 94/55 (30 Apr 2021 14:00) (85/50 - 124/68)  ABP(mean): 68 (30 Apr 2021 14:00) (61 - 80)  RR: 30 (30 Apr 2021 14:00) (22 - 41)  SpO2: 82% (30 Apr 2021 14:00) (60% - 98%)      ABG - ( 30 Apr 2021 10:17 )  pH, Arterial: 7.33  pH, Blood: x     /  pCO2: 52    /  pO2: 46    / HCO3: 24    / Base Excess: 0.4   /  SaO2: 78                  I&O's Detail    29 Apr 2021 07:01  -  30 Apr 2021 07:00  --------------------------------------------------------  IN:    Dexmedetomidine: 129.1 mL    FentaNYL: 30.6 mL    Norepinephrine: 331 mL    Phenylephrine: 190 mL    Propofol: 49 mL    Sodium Bicarbonate: 300 mL  Total IN: 1029.7 mL    OUT:    Indwelling Catheter - Urethral (mL): 175 mL    Voided (mL): 350 mL  Total OUT: 525 mL    Total NET: 504.7 mL      30 Apr 2021 07:01  -  30 Apr 2021 17:07  --------------------------------------------------------  IN:    FentaNYL: 73.8 mL    IV PiggyBack: 250 mL    IV PiggyBack: 50 mL    Lactated Ringers: 1000 mL    Norepinephrine: 56.2 mL    Phenylephrine: 986 mL    Propofol: 95.2 mL    Sodium Bicarbonate: 700 mL    Vasopressin: 12 mL  Total IN: 3223.2 mL    OUT:    Chest Tube (mL): 50 mL    Indwelling Catheter - Urethral (mL): 140 mL  Total OUT: 190 mL    Total NET: 3033.2 mL            LABS:                        14.9   30.63 )-----------( 402      ( 30 Apr 2021 05:11 )             47.7     04-30    139  |  98  |  50.0<H>  ----------------------------<  261<H>  4.6   |  24.0  |  1.55<H>    Ca    7.4<L>      30 Apr 2021 12:01  Phos  5.3     04-30  Mg     2.1     04-30    TPro  4.7<L>  /  Alb  1.9<L>  /  TBili  1.3  /  DBili  x   /  AST  97<H>  /  ALT  90<H>  /  AlkPhos  78  04-30      CARDIAC MARKERS ( 30 Apr 2021 05:11 )  x     / 0.07 ng/mL / x     / x     / x          CAPILLARY BLOOD GLUCOSE      POCT Blood Glucose.: 199 mg/dL (30 Apr 2021 12:02)        CULTURES:      Physical Examination:    General:   sedated intubated      HEENT:  sclera anicteric   et   og in place   22 cm     PULM:   ++   subcutaneous emphysema  on left   neck  chest  and posteriorly  as well   --  bilateral air entry no  wheezing rales  rhonchi  NECK:   left tlc      CVS: Regular rate and rhythm, no murmurs, rubs, or gallops    ABD: Soft, nondistended, nontender, normoactive bowel sounds, no masses    EXT: No edema, nontender    SKIN: Warm and well perfused, no rashes noted.    NEURO:    sedated   DEVICES:     pigtail  left    chest tube left  ++ air leak     et og  cazares   yellow urine     RADIOLOGY: **IMPRESSION:  Lines and tubes in satisfactory position with a small left pneumothorax which has improved.    Bilateral perihilar reticulonodular infiltrates, grossly unchanged.    Soft tissue emphysema at the left chest, improved    STUART PASCAL DO; Attending Radiologist  This document has been electronically signed. Apr 30 2021  1:08PM      CRITICAL CARE TIME SPENT: ***

## 2021-04-30 NOTE — PROGRESS NOTE ADULT - ASSESSMENT
65y  Male with no PMH comes to the ER with worsening shortness of breath. Patient states that has been having fevers, chills and headache for the last 8-10 days. Both of his sons who are in school recently were quarantining from school due to covid. He states the last few days he's been feeling especially weak and more short of breath. His PCP tried to get him to come in for antibody infusion, but when arrived found to be hypoxic to 60's and ill appearing and was brought to ER. In the ER patient was initially on 100% NRB and followed by 100% HFNC. Started on Remdesivir and Dexamethasone. Now on BIPAP.      Cardiac arrest   Acute Hypoxic respiratory failure  Left Pneumothorax    COVID-19 infection  B/L Pneumonia   cough  fevers  shortness of breath  oliguria       - Repeat blood cultures  - Continue Meropenem  - Continue Vancomycin  - Monitor trough  - Monitor for Vancomycin toxicity   - COVID 19 PCR positive   - Blood cultures no growth   - CT Chest reporting PNA    - Continue supportive care measures  - continue to trend inflammatory markers.   - trend CBC with diff, CMP,  CRP, Ferritin,  procalcitonin q 48hours  - May consider vitamin C, thiamine and zinc (note lack of evidence to support benefit with COVID 19)  - Discussed Remdesivir with the patient.   - Completed 10 days of Remdesivir   - s/p Actemra 4/20  - Dexamethasone 6mg Daily   - follow up all outstanding cultures  - Trend Fever  - Trend Leukocytosis      Will follow       D/W MICU team

## 2021-04-30 NOTE — PROGRESS NOTE ADULT - ASSESSMENT
66 yo male admitted on 4/16 with COVID19 viral pneumonia, now with acute respiratory failure requiring NIV, DVT.  Transferred to MICU service on 4/19.      1- s/p cardiac arrest 4/29/2021    ? anoxic encephalopathy    2- acute  respiratory failure  --    intubated  4/29/2021    left ptx   with + air leak   3- covid   pneumonia -  fibrosing  lung   end stage   4-4/20/21 b/l  DVT  - right femoral vein left posterior tibial vein   CTPA 4/21 neg  5-  left hemidiaphragm  elevation on  cxr    ? paralysis     neurologic    nimbex       fentanyl   versed    respiratory  proning x 1   5 pm   for  24 hours,   abg ordered  large air leak  on left    100 %  rate 30/  / peep 12 -- will attempt to wean down  peep and    chest tube  pressures  20 ----15----10   as tolerated    FEN    repeat abg,  lactic acid     bmp  on bicarbonate drip    heme  full dose lovenox    ID  merrem  vanco    s/p remdesivir  10 days  completed   s/p   actemra 4/20  - s/p dexamethasone    Heme  full dose lovenox    GI   ppi    Critical  care TIme  > 50 minutes were spent assessing the patient's presenting problems of acute illness that pose a high probability   of life threatening  deterioration or end organ damage / dysfunction.  Medical desicion making includes initiation/ continuation of plan or care review data/labwork/  radiographic study,  direct patient  care bedside ,  discussions with  consultants regarding care,     evaluation  and  interpretation of vital signs,  any necessary ventilator management,   NIV or BIPAP changes  or initiation,    discussions with multidisciplinary team,  am or pm rounds,  discussions of goals of care with patient and family  all non-inclusive of procedures.       66 yo male admitted on 4/16 with COVID19 viral pneumonia, now with acute respiratory failure requiring NIV, DVT.  Transferred to MICU service on 4/19.      1- s/p cardiac arrest 4/29/2021    ? anoxic encephalopathy    2- acute  respiratory failure  --    intubated  4/29/2021    left ptx   with + air leak   3- covid   pneumonia -  fibrosing  lung   end stage   4-4/20/21 b/l  DVT  - right femoral vein left posterior tibial vein   CTPA 4/21 neg  5-  left hemidiaphragm  elevation on  cxr    ? paralysis     neurologic    nimbex       fentanyl   propofol    respiratory  proning x 1   5 pm   for  24 hours,   abg ordered  large air leak  on left    100 %  rate 30/  / peep 12 -- will attempt to wean down  peep and    chest tube  pressures  20 ----15----10   as tolerated    FEN    repeat abg,  lactic acid     bmp  on bicarbonate drip    heme  full dose lovenox    ID  merrem  vanco    s/p remdesivir  10 days  completed   s/p   actemra 4/20  - s/p dexamethasone    Heme  full dose lovenox    [facility]  stress dose steroids     levophed    vaso  phenylephrine   repeat  lactic acid    GI   ppi    Critical  care TIme  > 50 minutes were spent assessing the patient's presenting problems of acute illness that pose a high probability   of life threatening  deterioration or end organ damage / dysfunction.  Medical desicion making includes initiation/ continuation of plan or care review data/labwork/  radiographic study,  direct patient  care bedside ,  discussions with  consultants regarding care,     evaluation  and  interpretation of vital signs,  any necessary ventilator management,   NIV or BIPAP changes  or initiation,    discussions with multidisciplinary team,  am or pm rounds,  discussions of goals of care with patient and family  all non-inclusive of procedures.

## 2021-04-30 NOTE — PROCEDURE NOTE - NSNEEDLEGAUGE_GEN_A_CORE
VSS, pt is pleasantly confused during the night but easily reorientated, pt has been awake most of the night, no other changes   18
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
## 2021-04-30 NOTE — CHART NOTE - NSCHARTNOTEFT_GEN_A_CORE
Pt with worsening respiratory distress w/ severe hypoxemia. family was updated and it was decided that pt would be electively intubated. Post procedure pt went into asystole/ PEA cardiac arrest, ROSC in 15 minutes. Post code CXR w/ R sided tension PTX. An emergent right sided PCT was inserted w/o any complication w/ rapid improvement in BP and saturations.  Sister Bushra was called and updated about code and need for chest tube. Pt with worsening respiratory distress w/ severe hypoxemia. family was updated and it was decided that pt would be electively intubated. Post procedure pt went into asystole/ PEA cardiac arrest, ROSC in 15 minutes. Post code CXR w/ R sided tension PTX. An emergent right sided PCT was inserted w/o any complication w/ rapid improvement in BP and saturations.  Sister Bushra was called and updated about code and need for chest tube.  Repeat CXR pending after L sided CVC insertion. Currently on low dose levophed

## 2021-05-01 NOTE — CONSULT NOTE ADULT - PROBLEM SELECTOR RECOMMENDATION 9
Pt with left formal chest tube with continuous AL to suction and Left pigtail no AL to suction,   F/U CXR s/p repositioned formal chest tube   F/U AM CXR   Thoracic following     D/W Dr. Black
Pt receiving remdesivir and steroids  I spoke with ID/Rene and he will evaluate to see if pt is candidate for tocilizumab
Consider CVVHDF    D/W Cordelia ( [facility] ) , Dr. López & Sister,

## 2021-05-01 NOTE — GOALS OF CARE CONVERSATION - ADVANCED CARE PLANNING - CONVERSATION DETAILS
Explained to Bushra, pt's sister, that despite max ventilatory support and proning, pt's spo2 and pao2 remain critically low. Pt also on 3 pressors, one of which maxed out still with soft BPs and now pt's kidneys are failing. Explained to Bushra despite all of these measures pt's continues to decline and further deteriorate. HD/CRRT might hasten pt's dying process if hemodynamically tolerable at all. I also discussed with her that CPR would not improve state of lungs or other failing organs if his heart were to stop. Bushra understands how sick her brother is and does not wish to continue escalating doses of medications to keep blood pressure stable, nor does she want her brother to go into any more suffering if his heart were to stop again. Pt is DNR, no HD, no escalation of vasopressors. Explained to Bushra, pt's sister, that despite max ventilatory support and proning, pt's spo2 and pao2 remain critically low. Pt also on 3 pressors, one of which maxed out still with soft BPs and now pt's kidneys are failing. Explained to Bushra despite all of these measures pt's continues to decline and further deteriorate. HD/CRRT might hasten pt's dying process if hemodynamically tolerable at all. I also discussed with her that CPR would not improve state of lungs or other failing organs if his heart were to stop. Bushra understands how sick her brother is and does not wish to continue escalating doses of medications to keep blood pressure stable, nor does she want her brother to go into any more suffering if his heart were to stop again. Pt is DNR, no HD, no escalation of vasopressors. D/w ICU attending Dr FAVIOLA Duran, who confirmed familys wishes as well.

## 2021-05-01 NOTE — PROGRESS NOTE ADULT - ASSESSMENT
65y  Male with no PMH comes to the ER with worsening shortness of breath. Patient states that has been having fevers, chills and headache for the last 8-10 days. Both of his sons who are in school recently were quarantining from school due to covid. He states the last few days he's been feeling especially weak and more short of breath. His PCP tried to get him to come in for antibody infusion, but when arrived found to be hypoxic to 60's and ill appearing and was brought to ER. In the ER patient was initially on 100% NRB and followed by 100% HFNC. Started on Remdesivir and Dexamethasone. Now on BIPAP.      Cardiac arrest   Acute Hypoxic respiratory failure  Left Pneumothorax    COVID-19 infection  B/L Pneumonia   cough  fevers  shortness of breath  oliguria       - Repeat blood cultures pending  - Continue Meropenem  - Continue Vancomycin  - Monitor trough  - Monitor for Vancomycin toxicity   - COVID 19 PCR positive   - Blood cultures no growth   - CT Chest reporting PNA    - Continue supportive care measures  - continue to trend inflammatory markers.   - trend CBC with diff, CMP,  CRP, Ferritin,  procalcitonin q 48hours  - May consider vitamin C, thiamine and zinc (note lack of evidence to support benefit with COVID 19)  - Discussed Remdesivir with the patient.   - Completed 10 days of Remdesivir   - s/p Actemra 4/20  - Dexamethasone 6mg Daily   - follow up all outstanding cultures  - Trend Fever  - Trend Leukocytosis      Will follow       D/W MICU team

## 2021-05-01 NOTE — PROCEDURE NOTE - ADDITIONAL PROCEDURE DETAILS
ARDS septic shock
Called by MICU to place urgent dialysis access for CVVHD due to their increasing census and patient acuity, consent obtained by MICU earlier, catheter placed without issue as above. Please recall for any issues.

## 2021-05-01 NOTE — CONSULT NOTE ADULT - ASSESSMENT
Avoidance of nephrotoxins/nephrotoxin medication adjustment  Medication dosage adjustment for kidney function  Continuous IVF administration (guided by CVP and testing of fluid responsiveness for 6 hours )  Discontinuation of ACE/ARBs ,  Close monitoring of serum Creatinine and UO  Acid-base, electrolyte and albumin status management  Avoidance of hyperglycemia,  Consider alternatives to radiocontrast administration  Optimizing hemodynamics:      Possible CRRT

## 2021-05-01 NOTE — CONSULT NOTE ADULT - SUBJECTIVE AND OBJECTIVE BOX
HPI:  65M no pmh presents for worsening shortness of breath. Patient states that has been having fevers, chills and headache for the last 10 days. Both of his sons who are in school recently were quarantining from school due to covid. He states the last few days he's been feeling especially weak and more short of breath. His PCP tried to get him to come in for antibody infusion, but when arrived found to be hypoxic to 60's and ill appearing and was brought to ER.     In ER, pt initially on 100%NRB, but now on 100%HFNC. He received dexamethasone 10 mg iv, doxycycline and 1L ns. However since admission patient had been Escalating O2 requirments--> HFNC--> NIPPV. Worsening tachypnea and agitation intermittently. Upgraded to ICU and ultimately intubated on 4/19       PAST MEDICAL & SURGICAL HISTORY:  No pertinent past medical history    S/P shoulder surgery        REVIEW OF SYSTEMS      General:No Weight change/ Fatigue/ HA/Dizzy	    Skin/Breast: No Rashes/ Lesions/ Masses  	  Ophthalmologic: No Blurry vision/ Glaucoma/ Blindness  	  ENMT: No Hearing loss/ Drainage/ Lesions	    Respiratory and Thorax: No Cough/ Wheezing/ SOB/ Hemoptysis/ Sputum production  	  Cardiovascular: No Chest pain/ Palpitations/ Diaphoresis	    Gastrointestinal: No Nausea/ Vomiting/ Constipation/ Appetite Change	    Genitourinary: No Heamturia/ Dysuria/ Frequency change/ Impotence	    Musculoskeletal: No Pain/ Weakness/ Claudication	    Neurological: No Seizures/ TIA/CVA/ Parastesias	    Psychiatric: No Dementia/ Depression/ SI/HI	    Hematology/Lymphatics: No hx of bleeding/ Edema	    Endocrine:	No Hyperglycemia/ Hypoglycemia    Allergic/Immunologic:	 No Anaphylaxis/ Intolerance/ Recent illnesses    MEDICATIONS  (STANDING):  aspirin enteric coated 81 milliGRAM(s) Oral daily  chlorhexidine 0.12% Liquid 15 milliLiter(s) Oral Mucosa every 12 hours  cholecalciferol 2000 Unit(s) Oral daily  cisatracurium Infusion 3 MICROgram(s)/kG/Min (14.8 mL/Hr) IV Continuous <Continuous>  dextrose 40% Gel 15 Gram(s) Oral once  dextrose 5%. 1000 milliLiter(s) (50 mL/Hr) IV Continuous <Continuous>  dextrose 5%. 1000 milliLiter(s) (100 mL/Hr) IV Continuous <Continuous>  dextrose 50% Injectable 25 Gram(s) IV Push once  dextrose 50% Injectable 12.5 Gram(s) IV Push once  dextrose 50% Injectable 25 Gram(s) IV Push once  enoxaparin Injectable 80 milliGRAM(s) SubCutaneous two times a day  fentaNYL   Infusion. 0.5 MICROgram(s)/kG/Hr (4.11 mL/Hr) IV Continuous <Continuous>  glucagon  Injectable 1 milliGRAM(s) IntraMuscular once  insulin lispro (ADMELOG) corrective regimen sliding scale   SubCutaneous every 6 hours  meropenem  IVPB 500 milliGRAM(s) IV Intermittent every 12 hours  methylPREDNISolone sodium succinate Injectable 40 milliGRAM(s) IV Push every 8 hours  multivitamin 1 Tablet(s) Oral daily  norepinephrine Infusion 0.05 MICROgram(s)/kG/Min (7.7 mL/Hr) IV Continuous <Continuous>  pantoprazole  Injectable 40 milliGRAM(s) IV Push daily  propofol Infusion 10 MICROgram(s)/kG/Min (4.93 mL/Hr) IV Continuous <Continuous>  sodium bicarbonate  Infusion 0.183 mEq/kG/Hr (100 mL/Hr) IV Continuous <Continuous>  vasopressin Infusion 0.04 Unit(s)/Min (2.4 mL/Hr) IV Continuous <Continuous>    MEDICATIONS  (PRN):  ALBUTerol    90 MICROgram(s) HFA Inhaler 2 Puff(s) Inhalation every 6 hours PRN Shortness of Breath and/or Wheezing  melatonin 5 milliGRAM(s) Oral at bedtime PRN Insomnia      Allergies    No Known Allergies    Intolerances        SOCIAL HISTORY:    FAMILY HISTORY:  No pertinent family history in first degree relatives        Vital Signs Last 24 Hrs  T(C): 35.9 (01 May 2021 08:13), Max: 39.6 (30 Apr 2021 16:02)  T(F): 96.7 (01 May 2021 08:13), Max: 103.2 (30 Apr 2021 16:02)  HR: 76 (01 May 2021 10:00) (76 - 116)  BP: 107/63 (01 May 2021 10:00) (107/63 - 107/63)  BP(mean): 81 (01 May 2021 10:00) (81 - 81)  RR: 34 (01 May 2021 10:00) (25 - 34)  SpO2: 98% (01 May 2021 10:00) (71% - 98%)    General: WN/WD NAD  Neurology: Awake, nonfocal, ABREU x 4  Eyes: Scleras clear, PERRLA/ EOMI, Gross vision intact  ENT:Gross hearing intact, grossly patent pharynx, no stridor  Neck: Neck supple, trachea midline, No JVD,   Respiratory: CTA B/L, No wheezing, rales, rhonchi  CV: RRR, S1S2, no murmurs, rubs or gallops  Abdominal: Soft, NT, ND +BS,   Extremities: No edema, + peripheral pulses  Skin: No Rashes, Hematoma, Ecchymosis  Lymphatic: No Neck, axilla, groin LAD  Psych: Oriented x 3, normal affect  Incisions:   Tubes:    LABS:                        14.0   19.87 )-----------( 205      ( 01 May 2021 05:51 )             45.5     05-01    140  |  97<L>  |  55.0<H>  ----------------------------<  220<H>  4.6   |  24.0  |  2.47<H>    Ca    6.9<L>      01 May 2021 05:51  Phos  6.8     05-01  Mg     2.1     05-01    TPro  4.2<L>  /  Alb  1.3<L>  /  TBili  3.6<H>  /  DBili  x   /  AST  1700<H>  /  ALT  1624<H>  /  AlkPhos  84  05-01          RADIOLOGY & ADDITIONAL STUDIES:    ASSESSMENT:   65yMalePAST MEDICAL & SURGICAL HISTORY:  No pertinent past medical history    S/P shoulder surgery    HEALTH ISSUES - PROBLEM Dx:  Pneumonia due to COVID-19 virus  Pneumonia due to COVID-19 virus    Acute respiratory failure, unspecified whether with hypoxia or hypercapnia  Acute respiratory failure, unspecified whether with hypoxia or hypercapnia    Acute respiratory distress syndrome (ARDS) due to COVID-19 virus  Acute respiratory distress syndrome (ARDS) due to COVID-19 virus    Cardiac arrest  Cardiac arrest        HEALTH ISSUES - R/O PROBLEM Dx:      PLAN: HPI:  65M no pmh presents 4/16 for worsening shortness of breath. Patient states that has been having fevers, chills and headache for the last 10 days. Both of his sons who are in school recently were quarantining from school due to covid. He states the last few days he's been feeling especially weak and more short of breath. His PCP tried to get him to come in for antibody infusion, but when arrived found to be hypoxic to 60's and ill appearing and was brought to ER.     In ER, pt initially on 100%NRB, but now on 100%HFNC. He received dexamethasone 10 mg iv, doxycycline and 1L ns. However since admission patient had been Escalating O2 requirments--> HFNC--> NIPPV. Worsening tachypnea and agitation intermittently. Upgraded to ICU and ultimately intubated on 4/29 2/2 to respiratory failure with cardiac arrest obtained ROSC in 15 min , s/p  left ptx  pigtail 2/2 to pneum followed by large bore chest tube placed with large air leak.  5/1 Thoracostomy tube appeared kinked on cxr, with no air leak. + air leak from pigtail, Tube readjusted by MICU team and now immediate improvement in hemodynamics and oxygenation seen. Left formal tube with +continuous air leak, and Left pigtail w/o AL.     Subjective  Unable to obtain appropriate HPI as patient is sedated and intubated.     PAST MEDICAL & SURGICAL HISTORY:  No pertinent past medical history    S/P shoulder surgery          MEDICATIONS  (STANDING):  aspirin enteric coated 81 milliGRAM(s) Oral daily  chlorhexidine 0.12% Liquid 15 milliLiter(s) Oral Mucosa every 12 hours  cholecalciferol 2000 Unit(s) Oral daily  cisatracurium Infusion 3 MICROgram(s)/kG/Min (14.8 mL/Hr) IV Continuous <Continuous>  dextrose 40% Gel 15 Gram(s) Oral once  dextrose 5%. 1000 milliLiter(s) (50 mL/Hr) IV Continuous <Continuous>  dextrose 5%. 1000 milliLiter(s) (100 mL/Hr) IV Continuous <Continuous>  dextrose 50% Injectable 25 Gram(s) IV Push once  dextrose 50% Injectable 12.5 Gram(s) IV Push once  dextrose 50% Injectable 25 Gram(s) IV Push once  enoxaparin Injectable 80 milliGRAM(s) SubCutaneous two times a day  fentaNYL   Infusion. 0.5 MICROgram(s)/kG/Hr (4.11 mL/Hr) IV Continuous <Continuous>  glucagon  Injectable 1 milliGRAM(s) IntraMuscular once  insulin lispro (ADMELOG) corrective regimen sliding scale   SubCutaneous every 6 hours  meropenem  IVPB 500 milliGRAM(s) IV Intermittent every 12 hours  methylPREDNISolone sodium succinate Injectable 40 milliGRAM(s) IV Push every 8 hours  multivitamin 1 Tablet(s) Oral daily  norepinephrine Infusion 0.05 MICROgram(s)/kG/Min (7.7 mL/Hr) IV Continuous <Continuous>  pantoprazole  Injectable 40 milliGRAM(s) IV Push daily  propofol Infusion 10 MICROgram(s)/kG/Min (4.93 mL/Hr) IV Continuous <Continuous>  sodium bicarbonate  Infusion 0.183 mEq/kG/Hr (100 mL/Hr) IV Continuous <Continuous>  vasopressin Infusion 0.04 Unit(s)/Min (2.4 mL/Hr) IV Continuous <Continuous>    MEDICATIONS  (PRN):  ALBUTerol    90 MICROgram(s) HFA Inhaler 2 Puff(s) Inhalation every 6 hours PRN Shortness of Breath and/or Wheezing  melatonin 5 milliGRAM(s) Oral at bedtime PRN Insomnia      Allergies    No Known Allergies    Intolerances        SOCIAL HISTORY:    FAMILY HISTORY:  No pertinent family history in first degree relatives        Vital Signs Last 24 Hrs  T(C): 35.9 (01 May 2021 08:13), Max: 39.6 (30 Apr 2021 16:02)  T(F): 96.7 (01 May 2021 08:13), Max: 103.2 (30 Apr 2021 16:02)  HR: 76 (01 May 2021 10:00) (76 - 116)  BP: 107/63 (01 May 2021 10:00) (107/63 - 107/63)  BP(mean): 81 (01 May 2021 10:00) (81 - 81)  RR: 34 (01 May 2021 10:00) (25 - 34)  SpO2: 98% (01 May 2021 10:00) (71% - 98%)    General: ill appearing   Neurology: intubated and sedated, proned   Respiratory: B/L Crackles, ETT in place,  Extremities:+ peripheral pulses  Tubes:  Left formal chest tube continuos + AL and left pigtail     LABS:                        14.0   19.87 )-----------( 205      ( 01 May 2021 05:51 )             45.5     05-01    140  |  97<L>  |  55.0<H>  ----------------------------<  220<H>  4.6   |  24.0  |  2.47<H>    Ca    6.9<L>      01 May 2021 05:51  Phos  6.8     05-01  Mg     2.1     05-01    TPro  4.2<L>  /  Alb  1.3<L>  /  TBili  3.6<H>  /  DBili  x   /  AST  1700<H>  /  ALT  1624<H>  /  AlkPhos  84  05-01          RADIOLOGY & ADDITIONAL STUDIES:  < from: Xray Chest 1 View-PORTABLE IMMEDIATE (Xray Chest 1 View-PORTABLE IMMEDIATE .) (05.01.21 @ 10:43) >      INTERPRETATION:  XR CHEST IMMEDIATE    Single AP view    HISTORY: Follow-up left pneumothorax    Comparison: Same day chest x-ray    The tip of the ET tube is above the abdelrahman.    The NG tube is in the stomach.    Left IJ line tip in the SVC.    Left chest tube with small residual pneumothorax.    The cardiac silhouette is obscured. Diffuse bilateral airspace disease. Persistent left subcutaneous emphysema.    IMPRESSION: Markedly improved left pneumothorax with small residual    < end of copied text >        S/P shoulder surgery    HEALTH ISSUES - PROBLEM Dx:  Pneumonia due to COVID-19 virus      Acute respiratory failure, unspecified whether with hypoxia or hypercapnia      Acute respiratory distress syndrome (ARDS) due to COVID-19 virus    Cardiac arrest          HEALTH ISSUES - R/O PROBLEM Dx:      PLAN: HPI:  65M no pmh presents 4/16 for worsening shortness of breath. Patient states that has been having fevers, chills and headache for the last 10 days. Both of his sons who are in school recently were quarantining from school due to covid. He states the last few days he's been feeling especially weak and more short of breath. His PCP tried to get him to come in for antibody infusion, but when arrived found to be hypoxic to 60's and ill appearing and was brought to ER.     In ER, pt initially on 100%NRB, but now on 100%HFNC. He received dexamethasone 10 mg iv, doxycycline and 1L ns. However since admission patient had been Escalating O2 requirments--> HFNC--> NIPPV. Worsening tachypnea and agitation intermittently. Upgraded to ICU and ultimately intubated on 4/30 2/2 to respiratory failure with cardiac arrest obtained ROSC in 15 min , s/p  left ptx  pigtail 2/2 to pneum followed by large bore chest tube placed with large air leak.  5/1 Thoracostomy tube appeared kinked on cxr, with no air leak. + air leak from pigtail, Tube readjusted by MICU team and now immediate improvement in hemodynamics and oxygenation seen. Left formal tube with +continuous air leak, and Left pigtail w/o AL.     Subjective  Unable to obtain appropriate HPI as patient is sedated and intubated.     PAST MEDICAL & SURGICAL HISTORY:  No pertinent past medical history    S/P shoulder surgery          MEDICATIONS  (STANDING):  aspirin enteric coated 81 milliGRAM(s) Oral daily  chlorhexidine 0.12% Liquid 15 milliLiter(s) Oral Mucosa every 12 hours  cholecalciferol 2000 Unit(s) Oral daily  cisatracurium Infusion 3 MICROgram(s)/kG/Min (14.8 mL/Hr) IV Continuous <Continuous>  dextrose 40% Gel 15 Gram(s) Oral once  dextrose 5%. 1000 milliLiter(s) (50 mL/Hr) IV Continuous <Continuous>  dextrose 5%. 1000 milliLiter(s) (100 mL/Hr) IV Continuous <Continuous>  dextrose 50% Injectable 25 Gram(s) IV Push once  dextrose 50% Injectable 12.5 Gram(s) IV Push once  dextrose 50% Injectable 25 Gram(s) IV Push once  enoxaparin Injectable 80 milliGRAM(s) SubCutaneous two times a day  fentaNYL   Infusion. 0.5 MICROgram(s)/kG/Hr (4.11 mL/Hr) IV Continuous <Continuous>  glucagon  Injectable 1 milliGRAM(s) IntraMuscular once  insulin lispro (ADMELOG) corrective regimen sliding scale   SubCutaneous every 6 hours  meropenem  IVPB 500 milliGRAM(s) IV Intermittent every 12 hours  methylPREDNISolone sodium succinate Injectable 40 milliGRAM(s) IV Push every 8 hours  multivitamin 1 Tablet(s) Oral daily  norepinephrine Infusion 0.05 MICROgram(s)/kG/Min (7.7 mL/Hr) IV Continuous <Continuous>  pantoprazole  Injectable 40 milliGRAM(s) IV Push daily  propofol Infusion 10 MICROgram(s)/kG/Min (4.93 mL/Hr) IV Continuous <Continuous>  sodium bicarbonate  Infusion 0.183 mEq/kG/Hr (100 mL/Hr) IV Continuous <Continuous>  vasopressin Infusion 0.04 Unit(s)/Min (2.4 mL/Hr) IV Continuous <Continuous>    MEDICATIONS  (PRN):  ALBUTerol    90 MICROgram(s) HFA Inhaler 2 Puff(s) Inhalation every 6 hours PRN Shortness of Breath and/or Wheezing  melatonin 5 milliGRAM(s) Oral at bedtime PRN Insomnia      Allergies    No Known Allergies    Intolerances        SOCIAL HISTORY:    FAMILY HISTORY:  No pertinent family history in first degree relatives        Vital Signs Last 24 Hrs  T(C): 35.9 (01 May 2021 08:13), Max: 39.6 (30 Apr 2021 16:02)  T(F): 96.7 (01 May 2021 08:13), Max: 103.2 (30 Apr 2021 16:02)  HR: 76 (01 May 2021 10:00) (76 - 116)  BP: 107/63 (01 May 2021 10:00) (107/63 - 107/63)  BP(mean): 81 (01 May 2021 10:00) (81 - 81)  RR: 34 (01 May 2021 10:00) (25 - 34)  SpO2: 98% (01 May 2021 10:00) (71% - 98%)    General: ill appearing   Neurology: intubated and sedated, proned   Respiratory: B/L Crackles, ETT in place,  Extremities:+ peripheral pulses  Tubes:  Left formal chest tube continuos + AL and left pigtail     LABS:                        14.0   19.87 )-----------( 205      ( 01 May 2021 05:51 )             45.5     05-01    140  |  97<L>  |  55.0<H>  ----------------------------<  220<H>  4.6   |  24.0  |  2.47<H>    Ca    6.9<L>      01 May 2021 05:51  Phos  6.8     05-01  Mg     2.1     05-01    TPro  4.2<L>  /  Alb  1.3<L>  /  TBili  3.6<H>  /  DBili  x   /  AST  1700<H>  /  ALT  1624<H>  /  AlkPhos  84  05-01          RADIOLOGY & ADDITIONAL STUDIES:  < from: Xray Chest 1 View-PORTABLE IMMEDIATE (Xray Chest 1 View-PORTABLE IMMEDIATE .) (05.01.21 @ 10:43) >      INTERPRETATION:  XR CHEST IMMEDIATE    Single AP view    HISTORY: Follow-up left pneumothorax    Comparison: Same day chest x-ray    The tip of the ET tube is above the abdelrahman.    The NG tube is in the stomach.    Left IJ line tip in the SVC.    Left chest tube with small residual pneumothorax.    The cardiac silhouette is obscured. Diffuse bilateral airspace disease. Persistent left subcutaneous emphysema.    IMPRESSION: Markedly improved left pneumothorax with small residual    < end of copied text >        S/P shoulder surgery    HEALTH ISSUES - PROBLEM Dx:  Pneumonia due to COVID-19 virus      Acute respiratory failure, unspecified whether with hypoxia or hypercapnia      Acute respiratory distress syndrome (ARDS) due to COVID-19 virus    Cardiac arrest          HEALTH ISSUES - R/O PROBLEM Dx:      PLAN:

## 2021-05-01 NOTE — GOALS OF CARE CONVERSATION - ADVANCED CARE PLANNING - CONVERSATION DETAILS
Met with patient's sister Bushra at bedside. We spoke extensively for approximately 2 hours about her brother's critical condition, including his tension pneumothorax, Staph bacteremia, cardiac arrest, and multiorgan failure. We discussed that, in addition to his respiratory failure and shock, that he is now suffering from renal and liver failure. We also discussed that he may have suffered brain damage from his cardiac arrest, which we are unable to assess at this time because his critical illness requires that he is in a medically-induced coma in order to oxygenate him.    Bushra asked me if we could try peritoneal or hemodialysis. I explained the difference between the two, and that given how unstable he is, the only kind of dialysis we could potentially try would be continuous renal replacement therapy. Bushra expressed to me that she knows the risks of pursuing dialysis including death, however, she feels that she needs to know in her heart that she tried everything. She called her sister Bina for guidance in the decision-making process, and the three of us spoke extensively of the potential risks and benefits associated with a trial of dialysis, including but not limited to: that the action of placing him supine in order to place a dialysis catheter could lead to death, the time supine could cause him to desaturate and could cause death, that he could seed the catheter from his bacteremia, and that the stress of dialysis could hasten death.     Bushra and Bina were quite torn on the decision. They expressed to me that, knowing their brother's personality, he would want every fighting chance to get back to his two boys. However, Bushra stated he would never want a tracheostomy nor to be a vegetable. With that, I explained that "survival" from covid given his state of critical illness with multiorgan failure could likely include a tracheostomy, ventilator, permanent dialysis, polyneuropathy/paralysis, and residing in a nursing home, though we are not at a stable state to consider those factors at this time.     Bushra asked to speak with the Nephrologist for his opinion to risk stratify, so I brought Dr. Barbour to bedside who agreed that patient would benefit from hemodialysis, which is better initiated when we have a relatively stable window of opportunity, rather than emergently, which would increase the potential for complications to arise. Bushra stated to me that she would not want to take the risks in an emergent situation.    Bushra then requested that I speak with her friend Perla, who is an Internal Medicine NP, who had initially suggested the idea of dialysis to Bushra. I reviewed the case with Perla, who felt that he would die of his renal failure regardless, and that they should take the risks and pursue hemodialysis.     Bushra and iBna are in agreement that we will attempt to initiate dialysis, accepting of the risks that this may hasten death and could cause a cardiac arrest. I also advised them that if he becomes too unstable, we will abort the procedure and he will not be able to start dialysis. We have set a clear plan for a trial of CRRT for ~2-3 days, and reassess his overall response to treatment from a multiorgan perspective, understanding that dialysis will only solve the issue of his renal failure and cannot fix his other issues. Bushra and Bina understand that he may still die from his other medical issues at hand, of which there are many, and if he fails to make an improvement over the next few days or gets worse, we will re-evaluate our treatment plan moving forward.

## 2021-05-01 NOTE — CONSULT NOTE ADULT - PROBLEM SELECTOR PROBLEM 2
Acute respiratory failure, unspecified whether with hypoxia or hypercapnia
Acute hypoxemic respiratory failure due to COVID-19
Acute hypoxemic respiratory failure due to COVID-19

## 2021-05-01 NOTE — PROGRESS NOTE ADULT - SUBJECTIVE AND OBJECTIVE BOX
Family consented  to Initiating RRT,    Lactic acid Remains elevated,    D/W Cordelia ( MICU )    Consent in chart,    To initiate CVVHDF, Once IJ NTDC Is established,    Net Even Fluid Balance,    Heparin in the circuit,    ICU Vital Signs Last 24 Hrs,    T(C): 36.7 (01 May 2021 16:08), Max: 39.6 (2021 17:00)  T(F): 98.1 (01 May 2021 16:08), Max: 103.2 (2021 17:00)  HR: 88 (01 May 2021 16:08) (71 - 981)  ABP: 104/60 (01 May 2021 15:00) (98/55 - 128/68)  ABP(mean): 75 (01 May 2021 15:00) (68 - 96)  RR: 34 (01 May 2021 15:00) (25 - 34)  SpO2: 92% (01 May 2021 16:08) (71% - 100%)    138    |  94<L>  |  56.0<H>  ----------------------------<  277<H>  Ca: 7.0<L> (01 May 2021 15:10)  4.5     |  27.0   |  2.81<H>      eGFR if Non : 22 *L*    TPro  3.9<L>  /  Alb  1.3<L>  /  TBili  3.2<H>  /  DBili  x      /  AST  1360<H>  /  ALT  1556<H>  /  AlkPhos  80     01 May 2021 10:38                                  14.0   19.87<H> )-----------( 205      ( 01 May 2021 05:51 )                       45.5     Phos:5.6 mg/dL<H> M.9 mg/dL  ( @ 15:10)    To Re Titrate IV Antibiotics,

## 2021-05-01 NOTE — PROGRESS NOTE ADULT - SUBJECTIVE AND OBJECTIVE BOX
HPI: 64 yo male admitted with COVID-19 viral pneumonia, with acute hypoxemic respiratory failure and severe ARDS, complicated by cardiac arrest 4/30, septic vs obstructive shock related to tension PTX with ATN, ischemic hepatitis, lactic acidosis      Vent day #2  p:F ratio = 49        Sedated on Propofol and Fentanyl, paralyzed with Nimbex  Proned at 0300  CXR reviewed, noted to have residual L PTX despite 1 pigtail and 1 formal ctx to suction  In 3 pressor shock  Thoracostomy tube appeared kinked on cxr, with no air leak. + air leak from pigtail.  Suspected tension ptx: I decreased PEEP +15 --> +3 and adjusted placement of thoracostomy tube, now with +continuous air leak, immediate improvement in hemodynamics and oxygenation  Able to wean off Phenylephrine and titrate down on Norepinephrine, remains on Vasopressin  Ppk improved from 50 --> 25  Repeat ABG with improved paO2 49 --> 73      T(C): 35.9 (05-01-21 @ 08:13), Max: 39.6 (04-30-21 @ 16:02)  HR: 76 (05-01-21 @ 10:00) (76 - 116)  BP: 107/63 (05-01-21 @ 10:00) (107/63 - 107/63)  RR: 34 (05-01-21 @ 10:00) (25 - 34)  SpO2: 98% (05-01-21 @ 10:00) (71% - 98%)          04-30-21 @ 07:01  -  05-01-21 @ 07:00  --------------------------------------------------------  IN: 8155.5 mL / OUT: 363 mL / NET: 7792.5 mL        Mode: AC/ CMV (Assist Control/ Continuous Mandatory Ventilation), RR (machine): 34, TV (machine): 450, FiO2: 100, PEEP: 15, MAP: 25, PIP: 43        RADIOLOGY: CXR with L tension PTX        EXAM:   Gen: prone positioned  Lungs: b/l breath sounds, L formal chest tube and L pigtail in place, + air leak from formal ctx only   CV: NSR  GI: unable to assess (patient proned)  Ext: Gen anasarca  Neuro: paralyzed        Vital signs reviewed and physical exam performed where pertinent and urgently required.    Lab/radiology studies/ABG/meds reviewed and interpreted into the assessment and treatment plan.            Assessment/Plan/Therapeutic interventions:    Neuro: Deep sedation and neuromuscular blockade as needed to facilitate ventilation and improve compliance to optimize oxygenation    CV: Cardiac arrest with ~15 minute downtime, most likely primary respiratory etiology related to PTX. Three pressor shock Levo/Vaso/Anshu - able to wean off Anshu with resolution of tension ptx. Still remains on Levo / Vaso, actively titrating to maintain MAP >65.     Pulm: Spontaneous L tension PTX s/p pigtail catheter and then surgical chest tube. Now with continuous air leak from surgical ctx, continue ctxs to suction -20. Repeat CXR pending, may need third ctx if he remains with residual ptx. CT surgery consulted. PEEP weaned from +15 --> +3 due to ptx, now increased back to +5. Repeat ABG with improved oxygenation. Airway pressures improved. Low Tv lung protective strategy 4-8 cc/kg IBW. Manipulating vent to maintain paO2 55-80 and pH >7.15 with permissive hypercapnea. Prone positioned at ~0300.    GI: Stress ulcer GI ppx. NPO while paralyzed. Check TG in AM while on Propofol.    Renal: SANNA suspected ATN due to hypoperfusion during shock state / cardiac arrest. Oliguric, lactic acidosis, monitoring in response to resuscitation. No HD per family's wishes.    Heme: Full dose Lovenox     ID: Remains febrile, on Meropenem. Received 1 dose of Vancomycin, given renal failure will check trough and continue to dose by level. BCx sent yesterday and are pending, fungal workup sent this AM    Endo: Aggressive glycemic control to keep FS glucose to <180mg/dl while critically ill.           COVID 19 specific considerations and therapeutic options based on the available and rapidly changing literature.    98 minutes of critical care time spent in the management of this critically ill COVID-19 patient suffering from multisystem organ failure with continuous assessments and interventions based on the interpretation of multiple databases. Critical care time not inclusive of independent time spent on procedures performed. HPI: 66 yo male admitted with COVID-19 viral pneumonia, with acute hypoxemic respiratory failure and severe ARDS, complicated by cardiac arrest 4/30, septic vs obstructive shock related to tension PTX with ATN, ischemic hepatitis, lactic acidosis      Vent day #2  p:F ratio = 49        Sedated on Propofol and Fentanyl, paralyzed with Nimbex  Proned at 0300  CXR reviewed, noted to have residual L PTX despite 1 pigtail and 1 formal ctx to suction -20  In 3 pressor shock  Thoracostomy tube appeared kinked on cxr, with no air leak. + air leak from pigtail.    Suspected tension ptx: I decreased PEEP +15 --> +3 and adjusted placement of thoracostomy tube, now with +continuous air leak, immediate improvement in hemodynamics and oxygenation  Able to wean off Phenylephrine and titrate down on Norepinephrine, remains on Vasopressin  Ppk improved from 50 --> 25  Repeat ABG with improved paO2 49 --> 73        T(C): 35.9 (05-01-21 @ 08:13), Max: 39.6 (04-30-21 @ 16:02)  HR: 76 (05-01-21 @ 10:00) (76 - 116)  BP: 107/63 (05-01-21 @ 10:00) (107/63 - 107/63)  RR: 34 (05-01-21 @ 10:00) (25 - 34)  SpO2: 98% (05-01-21 @ 10:00) (71% - 98%)          04-30-21 @ 07:01  -  05-01-21 @ 07:00  --------------------------------------------------------  IN: 8155.5 mL / OUT: 363 mL / NET: 7792.5 mL        Mode: AC/ CMV (Assist Control/ Continuous Mandatory Ventilation), RR (machine): 34, TV (machine): 450, FiO2: 100, PEEP: 15, MAP: 25, PIP: 43        RADIOLOGY: CXR with L tension PTX        EXAM:   Gen: prone positioned  Lungs: b/l breath sounds, L formal chest tube and L pigtail in place, + air leak from formal ctx only   CV: NSR  GI: unable to assess (patient proned)  Ext: Gen anasarca  Neuro: paralyzed        Vital signs reviewed and physical exam performed where pertinent and urgently required.    Lab/radiology studies/ABG/meds reviewed and interpreted into the assessment and treatment plan.            Assessment/Plan/Therapeutic interventions:    Neuro: Deep sedation and neuromuscular blockade as needed to facilitate ventilation and improve compliance to optimize oxygenation    CV: Cardiac arrest with ~15 minute downtime, most likely primary respiratory etiology related to PTX. Three pressor shock Levo/Vaso/Anshu - able to wean off Anshu with resolution of tension ptx. Still remains on Levo / Vaso, actively titrating to maintain MAP >65.     Pulm: Spontaneous L tension PTX s/p pigtail catheter and then surgical chest tube. Now with continuous air leak from surgical ctx, continue ctxs to suction -20. Repeat CXR pending, may need third ctx if he remains with residual ptx. CT surgery consulted. PEEP weaned from +15 --> +3 due to ptx, now increased back to +5. Repeat ABG with improved oxygenation. Airway pressures now acceptable. Low Tv lung protective strategy 4-8 cc/kg IBW. Manipulating vent to maintain paO2 55-80 and pH >7.15 with permissive hypercapnea. Prone positioned at ~0300. Continue steroids.    GI: Stress ulcer GI ppx. NPO while paralyzed. Check TG in AM while on Propofol.    Renal: SANNA suspected ATN due to hypoperfusion during shock state / cardiac arrest. Oliguric, lactic acidosis, monitoring in response to resuscitation. No HD per family's wishes.    Heme: Full dose Lovenox     ID: Remains febrile, on Meropenem. Received 1 dose of Vancomycin, given renal failure will check trough and continue to dose by level. BCx sent yesterday and are pending, fungal workup sent this AM    Endo: Aggressive glycemic control to keep FS glucose to <180mg/dl while critically ill.           COVID 19 specific considerations and therapeutic options based on the available and rapidly changing literature.    98 minutes of critical care time spent in the management of this critically ill COVID-19 patient suffering from multisystem organ failure with continuous assessments and interventions based on the interpretation of multiple databases. Critical care time not inclusive of independent time spent on procedures performed.

## 2021-05-01 NOTE — CONSULT NOTE ADULT - PROBLEM SELECTOR RECOMMENDATION 2
Pt was on high flow at 100% and NRB  Currently on NIPPV bipap at 10/5 100% he is getting good volumes and 02sat currently 96%  AT this time he does not require ICU level of care but if worsens please recall  Would recommend abg and repeat CXR today
pt Proned   ID following   As per MICU team
Per Critical Care

## 2021-05-01 NOTE — CONSULT NOTE ADULT - ASSESSMENT
66 yo male admitted on 4/16 with COVID19 viral pneumonia, now with acute respiratory failure requiring intubation, s/p cardiac arrest w/ ROSC in 15 min, s/p  left ptx  pigtail 2/2 to pneum followed by large bore chest tube placed with large air leak.  5/1 Thoracostomy tube appeared kinked on cxr, with no air leak. + air leak from pigtail, Tube readjusted by MICU team and now immediate improvement in hemodynamics and oxygenation seen. Left formal tube with +continuous air leak, and Left pigtail w/o AL, CTS consulted to follow Chest tubes X2.        64 yo male admitted on 4/16 with COVID19 viral pneumonia, now with acute respiratory failure requiring intubation 4/30, s/p cardiac arrest w/ ROSC in 15 min, s/p  left ptx  pigtail 2/2 to pneum followed by large bore chest tube placed with large air leak.  5/1 Thoracostomy tube appeared kinked on cxr, with no air leak. + air leak from pigtail, Tube readjusted by MICU team and now immediate improvement in hemodynamics and oxygenation seen. Left formal tube with +continuous air leak, and Left pigtail w/o AL, CTS consulted to follow Chest tubes X2.

## 2021-05-01 NOTE — PROGRESS NOTE ADULT - ASSESSMENT
Summary of KDIGO Recommendation Statements: Dialysis Interventions for Treatment of SANNA    5.1.1: Initiate RRT emergently when life-threatening changes in fluid, electrolyte, and acid-base balance exist. (Not Graded)  5.1.2: Consider the broader clinical context, the presence of conditions that can be modified with RRT, and trends of laboratory  tests—rather than single BUN and creatinine thresholds alone—when making the decision to start RRT. (Not Graded)  5.2.1: Discontinue RRT when it is no longer required, either because intrinsic kidney function has recovered to the point that it is  adequate to meet patient needs, or because RRT is no longer consistent with the goals of care. (Not Graded)  5.2.2: We suggest not using diuretics to enhance kidney function recovery, or to reduce the duration or frequency of RRT. (2B)  5.3.1: In a patient with SANNA requiring RRT, base the decision to use anticoagulation for RRT on assessment of the patient’s potential  risks and benefits from anticoagulation (see Figure 17). (Not Graded)  5.3.1.1: We recommend using anticoagulation during RRT in SANNA if a patient does not have an increased bleeding risk or  impaired coagulation and is not already receiving systemic anticoagulation. (1B)  5.3.2: For patients without an increased bleeding risk or impaired coagulation and not already receiving effective systemic  anticoagulation, we suggest the followin.3.2.1: For anticoagulation in intermittent RRT, we recommend using either unfractionated or low-molecular-weight heparin,  rather than other anticoagulants. (1C)  5.3.2.2: For anticoagulation in CRRT, we suggest using regional citrate anticoagulation rather than heparin in patients who do  not have contraindications for citrate. (2B)  5.3.2.3: For anticoagulation during CRRT in patients who have contraindications for citrate, we suggest using either  unfractionated or low-molecular-weight heparin, rather than other anticoagulants. (2C)  5.3.3: For patients with increased bleeding risk who are not receiving anticoagulation, we suggest the following for anticoagulation  during RRT:  5.3.3.1: We suggest using regional citrate anticoagulation, rather than no anticoagulation, during CRRT in a patient without  contraindications for citrate. (2C)  5.3.3.2: We suggest avoiding regional heparinization during CRRT in a patient with increased risk of bleeding. (2C)  5.3.4: In a patient with heparin-induced thrombocytopenia (HIT), all heparin must be stopped and we recommend using direct  thrombin inhibitors (such as argatroban) or Factor Xa inhibitors (such as danaparoid or fondaparinux) rather than other or no  anticoagulation during RRT. (1A)  5.3.4.1: In a patient with HIT who does not have severe liver failure, we suggest using argatroban rather than other thrombin  or Factor Xa inhibitors during RRT. (2C)  5.4.1: We suggest initiating RRT in patients with SANNA via an uncuffed nontunneled dialysis catheter, rather than a tunneled catheter.(2D)  5.4.2: When choosing a vein for insertion of a dialysis catheter in patients with SANNA, consider these preferences (Not Graded):  ? First choice: right jugular vein;  ? Second choice: femoral vein;  ? Third choice: left jugular vein;  ? Last choice: subclavian vein with preference for the dominant side.  5.4.3: We recommend using ultrasound guidance for dialysis catheter insertion. (1A)  5.4.4: We recommend obtaining a chest radiograph promptly after placement and before first use of an internal jugular or subclavian  dialysis catheter. (1B)  5.4.5: We suggest not using topical antibiotics over the skin insertion site of a nontunneled dialysis catheter in ICU patients with SANNA  requiring RRT. (2C)  5.4.6: We suggest not using antibiotic locks for prevention of catheter-related infections of nontunneled dialysis catheters in SANNA  requiring RRT. (2C)  5.5.1: We suggest to use dialyzers with a biocompatible membrane for IHD and CRRT in patients with SANNA. (2C)  5.6.1: Use continuous and intermittent RRT as complementary therapies in SANNA patients. (Not Graded)  5.6.2: We suggest using CRRT, rather than standard intermittent RRT, for hemodynamically unstable patients. (2B)  5.6.3: We suggest using CRRT, rather than intermittent RRT, for SANNA patients with acute brain injury or other causes of increased  intracranial pressure or generalized brain edema. (2B)  5.7.1: We suggest using bicarbonate, rather than lactate, as a buffer in dialysate and replacement fluid for RRT in patients with SANNA. (2C)  5.7.2: We recommend using bicarbonate, rather than lactate, as a buffer in dialysate and replacement fluid for RRT in patients with  SANNA and circulatory shock. (1B)  5.7.3: We suggest using bicarbonate, rather than lactate, as a buffer in dialysate and replacement fluid for RRT in patients with SANNA  and liver failure and/or lactic acidemia. (2B)  5.7.4: We recommend that dialysis fluids and replacement fluids in patients with SANNA, at a minimum, comply with American  Association of Medical Instrumentation (AAMI) standards regarding contamination with bacteria and endotoxins. (1B)  5.8.1: The dose of RRT to be delivered should be prescribed before starting each session of RRT. (Not Graded) We recommend  frequent assessment of the actual delivered dose in order to adjust the prescription. (1B)  5.8.2: Provide RRT to achieve the goals of electrolyte, acid-base, solute, and fluid balance that will meet the patient’s needs. (Not  Graded)  5.8.3: We recommend delivering a Kt/V of 3.9 per week when using intermittent or extended RRT in SANNA. (1A)  5.8.4: We recommend delivering an effluent volume of 20-25 mL/kg/h for CRRT in SANNA (1A). This will usually require a higher  prescription of effluent volume. (Not Graded)  Abbreviations: SANNA, acute kidney injury; BUN, blood urea nitrogen; CRRT, continuous renal replacement therapy; ICU, intensive  care unit; RRT, renal replacement therapy.    Reproduced with permission of KDIGO from the KDIGO Clinical Practice Guideline for Acute Kidney Injury.

## 2021-05-01 NOTE — CONSULT NOTE ADULT - SUBJECTIVE AND OBJECTIVE BOX
HPI:  65M no pmh presents 4/16 for worsening shortness of breath. Patient states that has been having fevers, chills and headache for the last 10 days. Both of his sons who are in school recently were quarantining from school due to covid. He states the last few days he's been feeling especially weak and more short of breath. His PCP tried to get him to come in for antibody infusion, but when arrived found to be hypoxic to 60's and ill appearing and was brought to ER.     In ER, pt initially on 100%NRB, but now on 100%HFNC. He received dexamethasone 10 mg iv, doxycycline and 1L ns. However since admission patient had been Escalating O2 requirments--> HFNC--> NIPPV. Worsening tachypnea and agitation intermittently. Upgraded to ICU and ultimately intubated on 4/30 2/2 to respiratory failure with cardiac arrest obtained ROSC in 15 min , s/p  left ptx  pigtail 2/2 to pneum followed by large bore chest tube placed with large air leak.  5/1 Thoracostomy tube appeared kinked on cxr, with no air leak. + air leak from pigtail, Tube readjusted by MICU team and now immediate improvement in hemodynamics and oxygenation seen. Left formal tube with +continuous air leak, and Left pigtail w/o AL.     Subjective  Unable to obtain appropriate HPI as patient is sedated and intubated.     PAST MEDICAL & SURGICAL HISTORY:  No pertinent past medical history    S/P shoulder surgery    MEDICATIONS  (STANDING):  aspirin enteric coated 81 milliGRAM(s) Oral daily  chlorhexidine 0.12% Liquid 15 milliLiter(s) Oral Mucosa every 12 hours  cholecalciferol 2000 Unit(s) Oral daily  cisatracurium Infusion 3 MICROgram(s)/kG/Min (14.8 mL/Hr) IV Continuous <Continuous>  dextrose 40% Gel 15 Gram(s) Oral once  dextrose 5%. 1000 milliLiter(s) (50 mL/Hr) IV Continuous <Continuous>  dextrose 5%. 1000 milliLiter(s) (100 mL/Hr) IV Continuous <Continuous>  dextrose 50% Injectable 25 Gram(s) IV Push once  dextrose 50% Injectable 12.5 Gram(s) IV Push once  dextrose 50% Injectable 25 Gram(s) IV Push once  enoxaparin Injectable 80 milliGRAM(s) SubCutaneous two times a day  fentaNYL   Infusion. 0.5 MICROgram(s)/kG/Hr (4.11 mL/Hr) IV Continuous <Continuous>  glucagon  Injectable 1 milliGRAM(s) IntraMuscular once  insulin lispro (ADMELOG) corrective regimen sliding scale   SubCutaneous every 6 hours  meropenem  IVPB 500 milliGRAM(s) IV Intermittent every 12 hours  methylPREDNISolone sodium succinate Injectable 40 milliGRAM(s) IV Push every 8 hours  multivitamin 1 Tablet(s) Oral daily  norepinephrine Infusion 0.05 MICROgram(s)/kG/Min (7.7 mL/Hr) IV Continuous <Continuous>  pantoprazole  Injectable 40 milliGRAM(s) IV Push daily  propofol Infusion 10 MICROgram(s)/kG/Min (4.93 mL/Hr) IV Continuous <Continuous>  sodium bicarbonate  Infusion 0.183 mEq/kG/Hr (100 mL/Hr) IV Continuous <Continuous>  vasopressin Infusion 0.04 Unit(s)/Min (2.4 mL/Hr) IV Continuous <Continuous>    MEDICATIONS  (PRN):  ALBUTerol    90 MICROgram(s) HFA Inhaler 2 Puff(s) Inhalation every 6 hours PRN Shortness of Breath and/or Wheezing  melatonin 5 milliGRAM(s) Oral at bedtime PRN Insomnia    Allergies    No Known Allergies    SOCIAL HISTORY: Lost wife, 2 Children,     FAMILY HISTORY:  No pertinent family history in first degree relatives    Vital Signs Last 24 Hrs,    T(C): 35.9 (01 May 2021 08:13), Max: 39.6 (30 Apr 2021 16:02)  T(F): 96.7 (01 May 2021 08:13), Max: 103.2 (30 Apr 2021 16:02)  HR: 76 (01 May 2021 10:00) (76 - 116)  BP: 107/63 (01 May 2021 10:00) (107/63 - 107/63)  BP(mean): 81 (01 May 2021 10:00) (81 - 81)  RR: 34 (01 May 2021 10:00) (25 - 34)  SpO2: 98% (01 May 2021 10:00) (71% - 98%)    General: ill appearing   Neurology: intubated and sedated, proned   Respiratory: B/L Crackles, ETT in place,  Extremities:+ peripheral pulses  Tubes:  Left formal chest tube continuos + AL and left pigtail     LABS:                        14.0   19.87 )-----------( 205      ( 01 May 2021 05:51 )             45.5     05-01    140  |  97<L>  |  55.0<H>  ----------------------------<  220<H>  4.6   |  24.0  |  2.47<H>    Ca    6.9<L>      01 May 2021 05:51  Phos  6.8     05-01  Mg     2.1     05-01    TPro  4.2<L>  /  Alb  1.3<L>  /  TBili  3.6<H>  /  DBili  x   /  AST  1700<H>  /  ALT  1624<H>  /  AlkPhos  84  05-01    RADIOLOGY & ADDITIONAL STUDIES:    Xray Chest 1 View-PORTABLE IMMEDIATE (Xray Chest 1 View-PORTABLE IMMEDIATE .) (05.01.21 @ 10:43)       INTERPRETATION:  XR CHEST IMMEDIATE    Single AP view    HISTORY: Follow-up left pneumothorax    Comparison: Same day chest x-ray    The tip of the ET tube is above the abdelrahman.    The NG tube is in the stomach.    Left IJ line tip in the SVC.    Left chest tube with small residual pneumothorax.    The cardiac silhouette is obscured. Diffuse bilateral airspace disease. Persistent left subcutaneous emphysema.    IMPRESSION: Markedly improved left pneumothorax with small residual      S/P shoulder surgery    HEALTH ISSUES - PROBLEM Dx:  Pneumonia due to COVID-19 virus    Acute respiratory failure, unspecified whether with hypoxia or hypercapnia    Acute respiratory distress syndrome (ARDS) due to COVID-19 virus    Cardiac arrest    64 yo male admitted on 4/16 with COVID19 viral pneumonia, now with acute respiratory failure requiring intubation 4/30, s/p cardiac arrest w/ ROSC in 15 min, s/p  left ptx  pigtail 2/2 to pneum followed by large bore chest tube placed with large air leak.  5/1 Thoracostomy tube appeared kinked on cxr, with no air leak. + air leak from pigtail, Tube readjusted by MICU team and now immediate improvement in hemodynamics and oxygenation seen. Left formal tube with +continuous air leak, and Left pigtail w/o AL, CTS consulted to follow Chest tubes X2.        Problem/Recommendation - 1:    Problem: Pneumothorax, left.    Pt with left formal chest tube with continuous AL to suction and Left pigtail no AL to suction,        Problem/Recommendation - 2:  ·  Problem: Acute hypoxemic respiratory failure due to COVID-19.  Proned,  .      Problem/Recommendation - 3:  ·  Problem: Pneumonia due to COVID-19 virus.

## 2021-05-01 NOTE — PROGRESS NOTE ADULT - SUBJECTIVE AND OBJECTIVE BOX
[facility]  INFECTIOUS DISEASES AND INTERNAL MEDICINE at [place]  =======================================================  Jose G Avilez MD  Diplomates American Board of Internal Medicine and Infectious Diseases  Tel: [phone]      Fax: [phone]  =======================================================    BAILEE COPLEAND [id number]    Follow up: COVID 19     Remains on vent     Had cardiac arrest and intubated, requiring Chest tube placement 4/30    s/p Actemra 4/20      Allergies:  No Known Allergies      REVIEW OF SYSTEMS:  unable to obtain due to medical condition       Physical Exam:  GEN: sedated  HEENT: normocephalic and atraumatic.  Anicteric   NECK: Supple.   LUNGS: diffuse rhonchi   HEART: Regular rate and rhythm   ABDOMEN: Soft, nontender, and nondistended.  Positive bowel sounds.    : Hernandez   EXTREMITIES: trace edema.  MSK: no joint swelling  NEUROLOGIC: Sedated   PSYCHIATRIC: sedated   SKIN: No Rash      Vitals:  T(F): 96.7 (01 May 2021 08:13), Max: 103.2 (30 Apr 2021 16:02)  HR: 76 (01 May 2021 10:00)  BP: 107/63 (01 May 2021 10:00)  RR: 34 (01 May 2021 10:00)  SpO2: 98% (01 May 2021 10:00) (71% - 98%)  temp max in last 48H T(F): , Max: 103.2 (04-30-21 @ 16:02)      Current Antibiotics:  meropenem  IVPB 500 milliGRAM(s) IV Intermittent every 12 hours    Other medications:  aspirin enteric coated 81 milliGRAM(s) Oral daily  chlorhexidine 0.12% Liquid 15 milliLiter(s) Oral Mucosa every 12 hours  cholecalciferol 2000 Unit(s) Oral daily  cisatracurium Infusion 3 MICROgram(s)/kG/Min IV Continuous <Continuous>  dextrose 40% Gel 15 Gram(s) Oral once  dextrose 5%. 1000 milliLiter(s) IV Continuous <Continuous>  dextrose 5%. 1000 milliLiter(s) IV Continuous <Continuous>  dextrose 50% Injectable 25 Gram(s) IV Push once  dextrose 50% Injectable 12.5 Gram(s) IV Push once  dextrose 50% Injectable 25 Gram(s) IV Push once  enoxaparin Injectable 80 milliGRAM(s) SubCutaneous two times a day  fentaNYL   Infusion. 0.5 MICROgram(s)/kG/Hr IV Continuous <Continuous>  glucagon  Injectable 1 milliGRAM(s) IntraMuscular once  insulin lispro (ADMELOG) corrective regimen sliding scale   SubCutaneous every 6 hours  methylPREDNISolone sodium succinate Injectable 40 milliGRAM(s) IV Push every 8 hours  multivitamin 1 Tablet(s) Oral daily  norepinephrine Infusion 0.05 MICROgram(s)/kG/Min IV Continuous <Continuous>  pantoprazole  Injectable 40 milliGRAM(s) IV Push daily  propofol Infusion 10 MICROgram(s)/kG/Min IV Continuous <Continuous>  sodium bicarbonate  Infusion 0.183 mEq/kG/Hr IV Continuous <Continuous>  vasopressin Infusion 0.04 Unit(s)/Min IV Continuous <Continuous>                 14.0   19.87 )-----------( 205      ( 01 May 2021 05:51 )             45.5     05-01    140  |  97<L>  |  55.0<H>  ----------------------------<  220<H>  4.6   |  24.0  |  2.47<H>    Ca    6.9<L>      01 May 2021 05:51  Phos  6.8     05-01  Mg     2.1     05-01    TPro  4.2<L>  /  Alb  1.3<L>  /  TBili  3.6<H>  /  DBili  x   /  AST  1700<H>  /  ALT  1624<H>  /  AlkPhos  84  05-01      RECENT CULTURES:  04-19 @ 18:14 .Blood Blood-Peripheral     No growth at 5 days.      WBC Count: 19.87 K/uL (05-01-21 @ 05:51)  WBC Count: 22.11 K/uL (04-30-21 @ 18:37)  WBC Count: 30.63 K/uL (04-30-21 @ 05:11)  WBC Count: 8.99 K/uL (04-28-21 @ 05:30)  WBC Count: 7.49 K/uL (04-27-21 @ 05:06)    Creatinine, Serum: 2.47 mg/dL (05-01-21 @ 05:51)  Creatinine, Serum: 1.95 mg/dL (04-30-21 @ 18:37)  Creatinine, Serum: 1.55 mg/dL (04-30-21 @ 12:01)  Creatinine, Serum: 1.02 mg/dL (04-30-21 @ 05:11)  Creatinine, Serum: 0.52 mg/dL (04-28-21 @ 05:30)  Creatinine, Serum: 0.52 mg/dL (04-27-21 @ 05:06)    C-Reactive Protein, Serum: 368 mg/L (05-01-21 @ 05:50)  C-Reactive Protein, Serum: 123 mg/L (04-30-21 @ 05:11)  C-Reactive Protein, Serum: 15 mg/L (04-29-21 @ 07:50)  C-Reactive Protein, Serum: 16 mg/L (04-28-21 @ 05:30)  C-Reactive Protein, Serum: 27 mg/L (04-27-21 @ 05:07)  C-Reactive Protein, Serum: 40 mg/L (04-26-21 @ 04:46)  C-Reactive Protein, Serum: 66 mg/L (04-25-21 @ 04:53)  C-Reactive Protein, Serum: 154 mg/L (04-24-21 @ 04:43)  C-Reactive Protein, Serum: 134 mg/L (04-23-21 @ 06:56)  C-Reactive Protein, Serum: 128 mg/L (04-22-21 @ 07:09)  C-Reactive Protein, Serum: 174 mg/L (04-21-21 @ 11:00)  C-Reactive Protein, Serum: 211 mg/L (04-20-21 @ 05:41)    Ferritin, Serum: 57195 ng/mL (05-01-21 @ 05:50)  Ferritin, Serum: 4325 ng/mL (04-30-21 @ 05:11)  Ferritin, Serum: 1506 ng/mL (04-29-21 @ 07:50)  Ferritin, Serum: 1254 ng/mL (04-28-21 @ 05:30)  Ferritin, Serum: 1203 ng/mL (04-27-21 @ 05:07)  Ferritin, Serum: 1177 ng/mL (04-26-21 @ 04:46)  Ferritin, Serum: 1326 ng/mL (04-25-21 @ 04:53)  Ferritin, Serum: 2343 ng/mL (04-24-21 @ 04:43)  Ferritin, Serum: 1279 ng/mL (04-23-21 @ 06:56)  Ferritin, Serum: 1254 ng/mL (04-22-21 @ 07:09)  Ferritin, Serum: 1328 ng/mL (04-21-21 @ 11:00)  Ferritin, Serum: 1075 ng/mL (04-20-21 @ 05:41)  Ferritin, Serum: 829 ng/mL (04-16-21 @ 18:00)    Procalcitonin, Serum: 0.12 ng/mL (04-27-21 @ 05:06)  Procalcitonin, Serum: 0.67 ng/mL (04-19-21 @ 18:07)     Rapid RVP Result: NotDetec (04-16-21 @ 18:27)  SARS-CoV-2: Detected (04-16-21 @ 18:27)        < from: CT Angio Chest w/ IV Cont (04.21.21 @ 18:28) >  EXAM:  CT ANGIO CHEST (W)AW IC                          PROCEDURE DATE:  04/21/2021      INTERPRETATION:  CLINICAL INFORMATION: COVID-19 positive with deep vein thrombosis    COMPARISON: Same day chest x-ray, chest CT 3/29/2019    CONTRAST/COMPLICATIONS:  IV Contrast: Omnipaque 350  59 cc administered   41 cc discarded  Oral Contrast: NONE  Complications: None reported at time of study completion    PROCEDURE:  CT Angiography of the Chest.  Sagittal and coronal reformats were performed as well as 3D (MIP) reconstructions.    FINDINGS:    PULMONARY ARTERIES: No pulmonary embolism to the segmental branches. Limited visualization of subsegmental branches secondary to respiratory motion.    LUNGS AND LARGE AIRWAYS: The central airways are patent. Bilateral groundglass opacities compatible with COVID-19 pneumonia. Elevated left hemidiaphragm with left basilar atelectasis.  PLEURA: No pleural abnormality.  VESSELS: Normal caliber aorta.  HEART: Normal heart size. No pericardial effusion.  MEDIASTINUM AND MACHELLE: No adenopathy.  CHEST WALL AND LOWER NECK: No masses.  VISUALIZED UPPER ABDOMEN: Limited visualization is unremarkable.  BONES: No acute bony abnormality.    IMPRESSION:  *  No pulmonary embolism to the segmental branches. Limited visualization of subsegmental branches secondary to respiratory motion.  *  Bilateral groundglass opacities compatible with COVID-19 pneumonia. Elevated left hemidiaphragm with left basilar atelectasis.    < end of copied text >

## 2021-05-02 NOTE — PROCEDURE NOTE - NSPROCDETAILS_GEN_ALL_CORE
guidewire recovered/lumen(s) aspirated and flushed/sterile dressing applied/ultrasound guidance with use of sterile gel and probe cove
Seldinger technique
patient pre-oxygenated, tube inserted, placement confirmed
dressing applied/secured in place/sterile dressing applied
guidewire recovered/lumen(s) aspirated and flushed/sterile dressing applied/sterile technique, catheter placed/ultrasound guidance with use of sterile gel and probe cove
location identified, draped/prepped, sterile technique used, needle inserted/introduced/positive blood return obtained via catheter/connected to a pressurized flush line/sutured in place/hemostasis with direct pressure, dressing applied/Seldinger technique/all materials/supplies accounted for at end of procedure
Seldinger technique/dressing applied/secured in place/sterile dressing applied/thoracostomy tube placed percutaneously

## 2021-05-02 NOTE — PROGRESS NOTE ADULT - SUBJECTIVE AND OBJECTIVE BOX
[facility]  INFECTIOUS DISEASES AND INTERNAL MEDICINE at [place]  =======================================================  Jose G Avilez MD  Diplomates American Board of Internal Medicine and Infectious Diseases  Tel: [phone]      Fax: [phone]  =======================================================    BAILEE COPELAND [id number]    Follow up: COVID 19     Remains on vent     Had cardiac arrest and intubated, requiring Chest tube placement 4/30    s/p Actemra 4/20      Allergies:  No Known Allergies      REVIEW OF SYSTEMS:  unable to obtain due to medical condition       Physical Exam:  GEN: sedated  HEENT: normocephalic and atraumatic.  Anicteric   NECK: Supple.   LUNGS: diffuse rhonchi   HEART: Regular rate and rhythm   ABDOMEN: Soft, nontender, and nondistended.  Positive bowel sounds.    : Ehrnandez   EXTREMITIES: trace edema.  MSK: no joint swelling  NEUROLOGIC: Sedated   PSYCHIATRIC: sedated   SKIN: No Rash      Vitals:    T(F): 96.4 (02 May 2021 02:00), Max: 99 (01 May 2021 18:00)  HR: 54 (02 May 2021 08:00)  BP: --  RR: 34 (02 May 2021 08:00)  SpO2: 81% (02 May 2021 08:00) (81% - 99%)  temp max in last 48H T(F): , Max: 103.2 (04-30-21 @ 16:02)    Current Antibiotics:  meropenem  IVPB 1000 milliGRAM(s) IV Intermittent every 8 hours    Other medications:  aspirin enteric coated 81 milliGRAM(s) Oral daily  calcium gluconate IVPB 1 Gram(s) IV Intermittent every 6 hours  chlorhexidine 0.12% Liquid 15 milliLiter(s) Oral Mucosa every 12 hours  chlorhexidine 4% Liquid 1 Application(s) Topical <User Schedule>  cholecalciferol 2000 Unit(s) Oral daily  cisatracurium Infusion 3 MICROgram(s)/kG/Min IV Continuous <Continuous>  CRRT Treatment    <Continuous>  dextrose 40% Gel 15 Gram(s) Oral once  dextrose 5%. 1000 milliLiter(s) IV Continuous <Continuous>  dextrose 5%. 1000 milliLiter(s) IV Continuous <Continuous>  dextrose 50% Injectable 25 Gram(s) IV Push once  dextrose 50% Injectable 12.5 Gram(s) IV Push once  dextrose 50% Injectable 25 Gram(s) IV Push once  enoxaparin Injectable 80 milliGRAM(s) SubCutaneous two times a day  fentaNYL   Infusion. 0.5 MICROgram(s)/kG/Hr IV Continuous <Continuous>  glucagon  Injectable 1 milliGRAM(s) IntraMuscular once  heparin  Infusion Syringe 300 Unit(s)/Hr CRRT <Continuous>  insulin lispro (ADMELOG) corrective regimen sliding scale   SubCutaneous every 6 hours  methylPREDNISolone sodium succinate Injectable 40 milliGRAM(s) IV Push every 8 hours  multivitamin 1 Tablet(s) Oral daily  norepinephrine Infusion 0.05 MICROgram(s)/kG/Min IV Continuous <Continuous>  pantoprazole  Injectable 40 milliGRAM(s) IV Push daily  Phoxillum Filtration BK 4 / 2.5 5000 milliLiter(s) CRRT <Continuous>  Phoxillum Filtration BK 4 / 2.5 5000 milliLiter(s) CRRT <Continuous>  Phoxillum Filtration BK 4 / 2.5 5000 milliLiter(s) CRRT <Continuous>  propofol Infusion 10 MICROgram(s)/kG/Min IV Continuous <Continuous>  vasopressin Infusion 0.04 Unit(s)/Min IV Continuous <Continuous>                            12.8   14.54 )-----------( 89       ( 02 May 2021 04:42 )             39.8     05-02    138  |  100  |  49.0<H>  ----------------------------<  116<H>  4.9   |  25.0  |  2.30<H>    Ca    7.6<L>      02 May 2021 04:42  Phos  4.2     05-02  Mg     2.2     05-02    TPro  4.6<L>  /  Alb  1.3<L>  /  TBili  3.7<H>  /  DBili  x   /  AST  405<H>  /  ALT  1254<H>  /  AlkPhos  106  05-02    RECENT CULTURES:  04-30 @ 18:44 .Blood Blood-Peripheral     Growth in aerobic and anaerobic bottles: Gram Positive Cocci in Clusters  Gram Stain performed by:  [facility]  [address] [place] [id number]    04-30 @ 18:28 .Blood Blood-Peripheral Blood Culture PCR    Growth in aerobic and anaerobic bottles: Gram Positive Cocci in Clusters  Gram Stain and BCID performed by:  [address] [address]  ***Blood Panel PCR results on this specimen are available  approximately 3 hours after the Gram stain result.***  Gram stain, PCR, and/or culture results may not always  correspond due to difference in methodologies.  ************************************************************  This PCR assay was performed using [employer].  The following targets are tested for: Enterococcus,  vancomycin resistant enterococci, Listeria monocytogenes,  coagulase negative staphylococci, S. aureus,  methicillin resistant S. aureus, Streptococcus agalactiae  (Group B), S. pneumoniae, S. pyogenes (Group A),  Acinetobacter baumannii, Enterobacter cloacae, E. coli,  Klebsiella oxytoca, K. pneumoniae, Proteus sp.,  Serratia marcescens, Haemophilus influenzae,  Neisseria meningitidis, Pseudomonas aeruginosa, Candida  albicans, C. glabrata, C krusei, C parapsilosis,  C. tropicalis and the KPC resistance gene.    04-19 @ 18:14 .Blood Blood-Peripheral     No growth at 5 days.      WBC Count: 14.54 K/uL (05-02-21 @ 04:42)  WBC Count: 19.87 K/uL (05-01-21 @ 05:51)  WBC Count: 22.11 K/uL (04-30-21 @ 18:37)  WBC Count: 30.63 K/uL (04-30-21 @ 05:11)  WBC Count: 8.99 K/uL (04-28-21 @ 05:30)    Creatinine, Serum: 2.30 mg/dL (05-02-21 @ 04:42)  Creatinine, Serum: 2.81 mg/dL (05-01-21 @ 15:10)  Creatinine, Serum: 2.70 mg/dL (05-01-21 @ 10:38)  Creatinine, Serum: 2.47 mg/dL (05-01-21 @ 05:51)  Creatinine, Serum: 1.95 mg/dL (04-30-21 @ 18:37)  Creatinine, Serum: 1.55 mg/dL (04-30-21 @ 12:01)  Creatinine, Serum: 1.02 mg/dL (04-30-21 @ 05:11)  Creatinine, Serum: 0.52 mg/dL (04-28-21 @ 05:30)    C-Reactive Protein, Serum: >422 mg/L (05-02-21 @ 04:42)  C-Reactive Protein, Serum: 368 mg/L (05-01-21 @ 05:50)  C-Reactive Protein, Serum: 123 mg/L (04-30-21 @ 05:11)  C-Reactive Protein, Serum: 15 mg/L (04-29-21 @ 07:50)  C-Reactive Protein, Serum: 16 mg/L (04-28-21 @ 05:30)  C-Reactive Protein, Serum: 27 mg/L (04-27-21 @ 05:07)  C-Reactive Protein, Serum: 40 mg/L (04-26-21 @ 04:46)  C-Reactive Protein, Serum: 66 mg/L (04-25-21 @ 04:53)  C-Reactive Protein, Serum: 154 mg/L (04-24-21 @ 04:43)  C-Reactive Protein, Serum: 134 mg/L (04-23-21 @ 06:56)  C-Reactive Protein, Serum: 128 mg/L (04-22-21 @ 07:09)  C-Reactive Protein, Serum: 174 mg/L (04-21-21 @ 11:00)  C-Reactive Protein, Serum: 211 mg/L (04-20-21 @ 05:41)    Ferritin, Serum: 66916 ng/mL (05-02-21 @ 04:42)  Ferritin, Serum: 02252 ng/mL (05-01-21 @ 05:50)  Ferritin, Serum: 4325 ng/mL (04-30-21 @ 05:11)  Ferritin, Serum: 1506 ng/mL (04-29-21 @ 07:50)  Ferritin, Serum: 1254 ng/mL (04-28-21 @ 05:30)  Ferritin, Serum: 1203 ng/mL (04-27-21 @ 05:07)  Ferritin, Serum: 1177 ng/mL (04-26-21 @ 04:46)  Ferritin, Serum: 1326 ng/mL (04-25-21 @ 04:53)  Ferritin, Serum: 2343 ng/mL (04-24-21 @ 04:43)  Ferritin, Serum: 1279 ng/mL (04-23-21 @ 06:56)  Ferritin, Serum: 1254 ng/mL (04-22-21 @ 07:09)  Ferritin, Serum: 1328 ng/mL (04-21-21 @ 11:00)  Ferritin, Serum: 1075 ng/mL (04-20-21 @ 05:41)  Ferritin, Serum: 829 ng/mL (04-16-21 @ 18:00)    Procalcitonin, Serum: 0.12 ng/mL (04-27-21 @ 05:06)  Procalcitonin, Serum: 0.67 ng/mL (04-19-21 @ 18:07)     Rapid RVP Result: NotDetec (04-16-21 @ 18:27)  SARS-CoV-2: Detected (04-16-21 @ 18:27)        < from: CT Angio Chest w/ IV Cont (04.21.21 @ 18:28) >  EXAM:  CT ANGIO CHEST (W)AW IC                          PROCEDURE DATE:  04/21/2021      INTERPRETATION:  CLINICAL INFORMATION: COVID-19 positive with deep vein thrombosis    COMPARISON: Same day chest x-ray, chest CT 3/29/2019    CONTRAST/COMPLICATIONS:  IV Contrast: Omnipaque 350  59 cc administered   41 cc discarded  Oral Contrast: NONE  Complications: None reported at time of study completion    PROCEDURE:  CT Angiography of the Chest.  Sagittal and coronal reformats were performed as well as 3D (MIP) reconstructions.    FINDINGS:    PULMONARY ARTERIES: No pulmonary embolism to the segmental branches. Limited visualization of subsegmental branches secondary to respiratory motion.    LUNGS AND LARGE AIRWAYS: The central airways are patent. Bilateral groundglass opacities compatible with COVID-19 pneumonia. Elevated left hemidiaphragm with left basilar atelectasis.  PLEURA: No pleural abnormality.  VESSELS: Normal caliber aorta.  HEART: Normal heart size. No pericardial effusion.  MEDIASTINUM AND MACHELLE: No adenopathy.  CHEST WALL AND LOWER NECK: No masses.  VISUALIZED UPPER ABDOMEN: Limited visualization is unremarkable.  BONES: No acute bony abnormality.    IMPRESSION:  *  No pulmonary embolism to the segmental branches. Limited visualization of subsegmental branches secondary to respiratory motion.  *  Bilateral groundglass opacities compatible with COVID-19 pneumonia. Elevated left hemidiaphragm with left basilar atelectasis.    < end of copied text >

## 2021-05-02 NOTE — PROVIDER CONTACT NOTE (OTHER) - RECOMMENDATIONS
come assess patient immediately  chest x-ray
stitch chest tube hole/site for reinforcement
1 amp dextrose and d10 maintenance gtt  reassess

## 2021-05-02 NOTE — PROCEDURE NOTE - NSINDICATIONS_GEN_A_CORE
dialysis/CRRT
pneumothorax
critical patient/respiratory distress
critical illness/emergency venous access/venous access
arterial puncture to obtain ABG's/blood sampling/cannulation purposes/critical patient/monitoring purposes
pneumothorax
pneumothorax

## 2021-05-02 NOTE — PROCEDURE NOTE - NSPOSTPRCRAD_GEN_A_CORE
pending
chest tube in correct position
central line located in the/central line located in the superior vena cava/depth of insertion/line adjusted to depth of insertion/post-procedure radiography performed
CXR pending

## 2021-05-02 NOTE — PROGRESS NOTE ADULT - ASSESSMENT
64 yo male admitted on 4/16 with COVID19 viral pneumonia, now with acute respiratory failure requiring intubation 4/30, s/p cardiac arrest w/ ROSC in 15 min, s/p  left ptx  pigtail 2/2 to pneum followed by large bore chest tube placed with large air leak.  5/1 Thoracostomy tube appeared kinked on cxr, with no air leak. + air leak from pigtail, Tube readjusted by MICU team and now immediate improvement in hemodynamics and oxygenation seen. Left formal tube with +continuous air leak, and Left pigtail w/o AL, CTS consulted to follow Chest tubes X2.  5/2 Remains sedated, orally intubated on ventilator, prone position

## 2021-05-02 NOTE — PROCEDURE NOTE - NSPOSTCAREGUIDE_GEN_A_CORE
Verbal/written post procedure instructions were given to patient/caregiver/Instructed patient/caregiver to follow-up with primary care physician/Instructed patient/caregiver regarding signs and symptoms of infection/Keep the cast/splint/dressing clean and dry/Care for catheter as per unit/ICU protocols
Verbal/written post procedure instructions were given to patient/caregiver/Instructed patient/caregiver to follow-up with primary care physician/Instructed patient/caregiver regarding signs and symptoms of infection/Keep the cast/splint/dressing clean and dry/Care for catheter as per unit/ICU protocols
Care for catheter as per unit/ICU protocols
Care for catheter as per unit/ICU protocols

## 2021-05-02 NOTE — PROCEDURE NOTE - NSPROCNAME_GEN_A_CORE
Central Line Insertion
Central Line Insertion
Chest Tube
Chest Tube
Arterial Puncture/Cannulation
Tracheal Intubation
Chest Tube

## 2021-05-02 NOTE — PROVIDER CONTACT NOTE (OTHER) - ASSESSMENT
left chest tube site leaking air at site.  more xeroform applied and dressing reinforced  PA contacted and at bedside
subcutaneous air noted on left posterior back/shoulder region  air leak present. patient still saturating high 80s
increasing pressor requirements  glucose 41

## 2021-05-02 NOTE — PROGRESS NOTE ADULT - SUBJECTIVE AND OBJECTIVE BOX
BAILEE COPELAND [id number]    Follow up: COVID 19     Remains on ventilator,     Had cardiac arrest and intubated, requiring Chest tube placement 4/30    S/P  Actemra 4/20    Allergies:  No Known Allergies    REVIEW OF SYSTEMS:  unable to obtain due to medical condition     Physical Exam:  GEN: sedated  HEENT: normocephalic and atraumatic.  Anicteric   NECK: Supple.   LUNGS: diffuse rhonchi   HEART: Regular rate and rhythm   ABDOMEN: Soft, nontender, and nondistended.  Positive bowel sounds.    : Hernandez   EXTREMITIES: trace edema.  MSK: no joint swelling  NEUROLOGIC: Sedated   PSYCHIATRIC: sedated   SKIN: No Rash    Vitals:    T(F): 96.4 (02 May 2021 02:00), Max: 99 (01 May 2021 18:00)  HR: 54 (02 May 2021 08:00)  RR: 34 (02 May 2021 08:00)  SpO2: 81% (02 May 2021 08:00) (81% - 99%)  temp max in last 48H T(F): , Max: 103.2 (04-30-21 @ 16:02)    Current Antibiotics:  meropenem  IVPB 1000 milliGRAM(s) IV Intermittent every 8 hours    Other medications:  aspirin enteric coated 81 milliGRAM(s) Oral daily  calcium gluconate IVPB 1 Gram(s) IV Intermittent every 6 hours  chlorhexidine 0.12% Liquid 15 milliLiter(s) Oral Mucosa every 12 hours  chlorhexidine 4% Liquid 1 Application(s) Topical <User Schedule>  cholecalciferol 2000 Unit(s) Oral daily  cisatracurium Infusion 3 MICROgram(s)/kG/Min IV Continuous <Continuous>  CRRT Treatment    <Continuous>  enoxaparin Injectable 80 milliGRAM(s) SubCutaneous two times a day  fentaNYL   Infusion. 0.5 MICROgram(s)/kG/Hr IV Continuous <Continuous>  glucagon  Injectable 1 milliGRAM(s) IntraMuscular once  heparin  Infusion Syringe 300 Unit(s)/Hr CRRT <Continuous>  insulin lispro (ADMELOG) corrective regimen sliding scale   SubCutaneous every 6 hours  methylPREDNISolone sodium succinate Injectable 40 milliGRAM(s) IV Push every 8 hours  multivitamin 1 Tablet(s) Oral daily  norepinephrine Infusion 0.05 MICROgram(s)/kG/Min IV Continuous <Continuous>  pantoprazole  Injectable 40 milliGRAM(s) IV Push daily  Phoxillum Filtration BK 4 / 2.5 5000 milliLiter(s) CRRT <Continuous>  Phoxillum Filtration BK 4 / 2.5 5000 milliLiter(s) CRRT <Continuous>  Phoxillum Filtration BK 4 / 2.5 5000 milliLiter(s) CRRT <Continuous>  propofol Infusion 10 MICROgram(s)/kG/Min IV Continuous <Continuous>  vasopressin Infusion 0.04 Unit(s)/Min IV Continuous <Continuous>                        12.8   14.54 )-----------( 89       ( 02 May 2021 04:42 )             39.8     05-02    138  |  100  |  49.0<H>  ----------------------------<  116<H>  4.9   |  25.0  |  2.30<H>    Ca    7.6<L>      02 May 2021 04:42  Phos  4.2     05-02  Mg     2.2     05-02    TPro  4.6<L>  /  Alb  1.3<L>  /  TBili  3.7<H>  /  DBili  x   /  AST  405<H>  /  ALT  1254<H>  /  AlkPhos  106  05-02    RECENT CULTURES:  04-30 @ 18:44 .Blood Blood-Peripheral     Growth in aerobic and anaerobic bottles: Gram Positive Cocci in Clusters  Gram Stain performed by:  [facility]  [address] [place] [address]    04-30 @ 18:28 .Blood Blood-Peripheral Blood Culture PCR    Growth in aerobic and anaerobic bottles: Gram Positive Cocci in Clusters  Gram Stain and BCID performed by:  [facility]  [address] [place] [address]  ***Blood Panel PCR results on this specimen are available  approximately 3 hours after the Gram stain result.***  Gram stain, PCR, and/or culture results may not always  correspond due to difference in methodologies.  ************************************************************  This PCR assay was performed using [employer].  The following targets are tested for: Enterococcus,  vancomycin resistant enterococci, Listeria monocytogenes,  coagulase negative staphylococci, S. aureus,  methicillin resistant S. aureus, Streptococcus agalactiae  (Group B), S. pneumoniae, S. pyogenes (Group A),  Acinetobacter baumannii, Enterobacter cloacae, E. coli,  Klebsiella oxytoca, K. pneumoniae, Proteus sp.,  Serratia marcescens, Haemophilus influenzae,  Neisseria meningitidis, Pseudomonas aeruginosa, Candida  albicans, C. glabrata, C krusei, C parapsilosis,  C. tropicalis and the KPC resistance gene.    04-19 @ 18:14 .Blood Blood-Peripheral     No growth at 5 days.      WBC Count: 14.54 K/uL (05-02-21 @ 04:42)  WBC Count: 19.87 K/uL (05-01-21 @ 05:51)  WBC Count: 22.11 K/uL (04-30-21 @ 18:37)  WBC Count: 30.63 K/uL (04-30-21 @ 05:11)  WBC Count: 8.99 K/uL (04-28-21 @ 05:30)    Creatinine, Serum: 2.30 mg/dL (05-02-21 @ 04:42)  Creatinine, Serum: 2.81 mg/dL (05-01-21 @ 15:10)  Creatinine, Serum: 2.70 mg/dL (05-01-21 @ 10:38)  Creatinine, Serum: 2.47 mg/dL (05-01-21 @ 05:51)  Creatinine, Serum: 1.95 mg/dL (04-30-21 @ 18:37)  Creatinine, Serum: 1.55 mg/dL (04-30-21 @ 12:01)  Creatinine, Serum: 1.02 mg/dL (04-30-21 @ 05:11)  Creatinine, Serum: 0.52 mg/dL (04-28-21 @ 05:30)    C-Reactive Protein, Serum: >422 mg/L (05-02-21 @ 04:42)  C-Reactive Protein, Serum: 368 mg/L (05-01-21 @ 05:50)  C-Reactive Protein, Serum: 123 mg/L (04-30-21 @ 05:11)  C-Reactive Protein, Serum: 15 mg/L (04-29-21 @ 07:50)  C-Reactive Protein, Serum: 16 mg/L (04-28-21 @ 05:30)  C-Reactive Protein, Serum: 27 mg/L (04-27-21 @ 05:07)  C-Reactive Protein, Serum: 40 mg/L (04-26-21 @ 04:46)  C-Reactive Protein, Serum: 66 mg/L (04-25-21 @ 04:53)  C-Reactive Protein, Serum: 154 mg/L (04-24-21 @ 04:43)  C-Reactive Protein, Serum: 134 mg/L (04-23-21 @ 06:56)  C-Reactive Protein, Serum: 128 mg/L (04-22-21 @ 07:09)  C-Reactive Protein, Serum: 174 mg/L (04-21-21 @ 11:00)  C-Reactive Protein, Serum: 211 mg/L (04-20-21 @ 05:41)    Ferritin, Serum: 50034 ng/mL (05-02-21 @ 04:42)  Ferritin, Serum: 18251 ng/mL (05-01-21 @ 05:50)  Ferritin, Serum: 4325 ng/mL (04-30-21 @ 05:11)  Ferritin, Serum: 1506 ng/mL (04-29-21 @ 07:50)  Ferritin, Serum: 1254 ng/mL (04-28-21 @ 05:30)  Ferritin, Serum: 1203 ng/mL (04-27-21 @ 05:07)  Ferritin, Serum: 1177 ng/mL (04-26-21 @ 04:46)  Ferritin, Serum: 1326 ng/mL (04-25-21 @ 04:53)  Ferritin, Serum: 2343 ng/mL (04-24-21 @ 04:43)  Ferritin, Serum: 1279 ng/mL (04-23-21 @ 06:56)  Ferritin, Serum: 1254 ng/mL (04-22-21 @ 07:09)  Ferritin, Serum: 1328 ng/mL (04-21-21 @ 11:00)  Ferritin, Serum: 1075 ng/mL (04-20-21 @ 05:41)  Ferritin, Serum: 829 ng/mL (04-16-21 @ 18:00)    Procalcitonin, Serum: 0.12 ng/mL (04-27-21 @ 05:06)  Procalcitonin, Serum: 0.67 ng/mL (04-19-21 @ 18:07)     Rapid RVP Result: NotDetec (04-16-21 @ 18:27)  SARS-CoV-2: Detected (04-16-21 @ 18:27)        < from: CT Angio Chest w/ IV Cont (04.21.21 @ 18:28) >  EXAM:  CT ANGIO CHEST (W)AW IC                          PROCEDURE DATE:  04/21/2021      INTERPRETATION:  CLINICAL INFORMATION: COVID-19 positive with deep vein thrombosis    COMPARISON: Same day chest x-ray, chest CT 3/29/2019    CONTRAST/COMPLICATIONS:  IV Contrast: Omnipaque 350  59 cc administered   41 cc discarded  Oral Contrast: NONE  Complications: None reported at time of study completion    PROCEDURE:  CT Angiography of the Chest.  Sagittal and coronal reformats were performed as well as 3D (MIP) reconstructions.    FINDINGS:    PULMONARY ARTERIES: No pulmonary embolism to the segmental branches. Limited visualization of subsegmental branches secondary to respiratory motion.    LUNGS AND LARGE AIRWAYS: The central airways are patent. Bilateral groundglass opacities compatible with COVID-19 pneumonia. Elevated left hemidiaphragm with left basilar atelectasis.  PLEURA: No pleural abnormality.  VESSELS: Normal caliber aorta.  HEART: Normal heart size. No pericardial effusion.  MEDIASTINUM AND MACHELLE: No adenopathy.  CHEST WALL AND LOWER NECK: No masses.  VISUALIZED UPPER ABDOMEN: Limited visualization is unremarkable.  BONES: No acute bony abnormality.    IMPRESSION:  *  No pulmonary embolism to the segmental branches. Limited visualization of subsegmental branches secondary to respiratory motion.  *  Bilateral ground glass opacities compatible with COVID-19 pneumonia. Elevated left hemidiaphragm with left basilar atelectasis.      65y  Male with no PMH comes to the ER with worsening shortness of breath. Patient states that has been having fevers, chills and headache for the last 8-10 days. Both of his sons who are in school recently were quarantining from school due to covid. He states the last few days he's been feeling especially weak and more short of breath. His PCP tried to get him to come in for antibody infusion, but when arrived found to be hypoxic to 60's and ill appearing and was brought to ER. In the ER patient was initially on 100% NRB and followed by 100% HFNC. Started on Remdesivir and Dexamethasone. Now on BIPAP.    Cardiac arrest   Acute Hypoxic respiratory failure  Left Pneumothorax    COVID-19 infection  B/L Pneumonia   ATN    - Repeat blood cultures Staph aureus  - On Meropenem  - On  Vancomycin    - Monitor for Vancomycin toxicity   - COVID 19 PCR positive   - Blood cultures no growth   - CT Chest reporting PNA    - Continue supportive care measures  - continue to trend inflammatory markers.   - trend CBC with diff, CMP,  CRP, Ferritin,  procalcitonin q 48hours  - May consider vitamin C, thiamine and zinc ,  - Completed 10 days of Remdesivir   - s/p Actemra 4/20  - Dexamethasone 6mg Daily   - follow up all outstanding cultures  - Trend Fever  - Trend Leukocytosis    On CVVHDF,     Will follow       D/W MICU team

## 2021-05-02 NOTE — PROCEDURE NOTE - PROCEDURE DATE TIME, MLM
30-Apr-2021 03:27
30-Apr-2021 03:29
30-Apr-2021 06:05
30-Apr-2021 06:46
30-Apr-2021 06:43
02-May-2021 19:15
01-May-2021 20:55

## 2021-05-02 NOTE — PROVIDER CONTACT NOTE (OTHER) - BACKGROUND
covid-19
covid-19  proned  2 chest tubes  pressors/paralytic  cvvhdf
covid-19   2 chest tubes  prone/pressors/paralytics

## 2021-05-02 NOTE — PROGRESS NOTE ADULT - ASSESSMENT
65y  Male with no PMH comes to the ER with worsening shortness of breath. Patient states that has been having fevers, chills and headache for the last 8-10 days. Both of his sons who are in school recently were quarantining from school due to covid. He states the last few days he's been feeling especially weak and more short of breath. His PCP tried to get him to come in for antibody infusion, but when arrived found to be hypoxic to 60's and ill appearing and was brought to ER. In the ER patient was initially on 100% NRB and followed by 100% HFNC. Started on Remdesivir and Dexamethasone. Now on BIPAP.      Cardiac arrest   Acute Hypoxic respiratory failure  Left Pneumothorax    COVID-19 infection  B/L Pneumonia   cough  fevers  shortness of breath  oliguria       - Repeat blood cultures Staph aureus  - Continue Meropenem  - Continue Vancomycin  - Monitor trough  - Monitor for Vancomycin toxicity   - Will require Vancomycin for Staph aureus pending sensitivities   - COVID 19 PCR positive   - Blood cultures no growth   - CT Chest reporting PNA    - Continue supportive care measures  - continue to trend inflammatory markers.   - trend CBC with diff, CMP,  CRP, Ferritin,  procalcitonin q 48hours  - May consider vitamin C, thiamine and zinc (note lack of evidence to support benefit with COVID 19)  - Discussed Remdesivir with the patient.   - Completed 10 days of Remdesivir   - s/p Actemra 4/20  - Dexamethasone 6mg Daily   - follow up all outstanding cultures  - Trend Fever  - Trend Leukocytosis      Will follow       D/W MICU team

## 2021-05-02 NOTE — PROVIDER CONTACT NOTE (OTHER) - SITUATION
subcutaneous air noted on left posterior back/shoulder region  air leak present.   dressing reinforced and held until PA at bedside to assess
glucose 41
left chest tube site leaking air at site.  more xeroform applied and dressing reinforced

## 2021-05-02 NOTE — PROCEDURE NOTE - NSCOMPLICATION_GEN_A_CORE
no complications
pneumothorax
no complications

## 2021-05-02 NOTE — PROGRESS NOTE ADULT - SUBJECTIVE AND OBJECTIVE BOX
Subjective: sedated, intubated on ventilator      V/S  T(C): 33.6 (05-02-21 @ 10:00), Max: 37.2 (05-01-21 @ 18:00)  HR: 76 (05-02-21 @ 13:01) (52 - 98)  ABP: 88/55 (05-02-21 @ 13:01) (88/55 - 148/80)  ABP(mean): 64 (05-02-21 @ 13:01) (64 - 105)  RR: 24 (05-02-21 @ 13:01) (24 - 35)  SpO2: 92% (05-02-21 @ 13:01) (81% - 98%)      CHEST TUBE:   Left x 2      OUTPUT:   81 / 0   L pigtail        29 / 0  L chest tube          per 24 hours    AIR LEAKS:  [x ] YES [ ] NO      MEDICATIONS  (STANDING):  aspirin enteric coated 81 milliGRAM(s) Oral daily  calcium gluconate IVPB 1 Gram(s) IV Intermittent every 6 hours  chlorhexidine 0.12% Liquid 15 milliLiter(s) Oral Mucosa every 12 hours  chlorhexidine 4% Liquid 1 Application(s) Topical <User Schedule>  cholecalciferol 2000 Unit(s) Oral daily  cisatracurium Infusion 3 MICROgram(s)/kG/Min (14.8 mL/Hr) IV Continuous <Continuous>  CRRT Treatment    <Continuous>  dextrose 40% Gel 15 Gram(s) Oral once  dextrose 5%. 1000 milliLiter(s) (50 mL/Hr) IV Continuous <Continuous>  dextrose 5%. 1000 milliLiter(s) (100 mL/Hr) IV Continuous <Continuous>  dextrose 50% Injectable 25 Gram(s) IV Push once  dextrose 50% Injectable 12.5 Gram(s) IV Push once  dextrose 50% Injectable 25 Gram(s) IV Push once  enoxaparin Injectable 80 milliGRAM(s) SubCutaneous two times a day  fentaNYL   Infusion. 0.5 MICROgram(s)/kG/Hr (4.11 mL/Hr) IV Continuous <Continuous>  glucagon  Injectable 1 milliGRAM(s) IntraMuscular once  heparin  Infusion Syringe 300 Unit(s)/Hr (0.6 mL/Hr) CRRT <Continuous>  insulin lispro (ADMELOG) corrective regimen sliding scale   SubCutaneous every 6 hours  meropenem  IVPB 1000 milliGRAM(s) IV Intermittent every 8 hours  methylPREDNISolone sodium succinate Injectable 40 milliGRAM(s) IV Push every 8 hours  multivitamin 1 Tablet(s) Oral daily  norepinephrine Infusion 0.05 MICROgram(s)/kG/Min (7.7 mL/Hr) IV Continuous <Continuous>  pantoprazole  Injectable 40 milliGRAM(s) IV Push daily  Phoxillum Filtration BK 4 / 2.5 5000 milliLiter(s) (500 mL/Hr) CRRT <Continuous>  Phoxillum Filtration BK 4 / 2.5 5000 milliLiter(s) (1500 mL/Hr) CRRT <Continuous>  Phoxillum Filtration BK 4 / 2.5 5000 milliLiter(s) (1000 mL/Hr) CRRT <Continuous>  propofol Infusion 10 MICROgram(s)/kG/Min (4.93 mL/Hr) IV Continuous <Continuous>  vancomycin  IVPB 1000 milliGRAM(s) IV Intermittent <User Schedule>  vasopressin Infusion 0.04 Unit(s)/Min (2.4 mL/Hr) IV Continuous <Continuous>      05-02    138  |  100  |  49.0<H>  ----------------------------<  116<H>  4.9   |  25.0  |  2.30<H>    Ca    7.6<L>      02 May 2021 04:42  Phos  4.2     05-02  Mg     2.2     05-02    TPro  4.6<L>  /  Alb  1.3<L>  /  TBili  3.7<H>  /  DBili  x   /  AST  405<H>  /  ALT  1254<H>  /  AlkPhos  106  05-02                               12.8   14.54 )-----------( 89       ( 02 May 2021 04:42 )             39.8                 CAPILLARY BLOOD GLUCOSE      POCT Blood Glucose.: 118 mg/dL (02 May 2021 12:21)  POCT Blood Glucose.: 97 mg/dL (02 May 2021 00:28)  POCT Blood Glucose.: 187 mg/dL (01 May 2021 17:52)           CXR:      Physical Exam:    Gen: prone positioned, orally intubated to ventilator    Lungs: b/l breath sounds, L formal chest tube and L pigtail in place, + air leak CT    CV: S1  S2  RRR    GI: unable to assess d/t prone position    Ext: Gen anasarca    Neuro: paralyzed            PAST MEDICAL & SURGICAL HISTORY:  No pertinent past medical history    S/P shoulder surgery             Subjective: sedated, intubated on ventilator      V/S  T(C): 33.6 (05-02-21 @ 10:00), Max: 37.2 (05-01-21 @ 18:00)  HR: 76 (05-02-21 @ 13:01) (52 - 98)  ABP: 88/55 (05-02-21 @ 13:01) (88/55 - 148/80)  ABP(mean): 64 (05-02-21 @ 13:01) (64 - 105)  RR: 24 (05-02-21 @ 13:01) (24 - 35)  SpO2: 92% (05-02-21 @ 13:01) (81% - 98%)    Mode: AC/ CMV (Assist Control/ Continuous Mandatory Ventilation)  RR (machine): 34  TV (machine): 390  FiO2: 90  PEEP: 5  MAP: 14  PIP: 30    CHEST TUBE:   Left x 2      OUTPUT:   81 / 0   L pigtail        29 / 0  L chest tube          per 24 hours    AIR LEAKS:  [x ] YES [ ] NO      MEDICATIONS  (STANDING):  aspirin enteric coated 81 milliGRAM(s) Oral daily  calcium gluconate IVPB 1 Gram(s) IV Intermittent every 6 hours  chlorhexidine 0.12% Liquid 15 milliLiter(s) Oral Mucosa every 12 hours  chlorhexidine 4% Liquid 1 Application(s) Topical <User Schedule>  cholecalciferol 2000 Unit(s) Oral daily  cisatracurium Infusion 3 MICROgram(s)/kG/Min (14.8 mL/Hr) IV Continuous <Continuous>  CRRT Treatment    <Continuous>  dextrose 40% Gel 15 Gram(s) Oral once  dextrose 5%. 1000 milliLiter(s) (50 mL/Hr) IV Continuous <Continuous>  dextrose 5%. 1000 milliLiter(s) (100 mL/Hr) IV Continuous <Continuous>  dextrose 50% Injectable 25 Gram(s) IV Push once  dextrose 50% Injectable 12.5 Gram(s) IV Push once  dextrose 50% Injectable 25 Gram(s) IV Push once  enoxaparin Injectable 80 milliGRAM(s) SubCutaneous two times a day  fentaNYL   Infusion. 0.5 MICROgram(s)/kG/Hr (4.11 mL/Hr) IV Continuous <Continuous>  glucagon  Injectable 1 milliGRAM(s) IntraMuscular once  heparin  Infusion Syringe 300 Unit(s)/Hr (0.6 mL/Hr) CRRT <Continuous>  insulin lispro (ADMELOG) corrective regimen sliding scale   SubCutaneous every 6 hours  meropenem  IVPB 1000 milliGRAM(s) IV Intermittent every 8 hours  methylPREDNISolone sodium succinate Injectable 40 milliGRAM(s) IV Push every 8 hours  multivitamin 1 Tablet(s) Oral daily  norepinephrine Infusion 0.05 MICROgram(s)/kG/Min (7.7 mL/Hr) IV Continuous <Continuous>  pantoprazole  Injectable 40 milliGRAM(s) IV Push daily  Phoxillum Filtration BK 4 / 2.5 5000 milliLiter(s) (500 mL/Hr) CRRT <Continuous>  Phoxillum Filtration BK 4 / 2.5 5000 milliLiter(s) (1500 mL/Hr) CRRT <Continuous>  Phoxillum Filtration BK 4 / 2.5 5000 milliLiter(s) (1000 mL/Hr) CRRT <Continuous>  propofol Infusion 10 MICROgram(s)/kG/Min (4.93 mL/Hr) IV Continuous <Continuous>  vancomycin  IVPB 1000 milliGRAM(s) IV Intermittent <User Schedule>  vasopressin Infusion 0.04 Unit(s)/Min (2.4 mL/Hr) IV Continuous <Continuous>      05-02    138  |  100  |  49.0<H>  ----------------------------<  116<H>  4.9   |  25.0  |  2.30<H>    Ca    7.6<L>      02 May 2021 04:42  Phos  4.2     05-02  Mg     2.2     05-02    TPro  4.6<L>  /  Alb  1.3<L>  /  TBili  3.7<H>  /  DBili  x   /  AST  405<H>  /  ALT  1254<H>  /  AlkPhos  106  05-02                               12.8   14.54 )-----------( 89       ( 02 May 2021 04:42 )             39.8                 CAPILLARY BLOOD GLUCOSE      POCT Blood Glucose.: 118 mg/dL (02 May 2021 12:21)  POCT Blood Glucose.: 97 mg/dL (02 May 2021 00:28)  POCT Blood Glucose.: 187 mg/dL (01 May 2021 17:52)           CXR:      Physical Exam:    Gen: prone positioned, orally intubated to ventilator    Lungs: b/l breath sounds, L formal chest tube and L pigtail in place, + air leak CT    CV: S1  S2  RRR    GI: unable to assess d/t prone position    Ext: Gen anasarca    Neuro: paralyzed            PAST MEDICAL & SURGICAL HISTORY:  No pertinent past medical history    S/P shoulder surgery

## 2021-05-02 NOTE — PROGRESS NOTE ADULT - SUBJECTIVE AND OBJECTIVE BOX
Patient is a 65y old  Male who presents with a chief complaint of covid19, hypoxia (02 May 2021 14:12)    BRIEF HOSPITAL COURSE: ***    Events last 24 hours: ***    PAST MEDICAL & SURGICAL HISTORY:  No pertinent past medical history    S/P shoulder surgery    Review of Systems:  CONSTITUTIONAL: No fever, chills, or fatigue  EYES: No eye pain, visual disturbances, or discharge  ENMT:  No difficulty hearing, tinnitus, vertigo; No sinus or throat pain  NECK: No pain or stiffness  RESPIRATORY: No cough, wheezing, chills or hemoptysis; No shortness of breath  CARDIOVASCULAR: No chest pain, palpitations, dizziness, or leg swelling  GASTROINTESTINAL: No abdominal or epigastric pain. No nausea, vomiting, or hematemesis; No diarrhea or constipation. No melena or hematochezia.  GENITOURINARY: No dysuria, frequency, hematuria, or incontinence  NEUROLOGICAL: No headaches, memory loss, loss of strength, numbness, or tremors  SKIN: No itching, burning, rashes, or lesions   MUSCULOSKELETAL: No joint pain or swelling; No muscle, back, or extremity pain  PSYCHIATRIC: No depression, anxiety, mood swings, or difficulty sleeping        Physical Examination:    ICU Vital Signs Last 24 Hrs  T(C): 33.6 (02 May 2021 10:00), Max: 37.2 (01 May 2021 18:00)  T(F): 92.4 (02 May 2021 10:00), Max: 99 (01 May 2021 18:00)  HR: 76 (02 May 2021 13:01) (52 - 98)  BP: --  BP(mean): --  ABP: 88/55 (02 May 2021 13:01) (88/55 - 148/80)  ABP(mean): 64 (02 May 2021 13:01) (64 - 105)  RR: 24 (02 May 2021 13:01) (24 - 35)  SpO2: 92% (02 May 2021 13:01) (81% - 98%)      General: No acute distress.    Neuro: AAO*3, No motor, sensory, or cranial nerve deficit  HEENT: Pupils equal, reactive to light, Moist oral mucosa  PULM: Clear to auscultation bilaterally, no significant adventitious breath sounds   CVS: Regular rhythm and controlled rate, no murmurs, rubs, or gallops  ABD: Soft, nondistended, nontender, normoactive bowel sounds  EXT: No b/l LE edema, nontender with pedal pulse palpable   SKIN: Warm and well perfused, no acute rashes       Medications:  meropenem  IVPB 1000 milliGRAM(s) IV Intermittent every 8 hours  vancomycin  IVPB 1000 milliGRAM(s) IV Intermittent <User Schedule>    norepinephrine Infusion 0.05 MICROgram(s)/kG/Min IV Continuous <Continuous>    ALBUTerol    90 MICROgram(s) HFA Inhaler 2 Puff(s) Inhalation every 6 hours PRN    cisatracurium Infusion 3 MICROgram(s)/kG/Min IV Continuous <Continuous>  fentaNYL   Infusion. 0.5 MICROgram(s)/kG/Hr IV Continuous <Continuous>  melatonin 5 milliGRAM(s) Oral at bedtime PRN  propofol Infusion 10 MICROgram(s)/kG/Min IV Continuous <Continuous>      aspirin enteric coated 81 milliGRAM(s) Oral daily  enoxaparin Injectable 80 milliGRAM(s) SubCutaneous two times a day  heparin  Infusion Syringe 300 Unit(s)/Hr CRRT <Continuous>    pantoprazole  Injectable 40 milliGRAM(s) IV Push daily      dextrose 40% Gel 15 Gram(s) Oral once  dextrose 50% Injectable 25 Gram(s) IV Push once  dextrose 50% Injectable 12.5 Gram(s) IV Push once  dextrose 50% Injectable 25 Gram(s) IV Push once  glucagon  Injectable 1 milliGRAM(s) IntraMuscular once  insulin lispro (ADMELOG) corrective regimen sliding scale   SubCutaneous every 6 hours  methylPREDNISolone sodium succinate Injectable 40 milliGRAM(s) IV Push every 8 hours  vasopressin Infusion 0.04 Unit(s)/Min IV Continuous <Continuous>    calcium gluconate IVPB 1 Gram(s) IV Intermittent every 6 hours  cholecalciferol 2000 Unit(s) Oral daily  dextrose 5%. 1000 milliLiter(s) IV Continuous <Continuous>  dextrose 5%. 1000 milliLiter(s) IV Continuous <Continuous>  multivitamin 1 Tablet(s) Oral daily  sodium chloride 0.9% lock flush 10 milliLiter(s) IV Push every 1 hour PRN      chlorhexidine 0.12% Liquid 15 milliLiter(s) Oral Mucosa every 12 hours  chlorhexidine 4% Liquid 1 Application(s) Topical <User Schedule>    CRRT Treatment    <Continuous>  Phoxillum Filtration BK 4 / 2.5 5000 milliLiter(s) CRRT <Continuous>  Phoxillum Filtration BK 4 / 2.5 5000 milliLiter(s) CRRT <Continuous>  Phoxillum Filtration BK 4 / 2.5 5000 milliLiter(s) CRRT <Continuous>      Mode: AC/ CMV (Assist Control/ Continuous Mandatory Ventilation)  RR (machine): 34  TV (machine): 390  FiO2: 90  PEEP: 5  MAP: 14  PIP: 30      I&O's Detail    01 May 2021 07:01  -  02 May 2021 07:00  --------------------------------------------------------  IN:    Cisatracurium: 298 mL    FentaNYL: 246 mL    IV PiggyBack: 200 mL    IV PiggyBack: 485 mL    IV PiggyBack: 100 mL    Norepinephrine: 542 mL    Phenylephrine: 60 mL    Propofol: 248.4 mL    Sodium Bicarbonate: 1000 mL    Vasopressin: 48 mL  Total IN: 3227.4 mL    OUT:    Chest Tube (mL): 81 mL    Chest Tube (mL): 29 mL    Indwelling Catheter - Urethral (mL): 100 mL    Other (mL): 714 mL  Total OUT: 924 mL    Total NET: 2303.4 mL      02 May 2021 07:01  -  02 May 2021 15:11  --------------------------------------------------------  IN:    Cisatracurium: 102.4 mL    Enteral Tube Flush: 40 mL    FentaNYL: 85.2 mL    IV PiggyBack: 250 mL    IV PiggyBack: 50 mL    IV PiggyBack: 70 mL    IV PiggyBack: 35 mL    Norepinephrine: 124.2 mL    Propofol: 92.4 mL    Vasopressin: 16.8 mL  Total IN: 866 mL    OUT:    Chest Tube (mL): 0 mL    Chest Tube (mL): 0 mL    Indwelling Catheter - Urethral (mL): 0 mL    Other (mL): 767 mL  Total OUT: 767 mL    Total NET: 99 mL            RADIOLOGY/ Microbiology/ Labs: reviewed      Patient is a 65y old  Male who presents with a chief complaint of covid19, hypoxia (02 May 2021 14:12)    BRIEF HOSPITAL COURSE:   66 yo male admitted with COVID-19 viral pneumonia, with acute hypoxemic respiratory failure and severe ARDS, complicated by cardiac arrest 4/30, septic vs obstructive shock related to tension PTX with ATN, ischemic hepatitis, lactic acidosis    Events last 24 hours:   Remains dependant on ventilator. 2 pressor shock and sedated w/ fentanyl/ versed   Proned at 7 AM today     PAST MEDICAL & SURGICAL HISTORY:  No pertinent past medical history    S/P shoulder surgery    Review of Systems:  Unable to assess    Physical Examination:    ICU Vital Signs Last 24 Hrs  T(C): 33.6 (02 May 2021 10:00), Max: 37.2 (01 May 2021 18:00)  T(F): 92.4 (02 May 2021 10:00), Max: 99 (01 May 2021 18:00)  HR: 76 (02 May 2021 13:01) (52 - 98)  BP: --  BP(mean): --  ABP: 88/55 (02 May 2021 13:01) (88/55 - 148/80)  ABP(mean): 64 (02 May 2021 13:01) (64 - 105)  RR: 24 (02 May 2021 13:01) (24 - 35)  SpO2: 92% (02 May 2021 13:01) (81% - 98%)      Neuro: Paralyzed and could not assess  HEENT: Proned  PULM: Decreased to auscultation bilaterally, subcutaneous emphysema +   CVS: Regular rhythm and controlled rate  ABD: Soft, nondistended  EXT: 1+ b/l LE edema  SKIN: Warm and well perfused, no acute rashes       Medications:  meropenem  IVPB 1000 milliGRAM(s) IV Intermittent every 8 hours  vancomycin  IVPB 1000 milliGRAM(s) IV Intermittent <User Schedule>    norepinephrine Infusion 0.05 MICROgram(s)/kG/Min IV Continuous <Continuous>    ALBUTerol    90 MICROgram(s) HFA Inhaler 2 Puff(s) Inhalation every 6 hours PRN    cisatracurium Infusion 3 MICROgram(s)/kG/Min IV Continuous <Continuous>  fentaNYL   Infusion. 0.5 MICROgram(s)/kG/Hr IV Continuous <Continuous>  melatonin 5 milliGRAM(s) Oral at bedtime PRN  propofol Infusion 10 MICROgram(s)/kG/Min IV Continuous <Continuous>      aspirin enteric coated 81 milliGRAM(s) Oral daily  enoxaparin Injectable 80 milliGRAM(s) SubCutaneous two times a day  heparin  Infusion Syringe 300 Unit(s)/Hr CRRT <Continuous>    pantoprazole  Injectable 40 milliGRAM(s) IV Push daily      dextrose 40% Gel 15 Gram(s) Oral once  dextrose 50% Injectable 25 Gram(s) IV Push once  dextrose 50% Injectable 12.5 Gram(s) IV Push once  dextrose 50% Injectable 25 Gram(s) IV Push once  glucagon  Injectable 1 milliGRAM(s) IntraMuscular once  insulin lispro (ADMELOG) corrective regimen sliding scale   SubCutaneous every 6 hours  methylPREDNISolone sodium succinate Injectable 40 milliGRAM(s) IV Push every 8 hours  vasopressin Infusion 0.04 Unit(s)/Min IV Continuous <Continuous>    calcium gluconate IVPB 1 Gram(s) IV Intermittent every 6 hours  cholecalciferol 2000 Unit(s) Oral daily  dextrose 5%. 1000 milliLiter(s) IV Continuous <Continuous>  dextrose 5%. 1000 milliLiter(s) IV Continuous <Continuous>  multivitamin 1 Tablet(s) Oral daily  sodium chloride 0.9% lock flush 10 milliLiter(s) IV Push every 1 hour PRN      chlorhexidine 0.12% Liquid 15 milliLiter(s) Oral Mucosa every 12 hours  chlorhexidine 4% Liquid 1 Application(s) Topical <User Schedule>    CRRT Treatment    <Continuous>  Phoxillum Filtration BK 4 / 2.5 5000 milliLiter(s) CRRT <Continuous>  Phoxillum Filtration BK 4 / 2.5 5000 milliLiter(s) CRRT <Continuous>  Phoxillum Filtration BK 4 / 2.5 5000 milliLiter(s) CRRT <Continuous>      Mode: AC/ CMV (Assist Control/ Continuous Mandatory Ventilation)  RR (machine): 34  TV (machine): 390  FiO2: 90  PEEP: 5  MAP: 14  PIP: 30      I&O's Detail    01 May 2021 07:01  -  02 May 2021 07:00  --------------------------------------------------------  IN:    Cisatracurium: 298 mL    FentaNYL: 246 mL    IV PiggyBack: 200 mL    IV PiggyBack: 485 mL    IV PiggyBack: 100 mL    Norepinephrine: 542 mL    Phenylephrine: 60 mL    Propofol: 248.4 mL    Sodium Bicarbonate: 1000 mL    Vasopressin: 48 mL  Total IN: 3227.4 mL    OUT:    Chest Tube (mL): 81 mL    Chest Tube (mL): 29 mL    Indwelling Catheter - Urethral (mL): 100 mL    Other (mL): 714 mL  Total OUT: 924 mL    Total NET: 2303.4 mL      02 May 2021 07:01  -  02 May 2021 15:11  --------------------------------------------------------  IN:    Cisatracurium: 102.4 mL    Enteral Tube Flush: 40 mL    FentaNYL: 85.2 mL    IV PiggyBack: 250 mL    IV PiggyBack: 50 mL    IV PiggyBack: 70 mL    IV PiggyBack: 35 mL    Norepinephrine: 124.2 mL    Propofol: 92.4 mL    Vasopressin: 16.8 mL  Total IN: 866 mL    OUT:    Chest Tube (mL): 0 mL    Chest Tube (mL): 0 mL    Indwelling Catheter - Urethral (mL): 0 mL    Other (mL): 767 mL  Total OUT: 767 mL    Total NET: 99 mL            RADIOLOGY/ Microbiology/ Labs: reviewed

## 2021-05-02 NOTE — PROGRESS NOTE ADULT - SUBJECTIVE AND OBJECTIVE BOX
Pt's sister, Bushra was updated on pt's deteriorating and critical conditions.  She maintains he will be DNR but continues to want all other treatments to be done.  Her questions and concerns were addressed.

## 2021-05-02 NOTE — PROGRESS NOTE ADULT - ASSESSMENT
Acute hypoxic respiratory failure sec to COVID 19 +  Viral/multifocal PNA  Cardiac arrest   Left PTX   Distributive shock  SANNA on CRRT   Shock liver   MSSA bacteriemia     Low tidal volume ARDSnet ventilation strategy, high PEEP. Currently paralyzed and proned. Repeat ABGs/CXR  Sedated w/ fentanyl, versed and paralyzed w/ Nimbex.  Plan to supine in 16hrs   Titrate pressors to keep MAP > 65. Currently on levophed and vasopressin   L side PTC ( w/ no air leak) and L thoracostomy w/ air leak. CTS following. Surgical tube had to be pulled out a bit yesterday (kinked on CXR)   S/p 10 days Remdesivir and Actemra 04/20. Remains on IV steroids. On Vanco and Merrem as per ID recs. Trend WBC and inflammatory markers  Plan to continue CRRT for a few days and readdress need. Avoid nephrotoxic agents. Strict I&O with Cr monitoring   Diet: Hold tube feeds for now. IV hydration. Maintain net negative fluid balance   Aggressive glycemic control, maintain BG < 180mg/dl  DVT ppx w/ Lovenox. Transfuse PRN to keep Hb>7.0  Contact/airborne isolation  Code status: DNR

## 2021-05-02 NOTE — PROCEDURE NOTE - NSINFORMCONSENT_GEN_A_CORE
This was an emergent procedure.
Benefits, risks, and possible complications of procedure explained to patient/caregiver who verbalized understanding and gave written consent.

## 2021-05-03 NOTE — CONSULT NOTE ADULT - ASSESSMENT
Patient called transplant coordinator with concerns related to her mycophenolate. Patient reports she is currently hospitalized at [facility] in [place]. Patient reports that they have been reducing/holding her mycophenolate because of infection concerns. Patient has been telling the MDs there to call the transplant team, but as far as she knows they have not contacted anyone. RN thanked patient for calling about this as our team would certainly want to be involved. Need to be sure that her transplant is being monitored appropriately.    Inpatient bedside RN provided with contact information for transplant team MDs. Asked that MD that has been taking care of patient in the hospital to contact transplant MDs. Inpatient bedside RN verbalizes understanding.    65M here with COVID PNA, ARDS, multiple organ failure, kidney failure has been on CVVHD, with grave prognosis.     #1 Ventilator dependence - due to COVID. grave prognosis.   #2 Kidney failure - grave prognosis. further treatment will not be beneficial   #3 Shock - grave prognosis.   #4 Encounter for palliative care - see GOC above.

## 2021-05-03 NOTE — CONSULT NOTE ADULT - CONSULT REQUESTED DATE/TIME
03-May-2021 12:52
19-Apr-2021 09:01
19-Apr-2021 15:24
01-May-2021 09:30
21-Apr-2021 17:10
01-May-2021 14:51

## 2021-05-03 NOTE — PROGRESS NOTE ADULT - REASON FOR ADMISSION
covid19, hypoxia
COVID-19 PNA
covid19, hypoxia

## 2021-05-03 NOTE — CONSULT NOTE ADULT - REASON FOR ADMISSION
covid19, hypoxia

## 2021-05-03 NOTE — CONSULT NOTE ADULT - SUBJECTIVE AND OBJECTIVE BOX
HPI: 65M with PMH as listed admitted  with COVID PNA, ARDS, course complicated by cardiac arrest , shock, multiple organ failure, with grave prognosis.     PERTINENT PMH REVIEWED: Yes     PAST MEDICAL & SURGICAL HISTORY:  No pertinent past medical history  S/P shoulder surgery    SOCIAL HISTORY:  unable to obtain right now, patient is intubated.                                    Admitted from:  home     Surrogate - Bushra sister     FAMILY HISTORY:  No pertinent family history in first degree relatives    Baseline ADLs (prior to admission):  Independent     Allergies    No Known Allergies    Present Symptoms:     Dyspnea: 0   Nausea/Vomiting: No  Anxiety:  Yes No  Depression: Yes No  Fatigue: Yes No  Loss of appetite: Yes No    Pain:             Character-            Duration-            Effect-            Factors-            Frequency-            Location-            Severity-    Review of Systems: Reviewed                     Negative:                     Positive:  Unable to obtain due to poor mentation   All others negative    MEDICATIONS  (STANDING):  aspirin  chewable 81 milliGRAM(s) Oral daily  calcium gluconate IVPB 1 Gram(s) IV Intermittent every 6 hours  chlorhexidine 0.12% Liquid 15 milliLiter(s) Oral Mucosa every 12 hours  chlorhexidine 4% Liquid 1 Application(s) Topical <User Schedule>  cholecalciferol 2000 Unit(s) Oral daily  cisatracurium Infusion 3 MICROgram(s)/kG/Min (14.8 mL/Hr) IV Continuous <Continuous>  CRRT Treatment    <Continuous>  dextrose 10%. 1000 milliLiter(s) (50 mL/Hr) IV Continuous <Continuous>  dextrose 40% Gel 15 Gram(s) Oral once  dextrose 5%. 1000 milliLiter(s) (50 mL/Hr) IV Continuous <Continuous>  dextrose 5%. 1000 milliLiter(s) (100 mL/Hr) IV Continuous <Continuous>  dextrose 50% Injectable 25 Gram(s) IV Push once  dextrose 50% Injectable 12.5 Gram(s) IV Push once  dextrose 50% Injectable 25 Gram(s) IV Push once  enoxaparin Injectable 80 milliGRAM(s) SubCutaneous two times a day  fentaNYL   Infusion. 0.5 MICROgram(s)/kG/Hr (4.11 mL/Hr) IV Continuous <Continuous>  glucagon  Injectable 1 milliGRAM(s) IntraMuscular once  insulin lispro (ADMELOG) corrective regimen sliding scale   SubCutaneous every 6 hours  meropenem  IVPB 1000 milliGRAM(s) IV Intermittent every 8 hours  methylPREDNISolone sodium succinate Injectable 40 milliGRAM(s) IV Push every 8 hours  multivitamin 1 Tablet(s) Oral daily  norepinephrine Infusion 0.05 MICROgram(s)/kG/Min (7.7 mL/Hr) IV Continuous <Continuous>  pantoprazole  Injectable 40 milliGRAM(s) IV Push daily  Phoxillum Filtration BK 4 / 2.5 5000 milliLiter(s) (1000 mL/Hr) CRRT <Continuous>  Phoxillum Filtration BK 4 / 2.5 5000 milliLiter(s) (1500 mL/Hr) CRRT <Continuous>  Phoxillum Filtration BK 4 / 2.5 5000 milliLiter(s) (2500 mL/Hr) CRRT <Continuous>  propofol Infusion 10 MICROgram(s)/kG/Min (4.93 mL/Hr) IV Continuous <Continuous>  sodium bicarbonate  Infusion 0.281 mEq/kG/Hr (150 mL/Hr) IV Continuous <Continuous>  vancomycin  IVPB 1000 milliGRAM(s) IV Intermittent <User Schedule>  vasopressin Infusion 0.04 Unit(s)/Min (2.4 mL/Hr) IV Continuous <Continuous>    MEDICATIONS  (PRN):  ALBUTerol    90 MICROgram(s) HFA Inhaler 2 Puff(s) Inhalation every 6 hours PRN Shortness of Breath and/or Wheezing  melatonin 5 milliGRAM(s) Oral at bedtime PRN Insomnia  sodium chloride 0.9% lock flush 10 milliLiter(s) IV Push every 1 hour PRN Pre/post blood products, medications, blood draw, and to maintain line patency    PHYSICAL EXAM:    Vital Signs Last 24 Hrs  T(C): 35.7 (03 May 2021 09:00), Max: 36.2 (03 May 2021 00:13)  T(F): 96.2 (03 May 2021 09:00), Max: 97.2 (03 May 2021 00:13)  HR: 70 (03 May 2021 11:00) (70 - 98)  BP: --  BP(mean): --  RR: 40 (03 May 2021 12:03) (23 - 45)  SpO2: 96% (03 May 2021 10:33) (84% - 96%)    General: alert  oriented x ____ lethargic agitated                  cachexia  nonverbal  coma    Karnofsky:  %    HEENT: normal  dry mouth  ET tube/trach    Lungs: comfortable tachypnea/labored breathing  excessive secretions    CV: normal  tachycardia    GI: normal  distended  tender  no BS               PEG/NG/OG tube  constipation  last BM:     : normal  incontinent  oliguria/anuria  cazares    MSK: normal  weakness  edema             ambulatory  bedbound/wheelchair bound    Skin: normal  pressure ulcers- Stage_____  no rash    LABS:                      13.3   17.05 )-----------( 93       ( 03 May 2021 04:00 )             46.4     05-03    135  |  98  |  20.0  ----------------------------<  98  6.6<HH>   |  15.0<L>  |  0.92    Ca    7.6<L>      03 May 2021 04:00  Phos  8.4     05-03  Mg     2.7     05-03    TPro  4.5<L>  /  Alb  1.3<L>  /  TBili  6.2<H>  /  DBili  x   /  AST  >7000<H>  /  ALT  >6450<H>  /  AlkPhos  385<H>  05-    Urinalysis Basic - ( 01 May 2021 18:24 )    Color: Isabel / Appearance: Clear / S.020 / pH: x  Gluc: x / Ketone: Trace  / Bili: Small / Urobili: 4   Blood: x / Protein: 100 / Nitrite: Negative   Leuk Esterase: Trace / RBC: 11-25 /HPF / WBC 6-10   Sq Epi: x / Non Sq Epi: x / Bacteria: Occasional    I&O's Summary    02 May 2021 07:  -  03 May 2021 07:00  --------------------------------------------------------  IN: 5173.2 mL / OUT: 5047 mL / NET: 126.2 mL    03 May 2021 07:  -  03 May 2021 12:52  --------------------------------------------------------  IN: 2008.4 mL / OUT: 2320 mL / NET: -311.6 mL        RADIOLOGY & ADDITIONAL STUDIES:    ADVANCE DIRECTIVES:   DNR YES NO  Completed on:                     MOLST  YES NO   Completed on:  Living Will  YES NO   Completed on:       HPI: 65M with PMH as listed admitted  with COVID PNA, ARDS, course complicated by cardiac arrest , shock, multiple organ failure, with grave prognosis.     PERTINENT PMH REVIEWED: Yes     PAST MEDICAL & SURGICAL HISTORY:  No pertinent past medical history  S/P shoulder surgery    SOCIAL HISTORY:  unable to obtain right now, patient is intubated.                                    Admitted from:  home     Surrogate - Bushra sister     FAMILY HISTORY:  No pertinent family history in first degree relatives    Baseline ADLs (prior to admission):  Independent     Allergies    No Known Allergies    Present Symptoms:     Dyspnea: 0   Nausea/Vomiting: No  Anxiety:  No  Depression: No  Fatigue: Yes   Loss of appetite: unable     Pain: unable             Character-            Duration-            Effect-            Factors-            Frequency-            Location-            Severity-    Review of Systems: Reviewed               Unable to obtain due to poor mentation   All others negative    MEDICATIONS  (STANDING):  aspirin  chewable 81 milliGRAM(s) Oral daily  calcium gluconate IVPB 1 Gram(s) IV Intermittent every 6 hours  chlorhexidine 0.12% Liquid 15 milliLiter(s) Oral Mucosa every 12 hours  chlorhexidine 4% Liquid 1 Application(s) Topical <User Schedule>  cholecalciferol 2000 Unit(s) Oral daily  cisatracurium Infusion 3 MICROgram(s)/kG/Min (14.8 mL/Hr) IV Continuous <Continuous>  CRRT Treatment    <Continuous>  dextrose 10%. 1000 milliLiter(s) (50 mL/Hr) IV Continuous <Continuous>  dextrose 40% Gel 15 Gram(s) Oral once  dextrose 5%. 1000 milliLiter(s) (50 mL/Hr) IV Continuous <Continuous>  dextrose 5%. 1000 milliLiter(s) (100 mL/Hr) IV Continuous <Continuous>  dextrose 50% Injectable 25 Gram(s) IV Push once  dextrose 50% Injectable 12.5 Gram(s) IV Push once  dextrose 50% Injectable 25 Gram(s) IV Push once  enoxaparin Injectable 80 milliGRAM(s) SubCutaneous two times a day  fentaNYL   Infusion. 0.5 MICROgram(s)/kG/Hr (4.11 mL/Hr) IV Continuous <Continuous>  glucagon  Injectable 1 milliGRAM(s) IntraMuscular once  insulin lispro (ADMELOG) corrective regimen sliding scale   SubCutaneous every 6 hours  meropenem  IVPB 1000 milliGRAM(s) IV Intermittent every 8 hours  methylPREDNISolone sodium succinate Injectable 40 milliGRAM(s) IV Push every 8 hours  multivitamin 1 Tablet(s) Oral daily  norepinephrine Infusion 0.05 MICROgram(s)/kG/Min (7.7 mL/Hr) IV Continuous <Continuous>  pantoprazole  Injectable 40 milliGRAM(s) IV Push daily  Phoxillum Filtration BK 4 / 2.5 5000 milliLiter(s) (1000 mL/Hr) CRRT <Continuous>  Phoxillum Filtration BK 4 / 2.5 5000 milliLiter(s) (1500 mL/Hr) CRRT <Continuous>  Phoxillum Filtration BK 4 / 2.5 5000 milliLiter(s) (2500 mL/Hr) CRRT <Continuous>  propofol Infusion 10 MICROgram(s)/kG/Min (4.93 mL/Hr) IV Continuous <Continuous>  sodium bicarbonate  Infusion 0.281 mEq/kG/Hr (150 mL/Hr) IV Continuous <Continuous>  vancomycin  IVPB 1000 milliGRAM(s) IV Intermittent <User Schedule>  vasopressin Infusion 0.04 Unit(s)/Min (2.4 mL/Hr) IV Continuous <Continuous>    MEDICATIONS  (PRN):  ALBUTerol    90 MICROgram(s) HFA Inhaler 2 Puff(s) Inhalation every 6 hours PRN Shortness of Breath and/or Wheezing  melatonin 5 milliGRAM(s) Oral at bedtime PRN Insomnia  sodium chloride 0.9% lock flush 10 milliLiter(s) IV Push every 1 hour PRN Pre/post blood products, medications, blood draw, and to maintain line patency    PHYSICAL EXAM:    Vital Signs Last 24 Hrs  T(C): 35.7 (03 May 2021 09:00), Max: 36.2 (03 May 2021 00:13)  T(F): 96.2 (03 May 2021 09:00), Max: 97.2 (03 May 2021 00:13)  HR: 70 (03 May 2021 11:00) (70 - 98)  BP: --  BP(mean): --  RR: 40 (03 May 2021 12:03) (23 - 45)  SpO2: 96% (03 May 2021 10:33) (84% - 96%)    General: vented     Karnofsky:  10-20 %    HEENT: ET tube    Lungs: comfortable    CV: normal      GI: normal     : cazares    MSK: edema     Skin: no rash    LABS:                      13.3   17.05 )-----------( 93       ( 03 May 2021 04:00 )             46.4     05-03    135  |  98  |  20.0  ----------------------------<  98  6.6<HH>   |  15.0<L>  |  0.92    Ca    7.6<L>      03 May 2021 04:00  Phos  8.4     05-03  Mg     2.7     05-03    TPro  4.5<L>  /  Alb  1.3<L>  /  TBili  6.2<H>  /  DBili  x   /  AST  >7000<H>  /  ALT  >6450<H>  /  AlkPhos  385<H>  05-03    Urinalysis Basic - ( 01 May 2021 18:24 )    Color: Isabel / Appearance: Clear / S.020 / pH: x  Gluc: x / Ketone: Trace  / Bili: Small / Urobili: 4   Blood: x / Protein: 100 / Nitrite: Negative   Leuk Esterase: Trace / RBC: 11-25 /HPF / WBC 6-10   Sq Epi: x / Non Sq Epi: x / Bacteria: Occasional    I&O's Summary    02 May 2021 07:  -  03 May 2021 07:00  --------------------------------------------------------  IN: 5173.2 mL / OUT: 5047 mL / NET: 126.2 mL    03 May 2021 07:  -  03 May 2021 12:52  --------------------------------------------------------  IN: 2008.4 mL / OUT: 2320 mL / NET: -311.6 mL    RADIOLOGY & ADDITIONAL STUDIES:    ADVANCE DIRECTIVES: DNR

## 2021-05-03 NOTE — PROGRESS NOTE ADULT - SUBJECTIVE AND OBJECTIVE BOX
[facility] DIVISION OF KIDNEY DISEASES AND HYPERTENSION -- FOLLOW UP NOTE  --------------------------------------------------------------------------------  Chief Complaint: ATN    24 hour events/subjective:  Pt on CoVID isolation  Doing poorly despite high clearance CVVHDF      PAST HISTORY  --------------------------------------------------------------------------------  No significant changes to PMH, PSH, FHx, SHx, unless otherwise noted    ALLERGIES & MEDICATIONS  --------------------------------------------------------------------------------  Allergies    No Known Allergies    Intolerances      Standing Inpatient Medications  aspirin enteric coated 81 milliGRAM(s) Oral daily  calcium gluconate IVPB 1 Gram(s) IV Intermittent every 6 hours  chlorhexidine 0.12% Liquid 15 milliLiter(s) Oral Mucosa every 12 hours  chlorhexidine 4% Liquid 1 Application(s) Topical <User Schedule>  cholecalciferol 2000 Unit(s) Oral daily  cisatracurium Infusion 3 MICROgram(s)/kG/Min IV Continuous <Continuous>  CRRT Treatment    <Continuous>  dextrose 10%. 1000 milliLiter(s) IV Continuous <Continuous>  dextrose 40% Gel 15 Gram(s) Oral once  dextrose 5%. 1000 milliLiter(s) IV Continuous <Continuous>  dextrose 5%. 1000 milliLiter(s) IV Continuous <Continuous>  dextrose 50% Injectable 25 Gram(s) IV Push once  dextrose 50% Injectable 12.5 Gram(s) IV Push once  dextrose 50% Injectable 25 Gram(s) IV Push once  enoxaparin Injectable 80 milliGRAM(s) SubCutaneous two times a day  fentaNYL   Infusion. 0.5 MICROgram(s)/kG/Hr IV Continuous <Continuous>  glucagon  Injectable 1 milliGRAM(s) IntraMuscular once  insulin lispro (ADMELOG) corrective regimen sliding scale   SubCutaneous every 6 hours  meropenem  IVPB 1000 milliGRAM(s) IV Intermittent every 8 hours  methylPREDNISolone sodium succinate Injectable 40 milliGRAM(s) IV Push every 8 hours  multivitamin 1 Tablet(s) Oral daily  norepinephrine Infusion 0.05 MICROgram(s)/kG/Min IV Continuous <Continuous>  pantoprazole  Injectable 40 milliGRAM(s) IV Push daily  Phoxillum Filtration BK 4 / 2.5 5000 milliLiter(s) CRRT <Continuous>  Phoxillum Filtration BK 4 / 2.5 5000 milliLiter(s) CRRT <Continuous>  Phoxillum Filtration BK 4 / 2.5 5000 milliLiter(s) CRRT <Continuous>  propofol Infusion 10 MICROgram(s)/kG/Min IV Continuous <Continuous>  sodium bicarbonate  Infusion 0.281 mEq/kG/Hr IV Continuous <Continuous>  vancomycin  IVPB 1000 milliGRAM(s) IV Intermittent <User Schedule>  vasopressin Infusion 0.04 Unit(s)/Min IV Continuous <Continuous>    PRN Inpatient Medications  ALBUTerol    90 MICROgram(s) HFA Inhaler 2 Puff(s) Inhalation every 6 hours PRN  melatonin 5 milliGRAM(s) Oral at bedtime PRN  sodium chloride 0.9% lock flush 10 milliLiter(s) IV Push every 1 hour PRN      REVIEW OF SYSTEMS  --------------------------------------------------------------------------------  Unable to obtain on CoVID isolation    VITALS/PHYSICAL EXAM  --------------------------------------------------------------------------------  TONO): 35.7 (05-03-21 @ 09:00), Max: 36.2 (05-03-21 @ 00:13)  HR: 70 (05-03-21 @ 11:00) (70 - 98)  BP: --  RR: 40 (05-03-21 @ 12:03) (23 - 45)  SpO2: 96% (05-03-21 @ 10:33) (84% - 96%)  Wt(kg): --    Weight (kg): 85 (05-03-21 @ 06:41)      05-02-21 @ 07:01  -  05-03-21 @ 07:00  --------------------------------------------------------  IN: 5173.2 mL / OUT: 5047 mL / NET: 126.2 mL    05-03-21 @ 07:01  -  05-03-21 @ 12:06  --------------------------------------------------------  IN: 1713.6 mL / OUT: 1931 mL / NET: -217.4 mL      Physical Exam:  Unable to obtain on CoVID isolation    LABS/STUDIES  --------------------------------------------------------------------------------              13.3   17.05 >-----------<  93       [05-03-21 @ 04:00]              46.4     135  |  98  |  20.0  ----------------------------<  98      [05-03-21 @ 04:00]  6.6   |  15.0  |  0.92        Ca     7.6     [05-03-21 @ 04:00]      Mg     2.7     [05-03-21 @ 04:00]      Phos  8.4     [05-03-21 @ 04:00]    TPro  4.5  /  Alb  1.3  /  TBili  6.2  /  DBili  x   /  AST  >7000  /  ALT  >6450  /  AlkPhos  385  [05-03-21 @ 04:00]        LDH >2332      [05-03-21 @ 04:01]    Creatinine Trend:  SCr 0.92 [05-03 @ 04:00]  SCr 2.30 [05-02 @ 04:42]  SCr 2.81 [05-01 @ 15:10]  SCr 2.70 [05-01 @ 10:38]  SCr 2.47 [05-01 @ 05:51]    Urinalysis - [05-01-21 @ 18:24]      Color Isabel / Appearance Clear / SG 1.020 / pH 6.0      Gluc 250 / Ketone Trace  / Bili Small / Urobili 4       Blood Large / Protein 100 / Leuk Est Trace / Nitrite Negative      RBC 11-25 / WBC 6-10 / Hyaline  / Gran 3-5 / Sq Epi  / Non Sq Epi  / Bacteria Occasional    Urine Creatinine 46      [05-01-21 @ 18:23]  Urine Protein 177.0      [05-01-21 @ 18:23]  Urine Sodium 82      [05-01-21 @ 18:23]  Urine Osmolality 323      [05-01-21 @ 18:24]    Ferritin >[id number]      [05-03-21 @ 04:01]  Lipid: chol --, , HDL --, LDL --      [05-02-21 @ 04:42]    HCV 0.11, Nonreact      [04-17-21 @ 21:01]

## 2021-05-03 NOTE — PROGRESS NOTE ADULT - SUBJECTIVE AND OBJECTIVE BOX
[facility]  INFECTIOUS DISEASES AND INTERNAL MEDICINE at [place]  =======================================================  Jose G Avilez MD  Diplomates American Board of Internal Medicine and Infectious Diseases  Tel: [phone]      Fax: [phone]  =======================================================    BAILEE COPELAND [id number]    Follow up: COVID 19     Remains on vent     Had cardiac arrest and intubated, requiring Chest tube placement 4/30    s/p Actemra 4/20      Allergies:  No Known Allergies      REVIEW OF SYSTEMS:  unable to obtain due to medical condition       Physical Exam:  GEN: sedated  HEENT: normocephalic and atraumatic.  Anicteric   NECK: Supple.   LUNGS: diffuse rhonchi   HEART: Regular rate and rhythm   ABDOMEN: Soft, nontender, and nondistended.  Positive bowel sounds.    : Hernandez   EXTREMITIES: trace edema.  MSK: no joint swelling  NEUROLOGIC: Sedated   PSYCHIATRIC: sedated   SKIN: No Rash      Vitals:  T(F): 96.2 (03 May 2021 09:00), Max: 97.2 (03 May 2021 00:13)  HR: 59 (03 May 2021 13:03)  BP: --  RR: 40 (03 May 2021 13:03)  SpO2: 96% (03 May 2021 10:33) (84% - 96%)  temp max in last 48H T(F): , Max: 99 (05-01-21 @ 18:00)    Current Antibiotics:  meropenem  IVPB 1000 milliGRAM(s) IV Intermittent every 8 hours  vancomycin  IVPB 1000 milliGRAM(s) IV Intermittent <User Schedule>    Other medications:  aspirin  chewable 81 milliGRAM(s) Oral daily  calcium gluconate IVPB 1 Gram(s) IV Intermittent every 6 hours  chlorhexidine 0.12% Liquid 15 milliLiter(s) Oral Mucosa every 12 hours  chlorhexidine 4% Liquid 1 Application(s) Topical <User Schedule>  cholecalciferol 2000 Unit(s) Oral daily  cisatracurium Infusion 3 MICROgram(s)/kG/Min IV Continuous <Continuous>  CRRT Treatment    <Continuous>  dextrose 10%. 1000 milliLiter(s) IV Continuous <Continuous>  dextrose 40% Gel 15 Gram(s) Oral once  dextrose 5%. 1000 milliLiter(s) IV Continuous <Continuous>  dextrose 5%. 1000 milliLiter(s) IV Continuous <Continuous>  dextrose 50% Injectable 25 Gram(s) IV Push once  dextrose 50% Injectable 12.5 Gram(s) IV Push once  dextrose 50% Injectable 25 Gram(s) IV Push once  enoxaparin Injectable 80 milliGRAM(s) SubCutaneous two times a day  fentaNYL   Infusion. 0.5 MICROgram(s)/kG/Hr IV Continuous <Continuous>  glucagon  Injectable 1 milliGRAM(s) IntraMuscular once  insulin lispro (ADMELOG) corrective regimen sliding scale   SubCutaneous every 6 hours  methylPREDNISolone sodium succinate Injectable 40 milliGRAM(s) IV Push every 8 hours  multivitamin 1 Tablet(s) Oral daily  norepinephrine Infusion 0.05 MICROgram(s)/kG/Min IV Continuous <Continuous>  pantoprazole  Injectable 40 milliGRAM(s) IV Push daily  Phoxillum Filtration BK 4 / 2.5 5000 milliLiter(s) CRRT <Continuous>  Phoxillum Filtration BK 4 / 2.5 5000 milliLiter(s) CRRT <Continuous>  Phoxillum Filtration BK 4 / 2.5 5000 milliLiter(s) CRRT <Continuous>  propofol Infusion 10 MICROgram(s)/kG/Min IV Continuous <Continuous>  sodium bicarbonate  Infusion 0.281 mEq/kG/Hr IV Continuous <Continuous>  vasopressin Infusion 0.04 Unit(s)/Min IV Continuous <Continuous>                 13.3   17.05 )-----------( 93       ( 03 May 2021 04:00 )             46.4     05-03    135  |  98  |  20.0  ----------------------------<  98  6.6<HH>   |  15.0<L>  |  0.92    Ca    7.6<L>      03 May 2021 04:00  Phos  8.4     05-03  Mg     2.7     05-03    TPro  4.5<L>  /  Alb  1.3<L>  /  TBili  6.2<H>  /  DBili  x   /  AST  >7000<H>  /  ALT  >6450<H>  /  AlkPhos  385<H>  05-03    RECENT CULTURES:  04-30 @ 18:44 .Blood Blood-Peripheral     Growth in aerobic and anaerobic bottles: Staphylococcus aureus  See previous culture [id number]  Growth in aerobic and anaerobic bottles: Gram Positive Cocci in Clusters  Gram Stain performed by:  [facility]  [address] [place] [address]    04-30 @ 18:28 .Blood Blood-Peripheral Blood Culture PCR    Growth in aerobic and anaerobic bottles: Staphylococcus aureus  Growth in aerobic and anaerobic bottles: Gram Positive Cocci in Clusters  Gram Stain and BCID performed by:  [facility]  [address] [place] [address]  ***Blood Panel PCR results on this specimen are available  approximately 3 hours after the Gram stain result.***  Gram stain, PCR, and/or culture results may not always  correspond due to difference in methodologies.  ************************************************************  This PCR assay was performed using [employer].  The following targets are tested for: Enterococcus,  vancomycin resistant enterococci, Listeria monocytogenes,  coagulase negative staphylococci, S. aureus,  methicillin resistant S. aureus, Streptococcus agalactiae  (Group B), S. pneumoniae, S. pyogenes (Group A),  Acinetobacter baumannii, Enterobacter cloacae, E. coli,  Klebsiella oxytoca, K. pneumoniae, Proteus sp.,  Serratia marcescens, Haemophilus influenzae,  Neisseria meningitidis, Pseudomonas aeruginosa, Candida  albicans, C. glabrata, C krusei, C parapsilosis,  C. tropicalis and the KPC resistance gene.    04-19 @ 18:14 .Blood Blood-Peripheral     No growth at 5 days.      WBC Count: 17.05 K/uL (05-03-21 @ 04:00)  WBC Count: 14.54 K/uL (05-02-21 @ 04:42)  WBC Count: 19.87 K/uL (05-01-21 @ 05:51)  WBC Count: 22.11 K/uL (04-30-21 @ 18:37)  WBC Count: 30.63 K/uL (04-30-21 @ 05:11)    Creatinine, Serum: 0.92 mg/dL (05-03-21 @ 04:00)  Creatinine, Serum: 2.30 mg/dL (05-02-21 @ 04:42)  Creatinine, Serum: 2.81 mg/dL (05-01-21 @ 15:10)  Creatinine, Serum: 2.70 mg/dL (05-01-21 @ 10:38)  Creatinine, Serum: 2.47 mg/dL (05-01-21 @ 05:51)  Creatinine, Serum: 1.95 mg/dL (04-30-21 @ 18:37)  Creatinine, Serum: 1.55 mg/dL (04-30-21 @ 12:01)  Creatinine, Serum: 1.02 mg/dL (04-30-21 @ 05:11)    C-Reactive Protein, Serum: 294 mg/L (05-03-21 @ 04:01)  C-Reactive Protein, Serum: >422 mg/L (05-02-21 @ 04:42)  C-Reactive Protein, Serum: 368 mg/L (05-01-21 @ 05:50)  C-Reactive Protein, Serum: 123 mg/L (04-30-21 @ 05:11)  C-Reactive Protein, Serum: 15 mg/L (04-29-21 @ 07:50)  C-Reactive Protein, Serum: 16 mg/L (04-28-21 @ 05:30)  C-Reactive Protein, Serum: 27 mg/L (04-27-21 @ 05:07)  C-Reactive Protein, Serum: 40 mg/L (04-26-21 @ 04:46)  C-Reactive Protein, Serum: 66 mg/L (04-25-21 @ 04:53)  C-Reactive Protein, Serum: 154 mg/L (04-24-21 @ 04:43)  C-Reactive Protein, Serum: 134 mg/L (04-23-21 @ 06:56)  C-Reactive Protein, Serum: 128 mg/L (04-22-21 @ 07:09)  C-Reactive Protein, Serum: 174 mg/L (04-21-21 @ 11:00)  C-Reactive Protein, Serum: 211 mg/L (04-20-21 @ 05:41)    Ferritin, Serum: >917331 ng/mL (05-03-21 @ 04:01)  Ferritin, Serum: 63990 ng/mL (05-02-21 @ 04:42)  Ferritin, Serum: 04050 ng/mL (05-01-21 @ 05:50)  Ferritin, Serum: 4325 ng/mL (04-30-21 @ 05:11)  Ferritin, Serum: 1506 ng/mL (04-29-21 @ 07:50)  Ferritin, Serum: 1254 ng/mL (04-28-21 @ 05:30)  Ferritin, Serum: 1203 ng/mL (04-27-21 @ 05:07)  Ferritin, Serum: 1177 ng/mL (04-26-21 @ 04:46)  Ferritin, Serum: 1326 ng/mL (04-25-21 @ 04:53)  Ferritin, Serum: 2343 ng/mL (04-24-21 @ 04:43)  Ferritin, Serum: 1279 ng/mL (04-23-21 @ 06:56)  Ferritin, Serum: 1254 ng/mL (04-22-21 @ 07:09)  Ferritin, Serum: 1328 ng/mL (04-21-21 @ 11:00)  Ferritin, Serum: 1075 ng/mL (04-20-21 @ 05:41)  Ferritin, Serum: 829 ng/mL (04-16-21 @ 18:00)    Procalcitonin, Serum: 0.12 ng/mL (04-27-21 @ 05:06)  Procalcitonin, Serum: 0.67 ng/mL (04-19-21 @ 18:07)     Rapid RVP Result: NotDetec (04-16-21 @ 18:27)  SARS-CoV-2: Detected (04-16-21 @ 18:27)        < from: CT Angio Chest w/ IV Cont (04.21.21 @ 18:28) >  EXAM:  CT ANGIO CHEST (W)AW IC                          PROCEDURE DATE:  04/21/2021      INTERPRETATION:  CLINICAL INFORMATION: COVID-19 positive with deep vein thrombosis    COMPARISON: Same day chest x-ray, chest CT 3/29/2019    CONTRAST/COMPLICATIONS:  IV Contrast: Omnipaque 350  59 cc administered   41 cc discarded  Oral Contrast: NONE  Complications: None reported at time of study completion    PROCEDURE:  CT Angiography of the Chest.  Sagittal and coronal reformats were performed as well as 3D (MIP) reconstructions.    FINDINGS:    PULMONARY ARTERIES: No pulmonary embolism to the segmental branches. Limited visualization of subsegmental branches secondary to respiratory motion.    LUNGS AND LARGE AIRWAYS: The central airways are patent. Bilateral groundglass opacities compatible with COVID-19 pneumonia. Elevated left hemidiaphragm with left basilar atelectasis.  PLEURA: No pleural abnormality.  VESSELS: Normal caliber aorta.  HEART: Normal heart size. No pericardial effusion.  MEDIASTINUM AND MACHELLE: No adenopathy.  CHEST WALL AND LOWER NECK: No masses.  VISUALIZED UPPER ABDOMEN: Limited visualization is unremarkable.  BONES: No acute bony abnormality.    IMPRESSION:  *  No pulmonary embolism to the segmental branches. Limited visualization of subsegmental branches secondary to respiratory motion.  *  Bilateral groundglass opacities compatible with COVID-19 pneumonia. Elevated left hemidiaphragm with left basilar atelectasis.    < end of copied text >

## 2021-05-03 NOTE — CONSULT NOTE ADULT - TIME BILLING
coordination fo care plan with ICU team. bedside family meeting, review of chart. Universal Safety Interventions

## 2021-05-03 NOTE — PROGRESS NOTE ADULT - PROBLEM SELECTOR PROBLEM 2
Acute respiratory failure, unspecified whether with hypoxia or hypercapnia
Acute respiratory distress syndrome (ARDS) due to COVID-19 virus
Acute hypoxemic respiratory failure due to COVID-19
Pneumonia due to COVID-19 virus
Acute hypoxemic respiratory failure due to COVID-19
Acute respiratory failure, unspecified whether with hypoxia or hypercapnia

## 2021-05-03 NOTE — CONSULT NOTE ADULT - CONVERSATION DETAILS
met with sister Bushra at bedside with Dr. Woods. Other sister was on the phone. Overall they all understand that there is no further interventions that can be done to reverse his illness and that he is at end of life. No further escalation in therapy, they will wait until 3:15/3:30 until his kids ( 11 and 15) can have a chance to say their goodbyes and then will further discuss stopping all other heroics (HD, pressors).     20 minutes spent at bedside.

## 2021-05-03 NOTE — PROGRESS NOTE ADULT - CRITICAL CARE SERVICES PROVIDED
Critical care services provided

## 2021-05-03 NOTE — PROGRESS NOTE ADULT - NSICDXPILOT_GEN_ALL_CORE
[place]

## 2021-05-03 NOTE — PROGRESS NOTE ADULT - SUBJECTIVE AND OBJECTIVE BOX
Remains Hyperkalemic & Acidotic , despite High Clearances,    Hemolysis, Inflammatory markers are extremely high,    Dialysate soln., changed,    ICU Vital Signs Last 24 Hrs  T(C): 36.2 (03 May 2021 00:13), Max: 36.2 (03 May 2021 00:13)  T(F): 97.2 (03 May 2021 00:13), Max: 97.2 (03 May 2021 00:13)  HR: 81 (03 May 2021 04:07) (54 - 98)  ABP: 99/61 (03 May 2021 02:00) (83/53 - 117/53)  ABP(mean): 73 (03 May 2021 02:00) (59 - 80)  RR: 45 (03 May 2021 02:00) (23 - 45)  SpO2: 96% (03 May 2021 04:07) (81% - 96%)    Grave prognosis,    135    |  98     |  20.0   ----------------------------<  98     Ca:7.6<L> (03 May 2021 04:00)  6.6<HH>   |  15.0<L>  |  0.92       eGFR if Non : 86  eGFR if : 100    TPro  4.5<L>  /  Alb  1.3<L>  /  TBili  6.2<H>  /  DBili  x      /  AST  >7000<H>  /  ALT  >6450<H>  /  AlkPhos  385<H>  03 May 2021 04:00                                13.3   17.05<H> )-----------( 93<L>    ( 03 May 2021 04:00 )                    46.4     Ferritin:>384838 ng/mL<H>  ( @ 04:01)    Urinalysis Basic - ( 01 May 2021 18:24 )  Color: Isabel<!> / Appearance: Clear / S.020 / pH: x  Gluc: x / Ketone: Trace<!>  / Bili: Small<!> / Urobili: 4<!>   Blood: x / Protein: 100<!> / Nitrite: Negative   Leuk Esterase: Trace<!> / RBC: 11-25 /HPF<!> / WBC 6-10<!>   Sq Epi: x / Non Sq Epi: x / Bacteria: Occasional<!>    UOsm:323 mosm/kg  ( @ 18:24)    D/W SONU MUNIZ.

## 2021-05-03 NOTE — DISCHARGE NOTE FOR THE EXPIRED PATIENT - HOSPITAL COURSE
65M w/ no significant PMHx was admitted on 04/16 w/ worsening SOB and found to be in hypoxic respiratory failure 2/2 COVID-19 PNA. He received Actemra on 4/19, started on steroids, full dose anticoagulation, and alternating between BPAP and HFNC. His respiratory status worsened and he was subsequently intubated on 04/30. Post intubation was c/b left sided tension PTX and a PEA cardiac arrest with ROSC ~ 15min and emergent L sided decompression w/ PTC. He course was further c/b SANNA needing CRRT and recurrent PTX, worsening shock state and MOF. He was eventually made DNR and GOC discussion was ongoing to make him comfort care when he had a cardiac arrest. Sister at bedside when this happened. Pt was pronounced by cardiopulmonary criteria @ 16:02 on 05/03

## 2021-05-03 NOTE — PROGRESS NOTE ADULT - ASSESSMENT
66 yo male admitted on 4/16 with COVID19 viral pneumonia, now with acute respiratory failure requiring intubation 4/30, s/p cardiac arrest w/ ROSC in 15 min, s/p  left ptx  pigtail 2/2 to pneum followed by large bore chest tube placed with large air leak.  5/1 Thoracostomy tube appeared kinked on cxr, with no air leak. + air leak from pigtail, Tube readjusted by MICU team and now immediate improvement in hemodynamics and oxygenation seen. Left formal tube with +continuous air leak, and Left pigtail w/o AL, CTS consulted to follow Chest tubes X2.  5/2 Remains sedated, orally intubated on ventilator, prone position   5/3 Additional left CT placed last evening for acute PTX  Sedated.proned on ventilator

## 2021-05-03 NOTE — PROGRESS NOTE ADULT - SUBJECTIVE AND OBJECTIVE BOX
Subjective: Sedated ventilated Proned      V/S  T(C): 35.7 (05-03-21 @ 09:00), Max: 36.2 (05-03-21 @ 00:13)  HR: 62 (05-03-21 @ 13:36) (59 - 98)  ABP: 112/71 (05-03-21 @ 13:03) (83/53 - 133/73)  ABP(mean): 84 (05-03-21 @ 13:03) (59 - 95)  RR: 40 (05-03-21 @ 13:03) (23 - 45)  SpO2: 96% (05-03-21 @ 10:33) (84% - 96%)      CHEST TUBE:    2  chest tubes to left , 1 left pigtail as well                         AIR LEAKS:  [x ] YES [ ] NO      MEDICATIONS  (STANDING):  aspirin  chewable 81 milliGRAM(s) Oral daily  calcium gluconate IVPB 1 Gram(s) IV Intermittent every 6 hours  chlorhexidine 0.12% Liquid 15 milliLiter(s) Oral Mucosa every 12 hours  chlorhexidine 4% Liquid 1 Application(s) Topical <User Schedule>  cholecalciferol 2000 Unit(s) Oral daily  cisatracurium Infusion 3 MICROgram(s)/kG/Min (14.8 mL/Hr) IV Continuous <Continuous>  CRRT Treatment    <Continuous>  dextrose 10%. 1000 milliLiter(s) (50 mL/Hr) IV Continuous <Continuous>  dextrose 40% Gel 15 Gram(s) Oral once  dextrose 5%. 1000 milliLiter(s) (50 mL/Hr) IV Continuous <Continuous>  dextrose 5%. 1000 milliLiter(s) (100 mL/Hr) IV Continuous <Continuous>  dextrose 50% Injectable 25 Gram(s) IV Push once  dextrose 50% Injectable 12.5 Gram(s) IV Push once  dextrose 50% Injectable 25 Gram(s) IV Push once  enoxaparin Injectable 80 milliGRAM(s) SubCutaneous two times a day  fentaNYL   Infusion. 0.5 MICROgram(s)/kG/Hr (4.11 mL/Hr) IV Continuous <Continuous>  glucagon  Injectable 1 milliGRAM(s) IntraMuscular once  insulin lispro (ADMELOG) corrective regimen sliding scale   SubCutaneous every 6 hours  meropenem  IVPB 1000 milliGRAM(s) IV Intermittent every 8 hours  methylPREDNISolone sodium succinate Injectable 40 milliGRAM(s) IV Push every 8 hours  multivitamin 1 Tablet(s) Oral daily  norepinephrine Infusion 0.05 MICROgram(s)/kG/Min (7.7 mL/Hr) IV Continuous <Continuous>  pantoprazole  Injectable 40 milliGRAM(s) IV Push daily  Phoxillum Filtration BK 4 / 2.5 5000 milliLiter(s) (1000 mL/Hr) CRRT <Continuous>  Phoxillum Filtration BK 4 / 2.5 5000 milliLiter(s) (1500 mL/Hr) CRRT <Continuous>  Phoxillum Filtration BK 4 / 2.5 5000 milliLiter(s) (2500 mL/Hr) CRRT <Continuous>  propofol Infusion 10 MICROgram(s)/kG/Min (4.93 mL/Hr) IV Continuous <Continuous>  sodium bicarbonate  Infusion 0.281 mEq/kG/Hr (150 mL/Hr) IV Continuous <Continuous>  vancomycin  IVPB 1000 milliGRAM(s) IV Intermittent <User Schedule>  vasopressin Infusion 0.04 Unit(s)/Min (2.4 mL/Hr) IV Continuous <Continuous>      05-03    135  |  98  |  20.0  ----------------------------<  98  6.6<HH>   |  15.0<L>  |  0.92    Ca    7.6<L>      03 May 2021 04:00  Phos  8.4     05-03  Mg     2.7     05-03    TPro  4.5<L>  /  Alb  1.3<L>  /  TBili  6.2<H>  /  DBili  x   /  AST  >7000<H>  /  ALT  >6450<H>  /  AlkPhos  385<H>  05-03                               13.3   17.05 )-----------( 93       ( 03 May 2021 04:00 )             46.4                 CAPILLARY BLOOD GLUCOSE      POCT Blood Glucose.: 47 mg/dL (03 May 2021 12:13)  POCT Blood Glucose.: 31 mg/dL (03 May 2021 11:26)  POCT Blood Glucose.: 64 mg/dL (03 May 2021 07:01)  POCT Blood Glucose.: 87 mg/dL (03 May 2021 02:03)  POCT Blood Glucose.: 53 mg/dL (03 May 2021 01:55)  POCT Blood Glucose.: 50 mg/dL (03 May 2021 00:23)  POCT Blood Glucose.: 43 mg/dL (03 May 2021 00:19)  POCT Blood Glucose.: 150 mg/dL (02 May 2021 17:36)  POCT Blood Glucose.: 41 mg/dL (02 May 2021 17:16)  POCT Blood Glucose.: <30 mg/dL (02 May 2021 17:15)           CXR: < from: Xray Chest 1 View- PORTABLE-Urgent (Xray Chest 1 View- PORTABLE-Urgent .) (05.02.21 @ 19:32) >  Left chest tube placed with improvement in small, lateral pneumothorax. Left pigtail catheter remains in satisfactory position. Improvement in soft tissue emphysema at the left lateral chest    Other lines and tubes in satisfactory position.    Perihilar/peripheral patchy infiltrates, grossly unchanged.    < end of copied text >        Physical Exam:      Gen: prone positioned, orally intubated to ventilator    Lungs: b/l breath sounds,2  L formal chest tubes and L pigtail in place, + air leak CT    CV: S1  S2  RRR    GI: unable to assess d/t prone position    Ext: Gen anasarca    Neuro: paralyzed              PAST MEDICAL & SURGICAL HISTORY:  No pertinent past medical history    S/P shoulder surgery

## 2021-05-03 NOTE — PROGRESS NOTE ADULT - SUBJECTIVE AND OBJECTIVE BOX
Patient is a 65y old  Male who presents with a chief complaint of covid19, hypoxia (02 May 2021 14:12)    BRIEF HOSPITAL COURSE:   64 yo male admitted with COVID-19 viral pneumonia, with acute hypoxemic respiratory failure and severe ARDS, complicated by cardiac arrest 4/30, septic vs obstructive shock related to tension PTX with ATN, ischemic hepatitis, lactic acidosis    Events last 24 hours:   Inserted another surgical chest tube on L side after worsening subcutaneous on the left side/ extending to back  On CRRT and remains proned ( was proned at 7 AM on 05/02)   Remains fully dependant on ventilator, APRV mode and on 2 pressor shock and sedated w/ fentanyl/ versed   Chest tube #2 and PTC w/ air leak, no air leak     PAST MEDICAL & SURGICAL HISTORY:  No pertinent past medical history    S/P shoulder surgery    Review of Systems:  Unable to assess    Physical Examination:  ICU Vital Signs Last 24 Hrs  T(C): 35.7 (03 May 2021 09:00), Max: 36.2 (03 May 2021 00:13)  T(F): 96.2 (03 May 2021 09:00), Max: 97.2 (03 May 2021 00:13)  HR: 70 (03 May 2021 11:00) (70 - 98)  BP: --  BP(mean): --  ABP: 133/73 (03 May 2021 12:03) (83/53 - 133/73)  ABP(mean): 90 (03 May 2021 12:03) (59 - 95)  RR: 40 (03 May 2021 12:03) (23 - 45)  SpO2: 96% (03 May 2021 10:33) (84% - 96%)        Neuro: Paralyzed and could not assess  HEENT: Proned  PULM: Decreased to auscultation, subcutaneous emphysema +   CVS: Regular rhythm and controlled rate  ABD: Soft, nondistended  EXT: 1+ b/l LE edema  SKIN: Warm and well perfused, no acute rashes     MEDICATIONS  (STANDING):  aspirin  chewable 81 milliGRAM(s) Oral daily  calcium gluconate IVPB 1 Gram(s) IV Intermittent every 6 hours  chlorhexidine 0.12% Liquid 15 milliLiter(s) Oral Mucosa every 12 hours  chlorhexidine 4% Liquid 1 Application(s) Topical <User Schedule>  cholecalciferol 2000 Unit(s) Oral daily  cisatracurium Infusion 3 MICROgram(s)/kG/Min (14.8 mL/Hr) IV Continuous <Continuous>  CRRT Treatment    <Continuous>  dextrose 10%. 1000 milliLiter(s) (50 mL/Hr) IV Continuous <Continuous>  dextrose 40% Gel 15 Gram(s) Oral once  dextrose 5%. 1000 milliLiter(s) (50 mL/Hr) IV Continuous <Continuous>  dextrose 5%. 1000 milliLiter(s) (100 mL/Hr) IV Continuous <Continuous>  dextrose 50% Injectable 25 Gram(s) IV Push once  dextrose 50% Injectable 12.5 Gram(s) IV Push once  dextrose 50% Injectable 25 Gram(s) IV Push once  enoxaparin Injectable 80 milliGRAM(s) SubCutaneous two times a day  fentaNYL   Infusion. 0.5 MICROgram(s)/kG/Hr (4.11 mL/Hr) IV Continuous <Continuous>  glucagon  Injectable 1 milliGRAM(s) IntraMuscular once  insulin lispro (ADMELOG) corrective regimen sliding scale   SubCutaneous every 6 hours  meropenem  IVPB 1000 milliGRAM(s) IV Intermittent every 8 hours  methylPREDNISolone sodium succinate Injectable 40 milliGRAM(s) IV Push every 8 hours  multivitamin 1 Tablet(s) Oral daily  norepinephrine Infusion 0.05 MICROgram(s)/kG/Min (7.7 mL/Hr) IV Continuous <Continuous>  pantoprazole  Injectable 40 milliGRAM(s) IV Push daily  Phoxillum Filtration BK 4 / 2.5 5000 milliLiter(s) (1000 mL/Hr) CRRT <Continuous>  Phoxillum Filtration BK 4 / 2.5 5000 milliLiter(s) (1500 mL/Hr) CRRT <Continuous>  Phoxillum Filtration BK 4 / 2.5 5000 milliLiter(s) (2500 mL/Hr) CRRT <Continuous>  propofol Infusion 10 MICROgram(s)/kG/Min (4.93 mL/Hr) IV Continuous <Continuous>  sodium bicarbonate  Infusion 0.281 mEq/kG/Hr (150 mL/Hr) IV Continuous <Continuous>  vancomycin  IVPB 1000 milliGRAM(s) IV Intermittent <User Schedule>  vasopressin Infusion 0.04 Unit(s)/Min (2.4 mL/Hr) IV Continuous <Continuous>    MEDICATIONS  (PRN):  ALBUTerol    90 MICROgram(s) HFA Inhaler 2 Puff(s) Inhalation every 6 hours PRN Shortness of Breath and/or Wheezing  melatonin 5 milliGRAM(s) Oral at bedtime PRN Insomnia  sodium chloride 0.9% lock flush 10 milliLiter(s) IV Push every 1 hour PRN Pre/post blood products, medications, blood draw, and to maintain line patency        RADIOLOGY/ Microbiology/ Labs: reviewed

## 2021-05-03 NOTE — PROGRESS NOTE ADULT - PROBLEM SELECTOR PROBLEM 1
Acute respiratory distress syndrome (ARDS) due to COVID-19 virus
Acute respiratory distress syndrome (ARDS) due to COVID-19 virus
Pneumonia due to COVID-19 virus
Pneumothorax, left
Acute respiratory failure, unspecified whether with hypoxia or hypercapnia
Pneumothorax, left

## 2021-05-03 NOTE — PROGRESS NOTE ADULT - PROVIDER SPECIALTY LIST ADULT
Critical Care
Infectious Disease
Infectious Disease
Internal Medicine
Thoracic Surgery
Cardiology
Critical Care
Infectious Disease
MICU
Nephrology
Nephrology
Critical Care
Hospitalist
Infectious Disease
Infectious Disease
Internal Medicine
MICU
Nephrology
Critical Care
Infectious Disease
MICU
Critical Care
MICU
Nephrology
Thoracic Surgery
MICU
MICU

## 2021-05-03 NOTE — PROGRESS NOTE ADULT - ASSESSMENT
1) ATN  2) CoVID 19  3) Respiratory failure  4) Acidosis    Grave prognosis  on CVVHDF and vent  Inflammatory markers extremely high  discussed with MICU team

## 2021-05-04 LAB
FUNGITELL: <31 PG/ML — SIGNIFICANT CHANGE UP
GALACTOMANNAN AG SERPL-ACNC: <0.5 INDEX — SIGNIFICANT CHANGE UP
MYOGLOBIN UR-MCNC: 1831 NG/ML — HIGH (ref 0–13)

## 2021-05-14 NOTE — CHART NOTE - NSCHARTNOTEFT_GEN_A_CORE
Palliative care social work note.    Bereavement call made to patients sister Mireille Almaraz. Support and resources offered.

## 2021-06-03 PROCEDURE — 84156 ASSAY OF PROTEIN URINE: CPT

## 2021-06-03 PROCEDURE — 84478 ASSAY OF TRIGLYCERIDES: CPT

## 2021-06-03 PROCEDURE — 82330 ASSAY OF CALCIUM: CPT

## 2021-06-03 PROCEDURE — 82803 BLOOD GASES ANY COMBINATION: CPT

## 2021-06-03 PROCEDURE — 87305 ASPERGILLUS AG IA: CPT

## 2021-06-03 PROCEDURE — 83036 HEMOGLOBIN GLYCOSYLATED A1C: CPT

## 2021-06-03 PROCEDURE — 82947 ASSAY GLUCOSE BLOOD QUANT: CPT

## 2021-06-03 PROCEDURE — 82728 ASSAY OF FERRITIN: CPT

## 2021-06-03 PROCEDURE — 84100 ASSAY OF PHOSPHORUS: CPT

## 2021-06-03 PROCEDURE — 94002 VENT MGMT INPAT INIT DAY: CPT

## 2021-06-03 PROCEDURE — 85025 COMPLETE CBC W/AUTO DIFF WBC: CPT

## 2021-06-03 PROCEDURE — 85027 COMPLETE CBC AUTOMATED: CPT

## 2021-06-03 PROCEDURE — 81001 URINALYSIS AUTO W/SCOPE: CPT

## 2021-06-03 PROCEDURE — 84132 ASSAY OF SERUM POTASSIUM: CPT

## 2021-06-03 PROCEDURE — 80202 ASSAY OF VANCOMYCIN: CPT

## 2021-06-03 PROCEDURE — 87186 SC STD MICRODIL/AGAR DIL: CPT

## 2021-06-03 PROCEDURE — 83615 LACTATE (LD) (LDH) ENZYME: CPT

## 2021-06-03 PROCEDURE — 85379 FIBRIN DEGRADATION QUANT: CPT

## 2021-06-03 PROCEDURE — 84484 ASSAY OF TROPONIN QUANT: CPT

## 2021-06-03 PROCEDURE — 84145 PROCALCITONIN (PCT): CPT

## 2021-06-03 PROCEDURE — 87040 BLOOD CULTURE FOR BACTERIA: CPT

## 2021-06-03 PROCEDURE — 94003 VENT MGMT INPAT SUBQ DAY: CPT

## 2021-06-03 PROCEDURE — 83735 ASSAY OF MAGNESIUM: CPT

## 2021-06-03 PROCEDURE — 87631 RESP VIRUS 3-5 TARGETS: CPT

## 2021-06-03 PROCEDURE — U0003: CPT

## 2021-06-03 PROCEDURE — 80053 COMPREHEN METABOLIC PANEL: CPT

## 2021-06-03 PROCEDURE — 85384 FIBRINOGEN ACTIVITY: CPT

## 2021-06-03 PROCEDURE — 86803 HEPATITIS C AB TEST: CPT

## 2021-06-03 PROCEDURE — 85610 PROTHROMBIN TIME: CPT

## 2021-06-03 PROCEDURE — 87150 DNA/RNA AMPLIFIED PROBE: CPT

## 2021-06-03 PROCEDURE — 85730 THROMBOPLASTIN TIME PARTIAL: CPT

## 2021-06-03 PROCEDURE — 85014 HEMATOCRIT: CPT

## 2021-06-03 PROCEDURE — 93970 EXTREMITY STUDY: CPT

## 2021-06-03 PROCEDURE — 86140 C-REACTIVE PROTEIN: CPT

## 2021-06-03 PROCEDURE — 93005 ELECTROCARDIOGRAM TRACING: CPT

## 2021-06-03 PROCEDURE — 83880 ASSAY OF NATRIURETIC PEPTIDE: CPT

## 2021-06-03 PROCEDURE — 82435 ASSAY OF BLOOD CHLORIDE: CPT

## 2021-06-03 PROCEDURE — 80048 BASIC METABOLIC PNL TOTAL CA: CPT

## 2021-06-03 PROCEDURE — 85018 HEMOGLOBIN: CPT

## 2021-06-03 PROCEDURE — C8929: CPT

## 2021-06-03 PROCEDURE — 71275 CT ANGIOGRAPHY CHEST: CPT

## 2021-06-03 PROCEDURE — 84300 ASSAY OF URINE SODIUM: CPT

## 2021-06-03 PROCEDURE — 82570 ASSAY OF URINE CREATININE: CPT

## 2021-06-03 PROCEDURE — 83605 ASSAY OF LACTIC ACID: CPT

## 2021-06-03 PROCEDURE — 96375 TX/PRO/DX INJ NEW DRUG ADDON: CPT

## 2021-06-03 PROCEDURE — 82962 GLUCOSE BLOOD TEST: CPT

## 2021-06-03 PROCEDURE — 0225U NFCT DS DNA&RNA 21 SARSCOV2: CPT

## 2021-06-03 PROCEDURE — 36415 COLL VENOUS BLD VENIPUNCTURE: CPT

## 2021-06-03 PROCEDURE — 83935 ASSAY OF URINE OSMOLALITY: CPT

## 2021-06-03 PROCEDURE — 86769 SARS-COV-2 COVID-19 ANTIBODY: CPT

## 2021-06-03 PROCEDURE — 99285 EMERGENCY DEPT VISIT HI MDM: CPT | Mod: 25

## 2021-06-03 PROCEDURE — U0005: CPT

## 2021-06-03 PROCEDURE — 94660 CPAP INITIATION&MGMT: CPT

## 2021-06-03 PROCEDURE — 83874 ASSAY OF MYOGLOBIN: CPT

## 2021-06-03 PROCEDURE — 96374 THER/PROPH/DIAG INJ IV PUSH: CPT

## 2021-06-03 PROCEDURE — 87449 NOS EACH ORGANISM AG IA: CPT

## 2021-06-03 PROCEDURE — 71045 X-RAY EXAM CHEST 1 VIEW: CPT

## 2021-06-03 PROCEDURE — 84295 ASSAY OF SERUM SODIUM: CPT

## 2021-10-06 PROBLEM — Z28.21 REFUSED PNEUMOCOCCAL VACCINATION: Status: ACTIVE | Noted: 2020-03-16

## 2022-02-02 NOTE — DIETITIAN INITIAL EVALUATION ADULT. - HEIGHT FOR BMI (INCHES)
From: Francisco Gonzalez  To: Newton Cruz MD  Sent: 2/2/2022 10:09 AM EST  Subject: Atorvastatin 20 mg    Could you send a script to the [facility]  for 20mg and quantity of 90. Dr Blue gave me excellent marks for my colon cleansing.  Thanks   
8

## 2023-04-30 NOTE — DIETITIAN INITIAL EVALUATION ADULT. - PERTINENT LABORATORY DATA
None
04-20 Na137 mmol/L Glu 127 mg/dL<H> K+ 4.8 mmol/L Cr  0.85 mg/dL BUN 31.0 mg/dL<H> Phos n/a   Alb 3.0 g/dL<L> PAB n/a  HgA1c 6.1%